# Patient Record
Sex: FEMALE | Race: WHITE | HISPANIC OR LATINO | Employment: PART TIME | URBAN - METROPOLITAN AREA
[De-identification: names, ages, dates, MRNs, and addresses within clinical notes are randomized per-mention and may not be internally consistent; named-entity substitution may affect disease eponyms.]

---

## 2021-09-05 ENCOUNTER — LAB (OUTPATIENT)
Dept: FAMILY MEDICINE | Facility: CLINIC | Age: 31
End: 2021-09-05
Attending: PHYSICIAN ASSISTANT
Payer: COMMERCIAL

## 2021-09-05 DIAGNOSIS — R07.0 THROAT PAIN: ICD-10-CM

## 2021-09-05 DIAGNOSIS — G44.209 TENSION HEADACHE: ICD-10-CM

## 2021-09-05 LAB
DEPRECATED S PYO AG THROAT QL EIA: NEGATIVE
GROUP A STREP BY PCR: NOT DETECTED

## 2021-09-05 PROCEDURE — U0003 INFECTIOUS AGENT DETECTION BY NUCLEIC ACID (DNA OR RNA); SEVERE ACUTE RESPIRATORY SYNDROME CORONAVIRUS 2 (SARS-COV-2) (CORONAVIRUS DISEASE [COVID-19]), AMPLIFIED PROBE TECHNIQUE, MAKING USE OF HIGH THROUGHPUT TECHNOLOGIES AS DESCRIBED BY CMS-2020-01-R: HCPCS

## 2021-09-05 PROCEDURE — U0005 INFEC AGEN DETEC AMPLI PROBE: HCPCS

## 2021-09-05 PROCEDURE — 87651 STREP A DNA AMP PROBE: CPT

## 2021-09-06 LAB — SARS-COV-2 RNA RESP QL NAA+PROBE: NEGATIVE

## 2021-09-25 ENCOUNTER — HEALTH MAINTENANCE LETTER (OUTPATIENT)
Age: 31
End: 2021-09-25

## 2021-12-20 ENCOUNTER — LAB REQUISITION (OUTPATIENT)
Dept: LAB | Facility: CLINIC | Age: 31
End: 2021-12-20

## 2021-12-20 LAB — HBV SURFACE AB SERPL IA-ACNC: 0.55 M[IU]/ML

## 2021-12-20 PROCEDURE — 86481 TB AG RESPONSE T-CELL SUSP: CPT | Performed by: INTERNAL MEDICINE

## 2021-12-20 PROCEDURE — 86706 HEP B SURFACE ANTIBODY: CPT | Performed by: INTERNAL MEDICINE

## 2021-12-20 PROCEDURE — 86787 VARICELLA-ZOSTER ANTIBODY: CPT | Performed by: INTERNAL MEDICINE

## 2021-12-21 LAB
GAMMA INTERFERON BACKGROUND BLD IA-ACNC: 0.06 IU/ML
M TB IFN-G BLD-IMP: NEGATIVE
M TB IFN-G CD4+ BCKGRND COR BLD-ACNC: 9.94 IU/ML
MITOGEN IGNF BCKGRD COR BLD-ACNC: 0.05 IU/ML
MITOGEN IGNF BCKGRD COR BLD-ACNC: 0.07 IU/ML
QUANTIFERON MITOGEN: 10 IU/ML
QUANTIFERON NIL TUBE: 0.06 IU/ML
QUANTIFERON TB1 TUBE: 0.13 IU/ML
QUANTIFERON TB2 TUBE: 0.11
VZV IGG SER QL IA: >4000 INDEX
VZV IGG SER QL IA: POSITIVE

## 2022-01-11 ENCOUNTER — VIRTUAL VISIT (OUTPATIENT)
Dept: FAMILY MEDICINE | Facility: CLINIC | Age: 32
End: 2022-01-11
Payer: COMMERCIAL

## 2022-01-11 DIAGNOSIS — Z31.41 FERTILITY TESTING: Primary | ICD-10-CM

## 2022-01-11 PROCEDURE — 99213 OFFICE O/P EST LOW 20 MIN: CPT | Mod: 95 | Performed by: NURSE PRACTITIONER

## 2022-01-11 NOTE — PROGRESS NOTES
Franchesca is a 31 year old who is being evaluated via a billable video visit.      How would you like to obtain your AVS? MyChart  If the video visit is dropped, the invitation should be resent by: Text to cell phone: [phone]  Will anyone else be joining your video visit? No    Video Start Time: 9:21 AM    Assessment & Plan     (Z31.41) Fertility testing  (primary encounter diagnosis)  Comment: Patient is attempted to try to get pregnant over the last year does use an ovulation calculator has not been successful concerned would like to have follow-up with OB/GYN for fertility work-up and testing  Plan: Ob/Gyn Referral       No follow-ups on file.    NEFTALI Suarez CNP  [facility]    Mario Sorensen is a 31 year old who presents for the following health issues     HPI     Patient and her  have been trying to get pregnant for a year. She would like to discuss fertility testing and treatments.        Review of Systems   Constitutional, HEENT, cardiovascular, pulmonary, gi and gu systems are negative, except as otherwise noted.      Objective           Vitals:  No vitals were obtained today due to virtual visit.    Physical Exam   GENERAL: Healthy, alert and no distress  EYES: Eyes grossly normal to inspection.  No discharge or erythema, or obvious scleral/conjunctival abnormalities.  RESP: No audible wheeze, cough, or visible cyanosis.  No visible retractions or increased work of breathing.    SKIN: Visible skin clear. No significant rash, abnormal pigmentation or lesions.  NEURO: Cranial nerves grossly intact.  Mentation and speech appropriate for age.  PSYCH: Mentation appears normal, affect normal/bright, judgement and insight intact, normal speech and appearance well-groomed.    No results found for any visits on 01/11/22.            Video-Visit Details    Type of service:  Video Visit    Video End Time:9:31     Originating Location (pt. Location): Home    Distant  Location (provider location):  [facility]     Platform used for Video Visit: Brien

## 2022-01-20 ENCOUNTER — OFFICE VISIT (OUTPATIENT)
Dept: OBGYN | Facility: CLINIC | Age: 32
End: 2022-01-20
Payer: COMMERCIAL

## 2022-01-20 VITALS
DIASTOLIC BLOOD PRESSURE: 79 MMHG | SYSTOLIC BLOOD PRESSURE: 121 MMHG | RESPIRATION RATE: 12 BRPM | HEART RATE: 84 BPM | BODY MASS INDEX: 35.95 KG/M2 | HEIGHT: 59 IN | TEMPERATURE: 98.6 F | WEIGHT: 178.3 LBS

## 2022-01-20 DIAGNOSIS — N97.9 FEMALE INFERTILITY, PRIMARY: Primary | ICD-10-CM

## 2022-01-20 DIAGNOSIS — Z12.4 CERVICAL CANCER SCREENING: ICD-10-CM

## 2022-01-20 PROCEDURE — G0145 SCR C/V CYTO,THINLAYER,RESCR: HCPCS | Performed by: OBSTETRICS & GYNECOLOGY

## 2022-01-20 PROCEDURE — 87624 HPV HI-RISK TYP POOLED RSLT: CPT | Performed by: OBSTETRICS & GYNECOLOGY

## 2022-01-20 PROCEDURE — 99243 OFF/OP CNSLTJ NEW/EST LOW 30: CPT | Performed by: OBSTETRICS & GYNECOLOGY

## 2022-01-20 PROCEDURE — 87591 N.GONORRHOEAE DNA AMP PROB: CPT | Performed by: OBSTETRICS & GYNECOLOGY

## 2022-01-20 PROCEDURE — 87491 CHLMYD TRACH DNA AMP PROBE: CPT | Performed by: OBSTETRICS & GYNECOLOGY

## 2022-01-20 ASSESSMENT — MIFFLIN-ST. JEOR: SCORE: 1434.39

## 2022-01-20 NOTE — PROGRESS NOTES
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 Patient and  have been trying to conceive since 2020. Patient reports regular, monthly cycles every 28 days. She has been taking home ovulation predictor tests which have shown positive results. Has been timing intercourse for every other day for 4 occurences around the time of ovulation. Patient has never been pregnant before and has never used any form of hormonal contraception.     OB Hx:  G0      Female infertility factors:  Ovulation   Menses: Patient's last menstrual period was 2021 (exact date)., every 28 days, lasting 5-7 days, menarche age 12-13, vaginal bleeding reported as normal, increasing dysmenorrhea over the past few years.   Ovulation predictor kits: yes, getting positive results   Denies acne, +facial hair growth, no visual field defects    Has never used any contraception    Pt denies excessive exercise, restrictive eating or a history of disordered eating.     Tubal, uterine, cervical factors   H/o STI: denies   H/o PID or ruptured appendix: denies   H/o polyp, fibroid: denies    H/o ectopic pregnancy: no   H/o abnormal pap smear: no, never been screened before   H/o endometriosis: no    General health   Pertinent PMH: Body mass index is 36.01 kg/m .   Pertinent medications: none   Pertinent surgeries: T&A, ear tubes   Smoking, history, quit 5 years ago, 3.5 pack year history   +caffeine, soda 2-3 cans/day   No alcohol   No drug use   Occupation:  at [employer]    Male infertility factors:   Name, age, birthday: Francesco Bowen LA[birth year]   PMH: none, denies history of cancer or mumps    Smoking, alcohol, drug use: none   Prior children: no   Semen analysis: never    Couple factors:   Coital activity:  "reports intercourse every other day around the time of ovulation, denies any concerns for male ejaculation     Infertility treatment to date: none    History reviewed. No pertinent past medical history.   Past Surgical History:   Procedure Laterality Date     MYRINGOTOMY, INSERT TUBE BILATERAL, COMBINED       WI REMOVE TONSILS/ADENOIDS,<13 Y/O      age 9      No current outpatient medications on file.     No current facility-administered medications for this visit.     No Known Allergies  History reviewed. No pertinent family history.  She denies any family history of birth defects, mental retardation, early menopause or infertility.  Social History     Tobacco Use     Smoking status: Former Smoker     Packs/day: 0.50     Years: 7.00     Pack years: 3.50     Smokeless tobacco: Never Used   Vaping Use     Vaping Use: Never used   Substance Use Topics     Alcohol use: Not Currently     Drug use: Never       ROS: A 10 pt ROS was completed and found to be negative unless mentioned in the HPI.     Objective:   VS: /79 (BP Location: Left arm, Patient Position: Sitting, Cuff Size: Adult Large)   Pulse 84   Temp 98.6  F (37  C) (Tympanic)   Resp 12   Ht 1.499 m (4' 11\")   Wt 80.9 kg (178 lb 4.8 oz)   LMP 12/26/2021 (Exact Date)   BMI 36.01 kg/m    General appearance: well-hydrated, A&O x 3, no apparent distress  Lungs: Equal expansion bilaterally, no accessory muscle use  Heart: No heaves or thrills. No peripheral varicosities  Constitutional: See vitals  Abdomen: Soft, non-tender, non-distended. No rebound, rigidity, or guarding.  Extremities: no edema  Neuro: CN II-XII grossly intact  Genitourinary:  External genitalia: no erythema, no lesions.   Urethral meatus appropriate location without lesions or prolapse  Urethra: No masses, tenderness, or scarring  Bladder no fullness, masses, or tenderness.  Anus and Perineum: Unremarkable, no visible lesions  Vagina: Normal, healthy pink mucosa without any lesions. " Physiologic vaginal discharge.   Cervix: normal appearance, no cervical motion tenderness.   Uterus: normal size, shape and consistency.   Adnexa: no masses or tenderness bilaterally.      Labs and Imaging: None    Assessment/Plan:   My impression is that this is a 31 year old G0 with primary infertility.   Counseled the patient on the basic steps to conception and reproductive anatomy, normal rates of conception during one year (~85%) and suspected pathology for her infertility, unknown at this time. Discussed menstrual tracking, use of ovulation predictor kits and timed intercourse (every other day starting the day of +OPK). Recommended a menstrual calendar or phone hollie.     Plan for work-up with day #3 labs including estradiol, LH, FSH, TSH, prolactin, and AMH. Will evaluate for PCOS, plan to obtain free and total testosterone, DHEAS, 17-OHP, HgbA1. Plan to obtain pelvic US today to assess for uterine/ovarian/tubal pathology, then HSG (pt to call clinic on Day#1 of next menses) to assess for tubal patency. Pt is not at risk for PID with HSG, so will not pre-treat with doxycycline.     Will evaluate for possible male factor infertility with semen analysis, ordered today. Desired semen anaylsis parameters include (volume >1.5mL, concentration >15 million/mL, motility >40%, morphology >4% normal forms).     Preconception counseling: counseled patient to continue daily PNV, limit caffeine intake (<250mg), no EtOH or drug use during pregnancy. I reviewed her medications and found no potential teratogens. Discussed patients BMI, Body mass index is 36.01 kg/m ., and recommended dietary and activity modifications. Discussed how obesity can negatively impact ovulation and fertility. Pt is a non-smoker.       Plan to return to clinic in 4- weeks after completion with infertility workup.       Avani Beatty DO

## 2022-01-20 NOTE — NURSING NOTE
"Initial /79 (BP Location: Left arm, Patient Position: Sitting, Cuff Size: Adult Large)   Pulse 84   Temp 98.6  F (37  C) (Tympanic)   Resp 12   Ht 1.499 m (4' 11\")   Wt 80.9 kg (178 lb 4.8 oz)   LMP 12/26/2021 (Exact Date)   BMI 36.01 kg/m   Estimated body mass index is 36.01 kg/m  as calculated from the following:    Height as of this encounter: 1.499 m (4' 11\").    Weight as of this encounter: 80.9 kg (178 lb 4.8 oz). .      "

## 2022-01-21 LAB
C TRACH DNA SPEC QL PROBE+SIG AMP: NEGATIVE
N GONORRHOEA DNA SPEC QL NAA+PROBE: NEGATIVE

## 2022-01-24 LAB
BKR LAB AP GYN ADEQUACY: NORMAL
BKR LAB AP GYN INTERPRETATION: NORMAL
BKR LAB AP HPV REFLEX: NORMAL
BKR LAB AP PREVIOUS ABNORMAL: NORMAL
PATH REPORT.COMMENTS IMP SPEC: NORMAL
PATH REPORT.COMMENTS IMP SPEC: NORMAL
PATH REPORT.RELEVANT HX SPEC: NORMAL

## 2022-01-26 LAB
HUMAN PAPILLOMA VIRUS 16 DNA: NEGATIVE
HUMAN PAPILLOMA VIRUS 18 DNA: NEGATIVE
HUMAN PAPILLOMA VIRUS FINAL DIAGNOSIS: NORMAL
HUMAN PAPILLOMA VIRUS OTHER HR: NEGATIVE

## 2022-01-27 ENCOUNTER — LAB (OUTPATIENT)
Dept: LAB | Facility: CLINIC | Age: 32
End: 2022-01-27
Payer: COMMERCIAL

## 2022-01-27 DIAGNOSIS — N97.9 FEMALE INFERTILITY, PRIMARY: ICD-10-CM

## 2022-01-27 LAB
ESTRADIOL SERPL-MCNC: 56 PG/ML
FSH SERPL-ACNC: 5.6 IU/L
HBA1C MFR BLD: 5.5 % (ref 0–5.6)
LH SERPL-ACNC: 2.3 IU/L
PROLACTIN SERPL-MCNC: 18 UG/L (ref 3–27)
TSH SERPL DL<=0.005 MIU/L-ACNC: 1.66 MU/L (ref 0.4–4)

## 2022-01-27 PROCEDURE — 83001 ASSAY OF GONADOTROPIN (FSH): CPT

## 2022-01-27 PROCEDURE — 83036 HEMOGLOBIN GLYCOSYLATED A1C: CPT

## 2022-01-27 PROCEDURE — 82670 ASSAY OF TOTAL ESTRADIOL: CPT

## 2022-01-27 PROCEDURE — 83002 ASSAY OF GONADOTROPIN (LH): CPT

## 2022-01-27 PROCEDURE — 83520 IMMUNOASSAY QUANT NOS NONAB: CPT | Mod: 90

## 2022-01-27 PROCEDURE — 36415 COLL VENOUS BLD VENIPUNCTURE: CPT

## 2022-01-27 PROCEDURE — 84443 ASSAY THYROID STIM HORMONE: CPT

## 2022-01-27 PROCEDURE — 82627 DEHYDROEPIANDROSTERONE: CPT

## 2022-01-27 PROCEDURE — 84403 ASSAY OF TOTAL TESTOSTERONE: CPT

## 2022-01-27 PROCEDURE — 84146 ASSAY OF PROLACTIN: CPT

## 2022-01-27 PROCEDURE — 84270 ASSAY OF SEX HORMONE GLOBUL: CPT

## 2022-01-27 PROCEDURE — 87389 HIV-1 AG W/HIV-1&-2 AB AG IA: CPT

## 2022-01-28 LAB
DHEA-S SERPL-MCNC: 431 UG/DL (ref 35–430)
HIV 1+2 AB+HIV1 P24 AG SERPL QL IA: NONREACTIVE
SHBG SERPL-SCNC: 25 NMOL/L (ref 30–135)

## 2022-01-29 LAB — MIS SERPL-MCNC: 5.76 NG/ML

## 2022-02-01 LAB
TESTOST FREE SERPL-MCNC: 0.67 NG/DL
TESTOST SERPL-MCNC: 32 NG/DL (ref 8–60)

## 2022-02-02 ENCOUNTER — HOSPITAL ENCOUNTER (OUTPATIENT)
Dept: GENERAL RADIOLOGY | Facility: CLINIC | Age: 32
End: 2022-02-02
Attending: OBSTETRICS & GYNECOLOGY
Payer: COMMERCIAL

## 2022-02-02 ENCOUNTER — OFFICE VISIT (OUTPATIENT)
Dept: OBGYN | Facility: CLINIC | Age: 32
End: 2022-02-02
Payer: COMMERCIAL

## 2022-02-02 ENCOUNTER — HOSPITAL ENCOUNTER (OUTPATIENT)
Dept: ULTRASOUND IMAGING | Facility: CLINIC | Age: 32
End: 2022-02-02
Attending: OBSTETRICS & GYNECOLOGY
Payer: COMMERCIAL

## 2022-02-02 DIAGNOSIS — N97.9 PRIMARY FEMALE INFERTILITY: Primary | ICD-10-CM

## 2022-02-02 DIAGNOSIS — N97.9 FEMALE INFERTILITY, PRIMARY: ICD-10-CM

## 2022-02-02 PROCEDURE — 58340 CATHETER FOR HYSTEROGRAPHY: CPT | Performed by: OBSTETRICS & GYNECOLOGY

## 2022-02-02 PROCEDURE — 74740 X-RAY FEMALE GENITAL TRACT: CPT

## 2022-02-02 PROCEDURE — 272N000058 XR HYSTEROSALPINGOGRAM

## 2022-02-02 PROCEDURE — 250N000011 HC RX IP 250 OP 636: Performed by: OBSTETRICS & GYNECOLOGY

## 2022-02-02 PROCEDURE — 76830 TRANSVAGINAL US NON-OB: CPT

## 2022-02-02 RX ORDER — IOPAMIDOL 612 MG/ML
15 INJECTION, SOLUTION INTRATHECAL ONCE
Status: COMPLETED | OUTPATIENT
Start: 2022-02-02 | End: 2022-02-02

## 2022-02-02 RX ADMIN — IOPAMIDOL 10 ML: 612 INJECTION, SOLUTION INTRATHECAL at 11:19

## 2022-02-02 NOTE — PROGRESS NOTES
[facility]     HSG Procedure Note     Franchesca Brewer  1990  [id number]     Procedure: Hysterosalpingogram     Indication: Infertility evaluation      Procedure: I reviewed the risks of the procedure including bleeding, cramping/pain and infection. Written informed consent was signed. The patient was placed in dorsal lithotomy and a speculum was used to visualize the cervix. The cervix was cleaned with betadine swabs x3. Tenaculum was placed on the cervix. The HSG catheter was passed through the cervix and the catheter balloon was inflated within the uterus. Dye was injected through the catheter while real-time x-rays were obtained by the Radiologist. Please see their documentation for interpretation of these images. Once adequate images were obtained, all instruments and catheter were removed. The patient tolerated the procedure well and EBL 0cc.    Avani Beatty DO       ear L/ear R detailed exam details…

## 2022-02-04 DIAGNOSIS — N97.9 FEMALE INFERTILITY, PRIMARY: Primary | ICD-10-CM

## 2022-02-07 ENCOUNTER — LAB (OUTPATIENT)
Dept: LAB | Facility: CLINIC | Age: 32
End: 2022-02-07
Payer: COMMERCIAL

## 2022-02-07 DIAGNOSIS — N97.9 FEMALE INFERTILITY, PRIMARY: ICD-10-CM

## 2022-02-07 PROCEDURE — 36415 COLL VENOUS BLD VENIPUNCTURE: CPT

## 2022-02-10 LAB — 17OH-PREG SERPL-MCNC: 811 NG/DL

## 2022-02-11 ENCOUNTER — TELEPHONE (OUTPATIENT)
Dept: OBGYN | Facility: CLINIC | Age: 32
End: 2022-02-11
Payer: COMMERCIAL

## 2022-02-11 DIAGNOSIS — N97.9 FEMALE INFERTILITY, PRIMARY: Primary | ICD-10-CM

## 2022-02-11 NOTE — TELEPHONE ENCOUNTER
Telephone call to patient re: endocrinology referral. Order placed.    Josephine Arango RN on 2/11/2022 at 4:30 PM

## 2022-02-11 NOTE — PROGRESS NOTES
Patient set up with Endocrine referral and Endo will contact her to schedule appointment.    Discussed with patient.    Josephine Arango RN on 2/11/2022 at 4:31 PM

## 2022-02-15 ENCOUNTER — HOSPITAL ENCOUNTER (EMERGENCY)
Facility: CLINIC | Age: 32
Discharge: HOME | End: 2022-02-15
Attending: FAMILY MEDICINE | Admitting: FAMILY MEDICINE
Payer: COMMERCIAL

## 2022-02-15 ENCOUNTER — APPOINTMENT (OUTPATIENT)
Dept: CT IMAGING | Facility: CLINIC | Age: 32
End: 2022-02-15
Attending: FAMILY MEDICINE
Payer: COMMERCIAL

## 2022-02-15 VITALS
SYSTOLIC BLOOD PRESSURE: 114 MMHG | RESPIRATION RATE: 18 BRPM | OXYGEN SATURATION: 98 % | HEART RATE: 62 BPM | TEMPERATURE: 97.2 F | BODY MASS INDEX: 35.95 KG/M2 | DIASTOLIC BLOOD PRESSURE: 76 MMHG | WEIGHT: 178 LBS

## 2022-02-15 DIAGNOSIS — N20.1 URETEROLITHIASIS: ICD-10-CM

## 2022-02-15 LAB
ALBUMIN SERPL-MCNC: 3.8 G/DL (ref 3.4–5)
ALP SERPL-CCNC: 51 U/L (ref 40–150)
ALT SERPL W P-5'-P-CCNC: 36 U/L (ref 0–50)
ANION GAP SERPL CALCULATED.3IONS-SCNC: 8 MMOL/L (ref 3–14)
AST SERPL W P-5'-P-CCNC: 24 U/L (ref 0–45)
BASOPHILS # BLD AUTO: 0.1 10E3/UL (ref 0–0.2)
BASOPHILS NFR BLD AUTO: 1 %
BILIRUB SERPL-MCNC: 0.2 MG/DL (ref 0.2–1.3)
BUN SERPL-MCNC: 14 MG/DL (ref 7–30)
CALCIUM SERPL-MCNC: 8.7 MG/DL (ref 8.5–10.1)
CHLORIDE BLD-SCNC: 108 MMOL/L (ref 94–109)
CO2 SERPL-SCNC: 23 MMOL/L (ref 20–32)
CREAT SERPL-MCNC: 0.8 MG/DL (ref 0.52–1.04)
CRP SERPL-MCNC: 5.3 MG/L (ref 0–8)
EOSINOPHIL # BLD AUTO: 0.2 10E3/UL (ref 0–0.7)
EOSINOPHIL NFR BLD AUTO: 2 %
ERYTHROCYTE [DISTWIDTH] IN BLOOD BY AUTOMATED COUNT: 12.4 % (ref 10–15)
GFR SERPL CREATININE-BSD FRML MDRD: >90 ML/MIN/1.73M2
GLUCOSE BLD-MCNC: 115 MG/DL (ref 70–99)
HCG SERPL QL: NEGATIVE
HCT VFR BLD AUTO: 38.5 % (ref 35–47)
HGB BLD-MCNC: 13.5 G/DL (ref 11.7–15.7)
IMM GRANULOCYTES # BLD: 0.1 10E3/UL
IMM GRANULOCYTES NFR BLD: 1 %
LACTATE SERPL-SCNC: 2.9 MMOL/L (ref 0.7–2)
LIPASE SERPL-CCNC: 227 U/L (ref 73–393)
LYMPHOCYTES # BLD AUTO: 3.3 10E3/UL (ref 0.8–5.3)
LYMPHOCYTES NFR BLD AUTO: 25 %
MCH RBC QN AUTO: 32.1 PG (ref 26.5–33)
MCHC RBC AUTO-ENTMCNC: 35.1 G/DL (ref 31.5–36.5)
MCV RBC AUTO: 91 FL (ref 78–100)
MONOCYTES # BLD AUTO: 0.9 10E3/UL (ref 0–1.3)
MONOCYTES NFR BLD AUTO: 7 %
NEUTROPHILS # BLD AUTO: 8.5 10E3/UL (ref 1.6–8.3)
NEUTROPHILS NFR BLD AUTO: 64 %
NRBC # BLD AUTO: 0 10E3/UL
NRBC BLD AUTO-RTO: 0 /100
PLATELET # BLD AUTO: 271 10E3/UL (ref 150–450)
POTASSIUM BLD-SCNC: 3 MMOL/L (ref 3.4–5.3)
PROT SERPL-MCNC: 7.2 G/DL (ref 6.8–8.8)
RBC # BLD AUTO: 4.21 10E6/UL (ref 3.8–5.2)
SODIUM SERPL-SCNC: 139 MMOL/L (ref 133–144)
WBC # BLD AUTO: 13 10E3/UL (ref 4–11)

## 2022-02-15 PROCEDURE — 99284 EMERGENCY DEPT VISIT MOD MDM: CPT | Performed by: FAMILY MEDICINE

## 2022-02-15 PROCEDURE — 96361 HYDRATE IV INFUSION ADD-ON: CPT | Performed by: FAMILY MEDICINE

## 2022-02-15 PROCEDURE — 84703 CHORIONIC GONADOTROPIN ASSAY: CPT | Performed by: FAMILY MEDICINE

## 2022-02-15 PROCEDURE — 86140 C-REACTIVE PROTEIN: CPT | Performed by: FAMILY MEDICINE

## 2022-02-15 PROCEDURE — 96374 THER/PROPH/DIAG INJ IV PUSH: CPT | Mod: 59 | Performed by: FAMILY MEDICINE

## 2022-02-15 PROCEDURE — 36415 COLL VENOUS BLD VENIPUNCTURE: CPT | Performed by: FAMILY MEDICINE

## 2022-02-15 PROCEDURE — 258N000003 HC RX IP 258 OP 636: Performed by: FAMILY MEDICINE

## 2022-02-15 PROCEDURE — 80053 COMPREHEN METABOLIC PANEL: CPT | Performed by: FAMILY MEDICINE

## 2022-02-15 PROCEDURE — 250N000011 HC RX IP 250 OP 636: Performed by: FAMILY MEDICINE

## 2022-02-15 PROCEDURE — 99284 EMERGENCY DEPT VISIT MOD MDM: CPT | Mod: 25 | Performed by: FAMILY MEDICINE

## 2022-02-15 PROCEDURE — 74176 CT ABD & PELVIS W/O CONTRAST: CPT

## 2022-02-15 PROCEDURE — 85025 COMPLETE CBC W/AUTO DIFF WBC: CPT | Performed by: FAMILY MEDICINE

## 2022-02-15 PROCEDURE — 96375 TX/PRO/DX INJ NEW DRUG ADDON: CPT | Performed by: FAMILY MEDICINE

## 2022-02-15 PROCEDURE — 83690 ASSAY OF LIPASE: CPT | Performed by: FAMILY MEDICINE

## 2022-02-15 PROCEDURE — 83605 ASSAY OF LACTIC ACID: CPT | Performed by: FAMILY MEDICINE

## 2022-02-15 RX ORDER — ONDANSETRON 2 MG/ML
4 INJECTION INTRAMUSCULAR; INTRAVENOUS ONCE
Status: COMPLETED | OUTPATIENT
Start: 2022-02-15 | End: 2022-02-15

## 2022-02-15 RX ORDER — KETOROLAC TROMETHAMINE 15 MG/ML
10 INJECTION, SOLUTION INTRAMUSCULAR; INTRAVENOUS ONCE
Status: COMPLETED | OUTPATIENT
Start: 2022-02-15 | End: 2022-02-15

## 2022-02-15 RX ORDER — ONDANSETRON 4 MG/1
4 TABLET, ORALLY DISINTEGRATING ORAL EVERY 8 HOURS PRN
Qty: 20 TABLET | Refills: 0 | Status: SHIPPED | OUTPATIENT
Start: 2022-02-15 | End: 2022-02-18

## 2022-02-15 RX ORDER — SODIUM CHLORIDE 9 MG/ML
INJECTION, SOLUTION INTRAVENOUS CONTINUOUS
Status: DISCONTINUED | OUTPATIENT
Start: 2022-02-15 | End: 2022-02-15 | Stop reason: HOSPADM

## 2022-02-15 RX ORDER — OXYCODONE HYDROCHLORIDE 5 MG/1
5 TABLET ORAL EVERY 6 HOURS PRN
Qty: 12 TABLET | Refills: 0 | Status: SHIPPED | OUTPATIENT
Start: 2022-02-15 | End: 2022-02-18

## 2022-02-15 RX ORDER — HYDROMORPHONE HYDROCHLORIDE 1 MG/ML
0.5 INJECTION, SOLUTION INTRAMUSCULAR; INTRAVENOUS; SUBCUTANEOUS
Status: DISCONTINUED | OUTPATIENT
Start: 2022-02-15 | End: 2022-02-15 | Stop reason: HOSPADM

## 2022-02-15 RX ORDER — PRENATAL VIT/IRON FUM/FOLIC AC 27MG-0.8MG
1 TABLET ORAL DAILY
COMMUNITY
End: 2024-06-11

## 2022-02-15 RX ADMIN — KETOROLAC TROMETHAMINE 10 MG: 15 INJECTION, SOLUTION INTRAMUSCULAR; INTRAVENOUS at 12:26

## 2022-02-15 RX ADMIN — SODIUM CHLORIDE 1000 ML: 9 INJECTION, SOLUTION INTRAVENOUS at 12:25

## 2022-02-15 RX ADMIN — ONDANSETRON 4 MG: 2 INJECTION INTRAMUSCULAR; INTRAVENOUS at 12:25

## 2022-02-15 NOTE — ED PROVIDER NOTES
History     Chief Complaint   Patient presents with     Flank Pain     HPI  Franchesca Brewer is a 31 year old female, past medical history is significant for urolithiasis, presents to the emergency department with concerns of sudden onset right flank pain awakening her from sleep early this morning, history of nausea but no vomiting.  Worse with movement, no urinary frequency, urgency or dysuria.  No medications tried at home.  Denies fever chills or sweats.  No recent history for trauma or injury.  The patient states that she has had kidney stones in the past and this feels very consistent with that experience.  No prior abdominal surgeries.      Allergies:  No Known Allergies    Problem List:    There are no problems to display for this patient.       Past Medical History:    No past medical history on file.    Past Surgical History:    Past Surgical History:   Procedure Laterality Date     MYRINGOTOMY, INSERT TUBE BILATERAL, COMBINED       ND REMOVE TONSILS/ADENOIDS,<13 Y/O      age 9       Family History:    No family history on file.    Social History:  Marital Status:   [2]  Social History     Tobacco Use     Smoking status: Former Smoker     Packs/day: 0.50     Years: 7.00     Pack years: 3.50     Smokeless tobacco: Never Used   Vaping Use     Vaping Use: Never used   Substance Use Topics     Alcohol use: Not Currently     Drug use: Never        Medications:    No current outpatient medications on file.        Review of Systems   All other systems reviewed and are negative.      Physical Exam   BP: (!) 125/90  Pulse: 72  Temp: 97.2  F (36.2  C)  Resp: 18  Weight: 80.7 kg (178 lb)  SpO2: 100 %      Physical Exam  Vitals and nursing note reviewed.   Constitutional:       General: She is in acute distress.      Appearance: Normal appearance. She is obese. She is not ill-appearing.      Comments: Alert, anxious, clearly uncomfortable due to pain and nausea   HENT:      Head: Normocephalic and  atraumatic.      Right Ear: Tympanic membrane normal.      Left Ear: Tympanic membrane normal.      Mouth/Throat:      Mouth: Mucous membranes are dry.      Pharynx: Oropharynx is clear.   Eyes:      Extraocular Movements: Extraocular movements intact.      Conjunctiva/sclera: Conjunctivae normal.      Pupils: Pupils are equal, round, and reactive to light.   Cardiovascular:      Rate and Rhythm: Normal rate and regular rhythm.      Pulses: Normal pulses.      Heart sounds: Normal heart sounds.   Pulmonary:      Effort: Pulmonary effort is normal.      Breath sounds: Normal breath sounds.   Abdominal:      General: Bowel sounds are normal.      Palpations: Abdomen is soft.   Musculoskeletal:         General: Normal range of motion.      Cervical back: Normal range of motion and neck supple.   Skin:     General: Skin is warm and dry.      Capillary Refill: Capillary refill takes less than 2 seconds.   Neurological:      General: No focal deficit present.      Mental Status: She is alert and oriented to person, place, and time.   Psychiatric:         Mood and Affect: Mood normal.         Behavior: Behavior normal.         ED Course                 Procedures              Critical Care time:  none               Results for orders placed or performed during the hospital encounter of 02/15/22 (from the past 24 hour(s))   CBC with platelets differential    Narrative    The following orders were created for panel order CBC with platelets differential.  Procedure                               Abnormality         Status                     ---------                               -----------         ------                     CBC with platelets and d...[[id number]]  Abnormal            Final result                 Please view results for these tests on the individual orders.   CRP inflammation   Result Value Ref Range    CRP Inflammation 5.3 0.0 - 8.0 mg/L   Comprehensive metabolic panel   Result Value Ref Range    Sodium 139  133 - 144 mmol/L    Potassium 3.0 (L) 3.4 - 5.3 mmol/L    Chloride 108 94 - 109 mmol/L    Carbon Dioxide (CO2) 23 20 - 32 mmol/L    Anion Gap 8 3 - 14 mmol/L    Urea Nitrogen 14 7 - 30 mg/dL    Creatinine 0.80 0.52 - 1.04 mg/dL    Calcium 8.7 8.5 - 10.1 mg/dL    Glucose 115 (H) 70 - 99 mg/dL    Alkaline Phosphatase 51 40 - 150 U/L    AST 24 0 - 45 U/L    ALT 36 0 - 50 U/L    Protein Total 7.2 6.8 - 8.8 g/dL    Albumin 3.8 3.4 - 5.0 g/dL    Bilirubin Total 0.2 0.2 - 1.3 mg/dL    GFR Estimate >90 >60 mL/min/1.73m2   Lipase   Result Value Ref Range    Lipase 227 73 - 393 U/L   Lactic acid whole blood   Result Value Ref Range    Lactic Acid 2.9 (H) 0.7 - 2.0 mmol/L   HCG qualitative pregnancy (blood)   Result Value Ref Range    hCG Serum Qualitative Negative Negative   CBC with platelets and differential   Result Value Ref Range    WBC Count 13.0 (H) 4.0 - 11.0 10e3/uL    RBC Count 4.21 3.80 - 5.20 10e6/uL    Hemoglobin 13.5 11.7 - 15.7 g/dL    Hematocrit 38.5 35.0 - 47.0 %    MCV 91 78 - 100 fL    MCH 32.1 26.5 - 33.0 pg    MCHC 35.1 31.5 - 36.5 g/dL    RDW 12.4 10.0 - 15.0 %    Platelet Count 271 150 - 450 10e3/uL    % Neutrophils 64 %    % Lymphocytes 25 %    % Monocytes 7 %    % Eosinophils 2 %    % Basophils 1 %    % Immature Granulocytes 1 %    NRBCs per 100 WBC 0 <1 /100    Absolute Neutrophils 8.5 (H) 1.6 - 8.3 10e3/uL    Absolute Lymphocytes 3.3 0.8 - 5.3 10e3/uL    Absolute Monocytes 0.9 0.0 - 1.3 10e3/uL    Absolute Eosinophils 0.2 0.0 - 0.7 10e3/uL    Absolute Basophils 0.1 0.0 - 0.2 10e3/uL    Absolute Immature Granulocytes 0.1 <=0.4 10e3/uL    Absolute NRBCs 0.0 10e3/uL   CT Abdomen Pelvis without Contrast (stone protocol)    Narrative    CT ABDOMEN/PELVIS WITHOUT CONTRAST February 15, 2022 1:16 PM    CLINICAL HISTORY: Flank pain, kidney stone suspected.    TECHNIQUE: CT scan of the abdomen and pelvis was performed without IV  contrast. Multiplanar reformats were obtained. Dose reduction  techniques were  used.  CONTRAST: None.    COMPARISON: 1/23/2012.    FINDINGS:   LOWER CHEST: Normal.    HEPATOBILIARY: Normal.    PANCREAS: Normal.    SPLEEN: Normal.    ADRENAL GLANDS: Normal.    KIDNEYS/BLADDER: There are four calcifications in the right renal  collecting system measuring up to 4 mm. There is mild right  hydronephrosis and hydroureter. There is a 3 mm calculus at the right  ureterovesical junction (series 5, image 188). No urinary tract  calculi or hydronephrosis on the left.    BOWEL: Normal appendix. No bowel obstruction or inflammatory change.    LYMPH NODES: Normal.    VASCULATURE: Unremarkable.    PELVIC ORGANS: Normal.    OTHER: No ascites.    MUSCULOSKELETAL: Unremarkable.      Impression    IMPRESSION: Mild right hydronephrosis and hydroureter above a 3 mm  calculus at the right ureterovesical junction. There are four  additional calcifications in the right renal collecting system.    MONALISA CUTLER MD         SYSTEM ID:  [id number]       Medications   sodium chloride 0.9% infusion (has no administration in time range)   HYDROmorphone (PF) (DILAUDID) injection 0.5 mg (has no administration in time range)   0.9% sodium chloride BOLUS (0 mLs Intravenous Stopped 2/15/22 1323)   ketorolac (TORADOL) injection 10 mg (10 mg Intravenous Given 2/15/22 1226)   ondansetron (ZOFRAN) injection 4 mg (4 mg Intravenous Given 2/15/22 1225)     1:57 PM  Patient comfortable.  Reviewed lab diagnostics and imaging.  We will plan for expectant stone management and reviewed treatment plan.  Contact information for urology given should the patient require further follow-up.  She may return to the emergency department also if unsuccessful with expectant management.  Prescriptions for oxycodone for breakthrough pain that does not respond to ibuprofen 400 mg p.o. 3 times daily with food.  Zofran as needed for nausea/vomiting.    Assessments & Plan (with Medical Decision Making)   31-year-old female past medical history reviewed as  above who presents for evaluation of abdominal and flank pain as discussed in the HPI.  Consistent with previous experience per the patient of ureterolithiasis.  Alert and clearly uncomfortable on exam but with stable adult range normal vital signs.  Diagnostics and imaging reviewed above consistent with a 3 mm distal right ureteral stone near the UVJ.  Expectant management discussed with the patient return criteria reviewed.  All questions were answered disposition is to home.    Disclaimer: This note consists of symbols derived from keyboarding, dictation and/or voice recognition software. As a result, there may be errors in the script that have gone undetected. Please consider this when interpreting information found in this chart.      I have reviewed the nursing notes.    I have reviewed the findings, diagnosis, plan and need for follow up with the patient.       New Prescriptions    No medications on file       Final diagnoses:   Ureterolithiasis       2/15/2022   [facility] EMERGENCY DEPT     Dave Fischer MD  02/15/22 [id number]

## 2022-02-15 NOTE — ED NOTES
Onset of back pain this morning, more in right flank, history of small kidney stones but nothing like this, worse with movement, no urinary sx, nothing tried for pain at home

## 2022-02-15 NOTE — DISCHARGE INSTRUCTIONS
Push fluids, rest.  Ibuprofen 400 mg p.o. 3 times daily with food.  Oxycodone for breakthrough pain.  Zofran as needed for nausea/vomiting.  As long as you are comfortable and can tolerate this at home you can wait up to 2 weeks for stone passage.  If things worsen or you not able to tolerate expectant management because of pain nausea or any other reason please return to the emergency department or contact urology at the contact information given.

## 2022-03-04 NOTE — TELEPHONE ENCOUNTER
RECORDS RECEIVED FROM: internal    DATE RECEIVED: 5.11.22    NOTES (FOR ALL VISITS) STATUS DETAILS   OFFICE NOTES from referring provider internal  Stefany Rivers MD   OFFICE NOTES from other specialist     ED NOTES     OPERATIVE REPORT  (thyroid, pituitary, adrenal, parathyroid)     MEDICATION LIST internal     IMAGING      DEXASCAN     MRI (BRAIN)     XR (Chest)     CT (HEAD/NECK/CHEST/ABDOMEN) internal  2.15.22    NUCLEAR      ULTRASOUND  internal  2.2.22    LABS     DIABETES: HBGA1C, CREATININE, FASTING LIPIDS, MICROALBUMIN URINE, POTASSIUM, TSH, T4    THYROID: TSH, T4, CBC, THYRODLONULIN, TOTAL T3, FREE T4, CALCITONIN, CEA internal  internal

## 2022-05-11 ENCOUNTER — PRE VISIT (OUTPATIENT)
Dept: ENDOCRINOLOGY | Facility: CLINIC | Age: 32
End: 2022-05-11

## 2022-05-11 ENCOUNTER — OFFICE VISIT (OUTPATIENT)
Dept: ENDOCRINOLOGY | Facility: CLINIC | Age: 32
End: 2022-05-11
Payer: COMMERCIAL

## 2022-05-11 VITALS
HEART RATE: 86 BPM | BODY MASS INDEX: 36.12 KG/M2 | WEIGHT: 179.2 LBS | SYSTOLIC BLOOD PRESSURE: 150 MMHG | DIASTOLIC BLOOD PRESSURE: 95 MMHG | HEIGHT: 59 IN

## 2022-05-11 DIAGNOSIS — R79.89 HIGH SERUM 17-HYDROXYPROGESTERONE: ICD-10-CM

## 2022-05-11 DIAGNOSIS — N97.9 FEMALE INFERTILITY: Primary | ICD-10-CM

## 2022-05-11 PROCEDURE — 99205 OFFICE O/P NEW HI 60 MIN: CPT | Performed by: STUDENT IN AN ORGANIZED HEALTH CARE EDUCATION/TRAINING PROGRAM

## 2022-05-11 RX ORDER — METHYLPREDNISOLONE SODIUM SUCCINATE 125 MG/2ML
125 INJECTION, POWDER, LYOPHILIZED, FOR SOLUTION INTRAMUSCULAR; INTRAVENOUS
Status: CANCELLED
Start: 2022-05-12

## 2022-05-11 RX ORDER — MEPERIDINE HYDROCHLORIDE 25 MG/ML
25 INJECTION INTRAMUSCULAR; INTRAVENOUS; SUBCUTANEOUS EVERY 30 MIN PRN
Status: CANCELLED | OUTPATIENT
Start: 2022-05-12

## 2022-05-11 RX ORDER — EPINEPHRINE 1 MG/ML
0.3 INJECTION, SOLUTION, CONCENTRATE INTRAVENOUS EVERY 5 MIN PRN
Status: CANCELLED | OUTPATIENT
Start: 2022-05-12

## 2022-05-11 RX ORDER — COSYNTROPIN 0.25 MG/ML
250 INJECTION, POWDER, FOR SOLUTION INTRAMUSCULAR; INTRAVENOUS ONCE
Status: CANCELLED
Start: 2022-05-12 | End: 2022-05-12

## 2022-05-11 RX ORDER — DIPHENHYDRAMINE HYDROCHLORIDE 50 MG/ML
50 INJECTION INTRAMUSCULAR; INTRAVENOUS
Status: CANCELLED
Start: 2022-05-12

## 2022-05-11 RX ORDER — NALOXONE HYDROCHLORIDE 0.4 MG/ML
0.2 INJECTION, SOLUTION INTRAMUSCULAR; INTRAVENOUS; SUBCUTANEOUS
Status: CANCELLED | OUTPATIENT
Start: 2022-05-12

## 2022-05-11 RX ORDER — ALBUTEROL SULFATE 0.83 MG/ML
2.5 SOLUTION RESPIRATORY (INHALATION)
Status: CANCELLED | OUTPATIENT
Start: 2022-05-12

## 2022-05-11 RX ORDER — ALBUTEROL SULFATE 90 UG/1
1-2 AEROSOL, METERED RESPIRATORY (INHALATION)
Status: CANCELLED
Start: 2022-05-12

## 2022-05-11 ASSESSMENT — PAIN SCALES - GENERAL: PAINLEVEL: NO PAIN (0)

## 2022-05-11 NOTE — LETTER
5/11/2022       RE: Franchesca Brewer  [address] [facility] [address]     Dear Colleague,    Thank you for referring your patient, Franchesca Brewer, to the [facility] ENDOCRINOLOG[facility] [place] [facility]. Please see a copy of my visit note below.    Endocrinology Clinic Visit 5/11/2022    NAME:  Franchesca Brewer  PCP:  [place], Barrie White Post  MRN:  [id number]  Reason for Consult:  elevated 17 hydroxyprogesterone and infertility  Requesting Provider:  Stefany Rivers    Chief Complaint     Chief Complaint   Patient presents with     Consult     Female infertility       History of Present Illness     Franchesca Brewer is a 31 year old female who is seen in clinic for elevated 17 hydroxyprogesterone and infertility    Patient and  have been trying to conceive since November 2020. She has no previous pregnancies,  has no previous biologic kids. She established with Dr. Beatty OB/GYN for fertility work up.  She reported regular periods since puberty every 28 days , last 5-7 days. She has been taking home ovulation tests which have shown positive results. Never been on OCP. She denied any acne. She did report mild hirsutism ( upper lip, under chin , and belly bottom), she thought it is genetic because her mother is  in origin. Since marriage 11/2020 she gained 20 lbs which she related to mood changes due to stress and infertility. She did gain 10 lbs in the past few month despite activity and diet changes and noted one small purple skin stretch janeth on her lower belly. She said she fairly bruises easily in the past couple years. No big changes in energy level , no weakness. BP today was up to 150/95 but usually 110-120 systolic. ROS otherwise negative.    She is on prenatal vitamins, no other meds, no other OTC, never been on steroids before.    She had hormonal work up for fertility which was significant  for elevated 17 hydroxyprogesterone at 811 done on 2/17/2020 2 in the afternoon at 1406.  Patient reported test was done 8 days after her menstrual period, it was done right after she w woke up. she usually sleeps all morning and wakes up in the afternoon (work hours are 3 PM till midnight).  Other significant labs are normal TSH, prolactin, testosterone, FSH, LH, estradiol.  DHEA S in the upper limit of normal at 431.    Imaging: Normal pelvic ultrasound, unremarkable hysterosalpingogram.    Of note,  did not do an analysis yet, appointment scheduled in June.    Social: she works as  at [employer], she does not smoke ( she stopped 5 years ago), no alcohol, no illicit drugs ( occasional weed).    Family hx: father with crohns, aunts with breast cancer, aunt with dementia      Problem List     Patient Active Problem List   Diagnosis     Female infertility        Medications     Current Outpatient Medications   Medication     Prenatal Vit-Fe Fumarate-FA (PRENATAL MULTIVITAMIN W/IRON) 27-0.8 MG tablet     No current facility-administered medications for this visit.        Allergies     No Known Allergies    Medical / Surgical History     No past medical history on file.  Past Surgical History:   Procedure Laterality Date     MYRINGOTOMY, INSERT TUBE BILATERAL, COMBINED       [place] REMOVE TONSILS/ADENOIDS,<13 Y/O      age 9       Social History     Social History     Socioeconomic History     Marital status:      Spouse name: Not on file     Number of children: Not on file     Years of education: Not on file     Highest education level: Not on file   Occupational History     Not on file   Tobacco Use     Smoking status: Former Smoker     Packs/day: 0.50     Years: 7.00     Pack years: 3.50     Smokeless tobacco: Never Used   Vaping Use     Vaping Use: Never used   Substance and Sexual Activity     Alcohol use: Not Currently     Drug use: Never     Sexual activity: Yes     Partners: Male  "  Other Topics Concern     Not on file   Social History Narrative     Not on file     Social Determinants of Health     Financial Resource Strain: Not on file   Food Insecurity: Not on file   Transportation Needs: Not on file   Physical Activity: Not on file   Stress: Not on file   Social Connections: Not on file   Intimate Partner Violence: Not on file   Housing Stability: Not on file       Family History     No family history on file.    ROS     12 ROS completed, pertinent positive and negative in HPI    Physical Exam   BP (!) 150/95 (BP Location: Left arm, Patient Position: Sitting, Cuff Size: Adult Regular)   Pulse 86   Ht 1.499 m (4' 11\")   Wt 81.3 kg (179 lb 3.2 oz)   BMI 36.19 kg/m       General: Comfortable, no obvious distress, normal body habitus  Eyes: Sclera anicteric, moist conjunctiva  HENT: Atraumatic, oropharynx clear, moist mucous membranes with no mucosal ulcerations  Neck: Trachea midline, supple.    CV: normal rate.   Resp:  good effort, no evidence of loud wheezing  Abdomen:  obese, central obesity, non distended.   Skin: No rashes, lesions, or subcutaneous nodules on exposed skin. Very small purple stretch janeth on her lower abdomen.  Psych: Alert and oriented x 3. Appropriate affect, good insight  Extremities: No peripheral edema  Musculoskeletal: Appropriate muscle bulk and strength  Neuro: Moves all four extremities. No focal deficits on limited exam. Gait normal.     Labs/Imaging     Pertinent Labs were reviewed and updated in EPIC and discussed briefly.  Radiology Results were  reviewed and updated in EPIC.    Summary of recent findings:   Lab Results   Component Value Date    A1C 5.5 01/27/2022       TSH   Date Value Ref Range Status   01/27/2022 1.66 0.40 - 4.00 mU/L Final       Creatinine   Date Value Ref Range Status   02/15/2022 0.80 0.52 - 1.04 mg/dL Final       No results for input(s): CHOL, HDL, LDL, TRIG, CHOLHDLRATIO in the last 69540 hours.    No results found for: " [id number], [id number], [id number]    I personally reviewed the patient's outside records from [facility] EMR and Care Everywhere. Summary of pertinent findings in HPI.    Impression / Plan     1.  Infertility  2.  Elevated 17 hydroxyprogesterone    She had elevated 17 hydroxyprogesterone of 811 during follicular phase.  She does not have clinical manifestation of CAH (regular periods, no acne, mild hirsutism).  Fertility work-up is otherwise unremarkable.  We discussed today the possible diagnosis of nonclassical congenital adrenal hyperplasia given her elevated 17 hydroxyprogesterone despite the absence of symptoms.  It would be hard to tell if that is contributing to her infertility given regular periods and evidence of ovulation on ovulatory kits.  We  discussed confirming the diagnosis with high-dose ACTH stim test and option for treatment with steroids.  Glucocorticoid therapy is usually used to restore ovulation, regular and thus increase the chance of conception.    Plan:  -ACTH stim test checking for 17 hydroxy progesterone    3.  Low potassium  Potassium was 3 on labs from February 2022 when she was in the emergency department for kidney stone.  Likely due to low oral intake.  Previous potassium checked in 2011 was 4 .plan to recheck at    4.  Elevated blood pressure today  One-time elevation of blood pressure which she relates to running to clinic and anxiety because of driving in the city.  Previous blood pressure in her records within normal limits.  Discussed with patient to keep an eye on it , if it remains elevated work-up and treatment will be needed . no need for intervention at this point.    5.  Obesity class II  No evidence of underlying endocrinopathy. She has central obesity and new onset 10 lbs wt gain with small new purplish stretch marks other wise no symptoms for Cushing. She needs lifestyle intervention, would offer nutrition referral next visit. She is not a candidate for wt loss medication  now that she is actevily trying to conceive.      Test and/or medications prescribed today:  Orders Placed This Encounter   Procedures     Potassium         Follow up: 3 month       Dena Molina MD  Endocrinology, Diabetes and Metabolism  [facility]    Review of the result(s) of each unique test -   65 minutes spent on the date of the encounter doing chart review, history and exam, documentation and further activities per the note    Answers for HPI/ROS submitted by the patient on 5/8/2022  General Symptoms: No  Skin Symptoms: No  HENT Symptoms: No  EYE SYMPTOMS: No  HEART SYMPTOMS: No  LUNG SYMPTOMS: No  INTESTINAL SYMPTOMS: No  URINARY SYMPTOMS: No  GYNECOLOGIC SYMPTOMS: No  BREAST SYMPTOMS: No  SKELETAL SYMPTOMS: No  BLOOD SYMPTOMS: No  NERVOUS SYSTEM SYMPTOMS: No  MENTAL HEALTH SYMPTOMS: No

## 2022-05-11 NOTE — PROGRESS NOTES
Endocrinology Clinic Visit 5/11/2022    NAME:  Franchesca Brewer  PCP:  Clinic, [facility]  MRN:  [id number]  Reason for Consult:  elevated 17 hydroxyprogesterone and infertility  Requesting Provider:  Stefany Rivers    Chief Complaint     Chief Complaint   Patient presents with     Consult     Female infertility       History of Present Illness     Franchesca Brewer is a 31 year old female who is seen in clinic for elevated 17 hydroxyprogesterone and infertility    Patient and  have been trying to conceive since November 2020. She has no previous pregnancies,  has no previous biologic kids. She established with Dr. Beatty OB/GYN for fertility work up.  She reported regular periods since puberty every 28 days , last 5-7 days. She has been taking home ovulation tests which have shown positive results. Never been on OCP. She denied any acne. She did report mild hirsutism ( upper lip, under chin , and belly bottom), she thought it is genetic because her mother is  in origin. Since marriage 11/2020 she gained 20 lbs which she related to mood changes due to stress and infertility. She did gain 10 lbs in the past few month despite activity and diet changes and noted one small purple skin stretch janeth on her lower belly. She said she fairly bruises easily in the past couple years. No big changes in energy level , no weakness. BP today was up to 150/95 but usually 110-120 systolic. ROS otherwise negative.    She is on prenatal vitamins, no other meds, no other OTC, never been on steroids before.    She had hormonal work up for fertility which was significant for elevated 17 hydroxyprogesterone at 811 done on 2/17/2020 2 in the afternoon at 1406.  Patient reported test was done 8 days after her menstrual period, it was done right after she w woke up. she usually sleeps all morning and wakes up in the afternoon (work hours are 3 PM till midnight).  Other significant labs are  normal TSH, prolactin, testosterone, FSH, LH, estradiol.  DHEA S in the upper limit of normal at 431.    Imaging: Normal pelvic ultrasound, unremarkable hysterosalpingogram.    Of note,  did not do an analysis yet, appointment scheduled in June.    Social: she works as  at [employer], she does not smoke ( she stopped 5 years ago), no alcohol, no illicit drugs ( occasional weed).    Family hx: father with crohns, aunts with breast cancer, aunt with dementia      Problem List     Patient Active Problem List   Diagnosis     Female infertility        Medications     Current Outpatient Medications   Medication     Prenatal Vit-Fe Fumarate-FA (PRENATAL MULTIVITAMIN W/IRON) 27-0.8 MG tablet     No current facility-administered medications for this visit.        Allergies     No Known Allergies    Medical / Surgical History     No past medical history on file.  Past Surgical History:   Procedure Laterality Date     MYRINGOTOMY, INSERT TUBE BILATERAL, COMBINED       [place] REMOVE TONSILS/ADENOIDS,<13 Y/O      age 9       Social History     Social History     Socioeconomic History     Marital status:      Spouse name: Not on file     Number of children: Not on file     Years of education: Not on file     Highest education level: Not on file   Occupational History     Not on file   Tobacco Use     Smoking status: Former Smoker     Packs/day: 0.50     Years: 7.00     Pack years: 3.50     Smokeless tobacco: Never Used   Vaping Use     Vaping Use: Never used   Substance and Sexual Activity     Alcohol use: Not Currently     Drug use: Never     Sexual activity: Yes     Partners: Male   Other Topics Concern     Not on file   Social History Narrative     Not on file     Social Determinants of Health     Financial Resource Strain: Not on file   Food Insecurity: Not on file   Transportation Needs: Not on file   Physical Activity: Not on file   Stress: Not on file   Social Connections: Not on file   Intimate  "Partner Violence: Not on file   Housing Stability: Not on file       Family History     No family history on file.    ROS     12 ROS completed, pertinent positive and negative in HPI    Physical Exam   BP (!) 150/95 (BP Location: Left arm, Patient Position: Sitting, Cuff Size: Adult Regular)   Pulse 86   Ht 1.499 m (4' 11\")   Wt 81.3 kg (179 lb 3.2 oz)   BMI 36.19 kg/m       General: Comfortable, no obvious distress, normal body habitus  Eyes: Sclera anicteric, moist conjunctiva  HENT: Atraumatic, oropharynx clear, moist mucous membranes with no mucosal ulcerations  Neck: Trachea midline, supple.    CV: normal rate.   Resp:  good effort, no evidence of loud wheezing  Abdomen:  obese, central obesity, non distended.   Skin: No rashes, lesions, or subcutaneous nodules on exposed skin. Very small purple stretch janeth on her lower abdomen.  Psych: Alert and oriented x 3. Appropriate affect, good insight  Extremities: No peripheral edema  Musculoskeletal: Appropriate muscle bulk and strength  Neuro: Moves all four extremities. No focal deficits on limited exam. Gait normal.     Labs/Imaging     Pertinent Labs were reviewed and updated in EPIC and discussed briefly.  Radiology Results were  reviewed and updated in EPIC.    Summary of recent findings:   Lab Results   Component Value Date    A1C 5.5 01/27/2022       TSH   Date Value Ref Range Status   01/27/2022 1.66 0.40 - 4.00 mU/L Final       Creatinine   Date Value Ref Range Status   02/15/2022 0.80 0.52 - 1.04 mg/dL Final       No results for input(s): CHOL, HDL, LDL, TRIG, CHOLHDLRATIO in the last 10460 hours.    No results found for: [id number], [id number], [id number]    I personally reviewed the patient's outside records from [facility] EMR and Care Everywhere. Summary of pertinent findings in HPI.    Impression / Plan     1.  Infertility  2.  Elevated 17 hydroxyprogesterone    She had elevated 17 hydroxyprogesterone of 811 during follicular phase.  She does not have " clinical manifestation of CAH (regular periods, no acne, mild hirsutism).  Fertility work-up is otherwise unremarkable.  We discussed today the possible diagnosis of nonclassical congenital adrenal hyperplasia given her elevated 17 hydroxyprogesterone despite the absence of symptoms.  It would be hard to tell if that is contributing to her infertility given regular periods and evidence of ovulation on ovulatory kits.  We  discussed confirming the diagnosis with high-dose ACTH stim test and option for treatment with steroids.  Glucocorticoid therapy is usually used to restore ovulation, regular and thus increase the chance of conception.    Plan:  -ACTH stim test checking for 17 hydroxy progesterone    3.  Low potassium  Potassium was 3 on labs from February 2022 when she was in the emergency department for kidney stone.  Likely due to low oral intake.  Previous potassium checked in 2011 was 4 .plan to recheck at    4.  Elevated blood pressure today  One-time elevation of blood pressure which she relates to running to clinic and anxiety because of driving in the city.  Previous blood pressure in her records within normal limits.  Discussed with patient to keep an eye on it , if it remains elevated work-up and treatment will be needed . no need for intervention at this point.    5.  Obesity class II  No evidence of underlying endocrinopathy. She has central obesity and new onset 10 lbs wt gain with small new purplish stretch marks other wise no symptoms for Cushing. She needs lifestyle intervention, would offer nutrition referral next visit. She is not a candidate for wt loss medication now that she is actevily trying to conceive.      Test and/or medications prescribed today:  Orders Placed This Encounter   Procedures     Potassium         Follow up: 3 month       Dena Molina MD  Endocrinology, Diabetes and Metabolism  [facility]    Review of the result(s) of each unique test -   65 minutes spent on  the date of the encounter doing chart review, history and exam, documentation and further activities per the note    Answers for HPI/ROS submitted by the patient on 5/8/2022  General Symptoms: No  Skin Symptoms: No  HENT Symptoms: No  EYE SYMPTOMS: No  HEART SYMPTOMS: No  LUNG SYMPTOMS: No  INTESTINAL SYMPTOMS: No  URINARY SYMPTOMS: No  GYNECOLOGIC SYMPTOMS: No  BREAST SYMPTOMS: No  SKELETAL SYMPTOMS: No  BLOOD SYMPTOMS: No  NERVOUS SYSTEM SYMPTOMS: No  MENTAL HEALTH SYMPTOMS: No

## 2022-05-11 NOTE — NURSING NOTE
"Chief Complaint   Patient presents with     Consult     Female infertility     Vital signs:      BP: (!) 150/95 Pulse: 86           Height: 149.9 cm (4' 11\") Weight: 81.3 kg (179 lb 3.2 oz)  Estimated body mass index is 36.19 kg/m  as calculated from the following:    Height as of this encounter: 1.499 m (4' 11\").    Weight as of this encounter: 81.3 kg (179 lb 3.2 oz).        "

## 2022-05-11 NOTE — PATIENT INSTRUCTIONS
It was nice meeting you    Lets schedule a test to confirm the elevation of 17-hydroxyprogesterone, test is called ACTH stimulation test.     You had low potassium on old lab, ask them to draw a potassium level

## 2022-07-22 DIAGNOSIS — N97.9 FEMALE INFERTILITY: Primary | ICD-10-CM

## 2022-07-22 DIAGNOSIS — Z32.01 PREGNANCY TEST POSITIVE: ICD-10-CM

## 2022-07-26 ENCOUNTER — LAB (OUTPATIENT)
Dept: LAB | Facility: CLINIC | Age: 32
End: 2022-07-26
Payer: COMMERCIAL

## 2022-07-26 DIAGNOSIS — Z32.01 PREGNANCY TEST POSITIVE: ICD-10-CM

## 2022-07-26 LAB — B-HCG SERPL-ACNC: 941 IU/L (ref 0–5)

## 2022-07-26 PROCEDURE — 84702 CHORIONIC GONADOTROPIN TEST: CPT

## 2022-07-26 PROCEDURE — 36415 COLL VENOUS BLD VENIPUNCTURE: CPT

## 2022-07-29 ENCOUNTER — LAB (OUTPATIENT)
Dept: LAB | Facility: CLINIC | Age: 32
End: 2022-07-29
Payer: COMMERCIAL

## 2022-07-29 DIAGNOSIS — Z32.01 PREGNANCY TEST POSITIVE: ICD-10-CM

## 2022-07-29 LAB — B-HCG SERPL-ACNC: 3693 IU/L (ref 0–5)

## 2022-07-29 PROCEDURE — 84702 CHORIONIC GONADOTROPIN TEST: CPT

## 2022-07-29 PROCEDURE — 36415 COLL VENOUS BLD VENIPUNCTURE: CPT

## 2022-08-11 ENCOUNTER — PRENATAL OFFICE VISIT (OUTPATIENT)
Dept: OBGYN | Facility: CLINIC | Age: 32
End: 2022-08-11
Payer: COMMERCIAL

## 2022-08-11 DIAGNOSIS — Z34.00 PRENATAL CARE, FIRST PREGNANCY: ICD-10-CM

## 2022-08-11 PROCEDURE — 99207 PR NO CHARGE NURSE ONLY: CPT | Performed by: OBSTETRICS & GYNECOLOGY

## 2022-08-11 NOTE — PROGRESS NOTES
Lifestyle and nutrition teaching completed   Quyen Piedra OB Intake Nurse    Patient supplied answers from flow sheet for:  Prenatal OB Questionnaire.

## 2022-08-17 ENCOUNTER — PRENATAL OFFICE VISIT (OUTPATIENT)
Dept: OBGYN | Facility: CLINIC | Age: 32
End: 2022-08-17
Payer: COMMERCIAL

## 2022-08-17 VITALS
HEIGHT: 59 IN | WEIGHT: 177.9 LBS | SYSTOLIC BLOOD PRESSURE: 124 MMHG | TEMPERATURE: 98.4 F | DIASTOLIC BLOOD PRESSURE: 86 MMHG | HEART RATE: 73 BPM | RESPIRATION RATE: 16 BRPM | BODY MASS INDEX: 35.86 KG/M2

## 2022-08-17 DIAGNOSIS — Z34.01 ENCOUNTER FOR PRENATAL CARE IN FIRST TRIMESTER OF FIRST PREGNANCY: Primary | ICD-10-CM

## 2022-08-17 LAB
ABO/RH(D): NORMAL
ALBUMIN UR-MCNC: NEGATIVE MG/DL
ANTIBODY SCREEN: NEGATIVE
APPEARANCE UR: ABNORMAL
BACTERIA #/AREA URNS HPF: ABNORMAL /HPF
BILIRUB UR QL STRIP: NEGATIVE
COLOR UR AUTO: YELLOW
ERYTHROCYTE [DISTWIDTH] IN BLOOD BY AUTOMATED COUNT: 11.9 % (ref 10–15)
GLUCOSE UR STRIP-MCNC: NEGATIVE MG/DL
HCT VFR BLD AUTO: 37.2 % (ref 35–47)
HGB BLD-MCNC: 12.9 G/DL (ref 11.7–15.7)
HGB UR QL STRIP: NEGATIVE
KETONES UR STRIP-MCNC: NEGATIVE MG/DL
LEUKOCYTE ESTERASE UR QL STRIP: NEGATIVE
MCH RBC QN AUTO: 32 PG (ref 26.5–33)
MCHC RBC AUTO-ENTMCNC: 34.7 G/DL (ref 31.5–36.5)
MCV RBC AUTO: 92 FL (ref 78–100)
MUCOUS THREADS #/AREA URNS LPF: PRESENT /LPF
NITRATE UR QL: NEGATIVE
PH UR STRIP: 5.5 [PH] (ref 5–7)
PLATELET # BLD AUTO: 264 10E3/UL (ref 150–450)
RBC # BLD AUTO: 4.03 10E6/UL (ref 3.8–5.2)
RBC #/AREA URNS AUTO: ABNORMAL /HPF
SP GR UR STRIP: 1.01 (ref 1–1.03)
SPECIMEN EXPIRATION DATE: NORMAL
SQUAMOUS #/AREA URNS AUTO: ABNORMAL /LPF
T PALLIDUM AB SER QL: NONREACTIVE
UROBILINOGEN UR STRIP-ACNC: 0.2 E.U./DL
WBC # BLD AUTO: 11.4 10E3/UL (ref 4–11)
WBC #/AREA URNS AUTO: ABNORMAL /HPF
WBC CLUMPS #/AREA URNS HPF: PRESENT /HPF

## 2022-08-17 PROCEDURE — 87591 N.GONORRHOEAE DNA AMP PROB: CPT | Performed by: OBSTETRICS & GYNECOLOGY

## 2022-08-17 PROCEDURE — 36415 COLL VENOUS BLD VENIPUNCTURE: CPT | Performed by: OBSTETRICS & GYNECOLOGY

## 2022-08-17 PROCEDURE — 86803 HEPATITIS C AB TEST: CPT | Performed by: OBSTETRICS & GYNECOLOGY

## 2022-08-17 PROCEDURE — 87491 CHLMYD TRACH DNA AMP PROBE: CPT | Performed by: OBSTETRICS & GYNECOLOGY

## 2022-08-17 PROCEDURE — 76817 TRANSVAGINAL US OBSTETRIC: CPT | Performed by: OBSTETRICS & GYNECOLOGY

## 2022-08-17 PROCEDURE — 87086 URINE CULTURE/COLONY COUNT: CPT | Performed by: OBSTETRICS & GYNECOLOGY

## 2022-08-17 PROCEDURE — 86901 BLOOD TYPING SEROLOGIC RH(D): CPT | Performed by: OBSTETRICS & GYNECOLOGY

## 2022-08-17 PROCEDURE — 86900 BLOOD TYPING SEROLOGIC ABO: CPT | Performed by: OBSTETRICS & GYNECOLOGY

## 2022-08-17 PROCEDURE — 86780 TREPONEMA PALLIDUM: CPT | Performed by: OBSTETRICS & GYNECOLOGY

## 2022-08-17 PROCEDURE — 87340 HEPATITIS B SURFACE AG IA: CPT | Performed by: OBSTETRICS & GYNECOLOGY

## 2022-08-17 PROCEDURE — 87389 HIV-1 AG W/HIV-1&-2 AB AG IA: CPT | Performed by: OBSTETRICS & GYNECOLOGY

## 2022-08-17 PROCEDURE — 86762 RUBELLA ANTIBODY: CPT | Performed by: OBSTETRICS & GYNECOLOGY

## 2022-08-17 PROCEDURE — 99207 PR FIRST OB VISIT: CPT | Performed by: OBSTETRICS & GYNECOLOGY

## 2022-08-17 PROCEDURE — 86850 RBC ANTIBODY SCREEN: CPT | Performed by: OBSTETRICS & GYNECOLOGY

## 2022-08-17 PROCEDURE — 87186 SC STD MICRODIL/AGAR DIL: CPT | Performed by: OBSTETRICS & GYNECOLOGY

## 2022-08-17 PROCEDURE — 85027 COMPLETE CBC AUTOMATED: CPT | Performed by: OBSTETRICS & GYNECOLOGY

## 2022-08-17 PROCEDURE — 81001 URINALYSIS AUTO W/SCOPE: CPT | Performed by: OBSTETRICS & GYNECOLOGY

## 2022-08-17 NOTE — PROGRESS NOTES
"[facility]    New OB Visit Note    CC: New OB     Subjective:    Franchesca is a 31 year old  at 8w0d by LMP 22 who presents for her initial OB visit. This is a planned pregnancy and her and her partner very are excited. She reports feeling well. Denies any uterine cramping, abdominal pain or vaginal bleeding. Has mild nausea and no vomiting.       Pt reports that she feels safe at home and her mood is good.   Concerns: none    Past OB History: none       OB History    Para Term  AB Living   1 0 0 0 0 0   SAB IAB Ectopic Multiple Live Births   0 0 0 0 0      # Outcome Date GA Lbr Bharat/2nd Weight Sex Delivery Anes PTL Lv   1 Current                History reviewed. No pertinent past medical history.    Past Surgical History:   Procedure Laterality Date     MYRINGOTOMY, INSERT TUBE BILATERAL, COMBINED       IN REMOVE TONSILS/ADENOIDS,<11 Y/O      age 9       Current Outpatient Medications   Medication Sig Dispense Refill     Prenatal Vit-Fe Fumarate-FA (PRENATAL MULTIVITAMIN W/IRON) 27-0.8 MG tablet Take 1 tablet by mouth daily         Family History   Problem Relation Age of Onset     Crohn's Disease Father        Social History     Tobacco Use     Smoking status: Former Smoker     Packs/day: 0.50     Years: 7.00     Pack years: 3.50     Quit date: 2017     Years since quittin.2     Smokeless tobacco: Never Used   Substance Use Topics     Alcohol use: Not Currently       ROS: A 10 pt ROS was completed and found to be negative unless mentioned in the HPI.     Objective:    /86 (BP Location: Right arm, Patient Position: Chair, Cuff Size: Adult Regular)   Pulse 73   Temp 98.4  F (36.9  C) (Tympanic)   Resp 16   Ht 1.492 m (4' 10.\")   Wt 80.7 kg (177 lb 14.4 oz)   LMP 2022   Breastfeeding No   BMI 36.24 kg/m      Estimated body mass index is 36.24 kg/m  as calculated from the following:    Height as of this encounter: 1.492 m (4' 10.75\").    " Weight as of this encounter: 80.7 kg (177 lb 14.4 oz).    General appearance: well-hydrated, A&O x 3, no apparent distress  Lungs: Equal expansion bilaterally, no accessory muscle use  Heart: No heaves or thrills. No peripheral varicosities  Constitutional: See vitals  Abdomen: Soft, non-tender, non-distended. No rebound, rigidity, or guarding.  Extremities: no edema  Genitourinary:  External genitalia: no erythema, no lesions.   Urethral meatus appropriate location without lesions or prolapse  Urethra: No masses, tenderness, or scarring  Bladder no fullness, masses, or tenderness.  Anus and Perineum: Unremarkable, no visible lesions  Vagina: Normal, healthy pink mucosa without any lesions. Physiologic vaginal discharge.   Cervix: normal appearance, no cervical motion tenderness.   Uterus: gravid  Adnexa: no masses or tenderness bilaterally.    Bedside Ultrasound    Transvaginal ultrasound was performed. A viable intrauterine pregnancy was seen.      CRL= 1.55 cm   FHT= fetal cardiac activity visible, but not measured  EGA= 8 weeks 0 days  EDC based on US = 3/29/23  EDC by LMP= 3/29/23  FINAL EDC= 3/29/23            Assessment and Plan:     Encounter Diagnosis   Name Primary?     Encounter for prenatal care in first trimester of first pregnancy Yes       31 year old  at 8w0d  by LMP confirmed by ultrasound today who presents for her initial OB visit.     1) Plan to draw new OB lab today   2) Discussed routine prenatal care, group practice model at [facility], tertiary support and frequency of visits. Also discussed options for genetic screening tests. Patient desires cell free DNA for genetic testing. She will do this next visit.  3) Dating/viability US performed today.  Consistent with LMP  4) Concerns: none  5) PMH/OBHx problems: none  6) Medication review: no changes, continue prenatal vitamin   7) Reviewed miscarriage precautions (present to ED or call clinic with abdominal pain, vaginal bleeding or  fever)   8) Weight gain: discussed weight gain expectations (BMI <18.5: 28-40lbs, BMI 18.5-25: 25-35lbs, BMI 25-30: 15-25lbs, BMI >30: 11-20lbs)   9) PAP smear: not due  10) Delivery plan: continue to discuss route of delivery, breastfeeding, pp contraception, pain management in labor  11) Immunizations: Tdap at 28wks   12) No increased risk for gestational diabetes, plan for screen at 28wks    Return to clinic in 4 weeks or prn.

## 2022-08-17 NOTE — PROGRESS NOTES
"Franchesca Brewer is a 31 year old {MARITAL STATUS (OB):269554} who presents to the clinic for an new ob visit.    Estimated Date of Delivery: Mar 29, 2023 is calculated from Patient's last menstrual period was 2022.   She {HAS BLEEDIN::\"has not had bleeding since her LMP\"}.   She {NAUSEA:761860::\"has had mild nausea.\"} Weigh loss {WEIGHT LOSS:143687::\"has not occurred.\"}   This {WAS/WAS NOT:9033::\"was\"} a planned pregnancy.   FOB {isnot:609676::\"is\"} involved,  ***   OTHER CONCERNS: ***  INFECTION HISTORY  HIV: no  Hepatitis B: no  Hepatitis C: no  Syphilis:  no  Tuberculosis: no   PPD- {YES NO RISK:196954::\"no\"}  Herpes self: {YES NO RISK:326325::\"no\"}  Herpes partner:  {YES NO RISK:471396::\"no\"}  Chlamydia:  {YES NO RISK:087666::\"no\"}  Gonorrhea:  {YES NO RISK:406747::\"no\"}  HPV: {YES NO RISK:767575::\"no\"}  BV:  {YES NO RISK:837505::\"no\"}  Trichomonis:  {YES NO RISK:802345::\"no\"}  Chicken Pox:  {YES NO RISK:443599::\"YES\"}  ====================================================  PERSONAL/SOCIAL HISTORY  Lives {LIVES WITH OB:821901}.  Employment: {EMPLOYMENT STATUS:351399}.  Her job involves {WORK TYPES:200020} .  Additional items: {Social items:678916}  =====================================================   REVIEW OF SYSTEMS  {ROS NORMAL:630888::\"CONSTITUTIONAL: NEGATIVE for fever, chills\",\"EYES: NEGATIVE for vision changes \",\"RESP: NEGATIVE for significant cough or SOB\",\"CV: NEGATIVE for chest pain, palpitations \",\"GI: NEGATIVE for nausea, abdominal pain, heartburn, or change in bowel habits\",\": NEGATIVE for frequency, dysuria, or hematuria\",\"MUSCULOSKELETAL: NEGATIVE for significant arthralgias or myalgia\",\"NEURO: NEGATIVE for weakness, dizziness or paresthesias or headache\"}  ====================================================  PHYSICAL EXAM:  /86 (BP Location: Right arm, Patient Position: Chair, Cuff Size: Adult Regular)   Pulse 73   Temp 98.4  F (36.9  C) (Tympanic)   Resp 16   Ht " "1.492 m (4' 10.75\")   Wt 80.7 kg (177 lb 14.4 oz)   LMP 06/22/2022   Breastfeeding No   BMI 36.24 kg/m    BMI- Body mass index is 36.24 kg/m .,     RECOMMENDED WEIGHT GAIN: {WGT GAIN:592005}  GENERAL:  Pleasant pregnant female, alert, well groomed.  SKIN:  Warm and dry, without lesions or rashes  HEAD: Symmetrical features.  EYES:  PERRLA,   MOUTH:  Buccal mucosa pink, moist without lesions.    NECK:  Thyroid without enlargement and nodules.  Lymph nodes not palpable. ***  LUNGS:  Clear to auscultation.  BREAST:  Symmetrical.  No dominant, fixed or suspicious masses are noted.  No skin or nipple changes or axillary nodes.  Self exam is taught and encouraged.  Nipples {NIPPLES:897539::\"everted\"}.      HEART:  RRR without murmur.  ABDOMEN: Soft without masses , tenderness or organomegaly.  No CVA tenderness. {ABD OB:190371}. FHT ***  MUSCULOSKELETAL:  Full range of motion  EXTREMITIES:  No edema. No significant varicosities.   GENITALIA:  BUS WNL, no lesions noted ***  VAGINA:  Pink, normal rugae and discharge {GYN VAGINAL DISCHARGE:721}, ***  CERVIX:  smooth, without discharge or CMT and {CERVIC2:349745} os,   {CERVIX 3:587634::\"firm/ closed\"} {NUMBERS1-5:664420::\"2\"} cm long.  UTERUS: {UTERINE POSITION:487960::\"Anteverted\"}, nontender {4-11:151210} weeks in size.  ADNEXA: {ADNEXA OB:365431::\"Without masses or tenderness\"}  PELVIS:  Arch; {ARCH:507434}. Sacrum; {SACRUM:789251}. Spines;{SPINES:517188}.  Side walls; {SIDE WALLS:335895}. Type; {PELVIS TYPE:230439}  PELVIS:   {km bony pelvis:743388::\"Adequate\"}, Pelvis proven to {BIRTH WEIGHT:561731}.  RECTAL:  Normal appearance.  Digital exam deferred.  WET PREP:{VAGINAL WET PREP:139494::\"Not done\"}  gonorrhea  =========================================  ASSESSMENT:  (Z34.01) Encounter for prenatal care in first trimester of first pregnancy  (primary encounter diagnosis)  Comment: ***  Plan: Chlamydia trachomatis/Neisseria gonorrhoeae by         PCR        " "***    PREGNANCY AT RISK? {YES NO RISK:455011::\"no\"}  ==========================================  PLAN:  Instructed on use of triage nurse line and contacting the on call CNM after hours for an urgent need such as fever, vagina bleeding, bladder or vaginal infection, rupture of membranes,  or term labor.    Discussed the indications, uses for and false positives for quad screen, nuchal translucency and fetal survey ultrasound at 18-20 weeks gestation. Will inform us at the next visit if she wished to avail herself of these screens.  Instructed on best evidence for: weight gain for her BMI for pregnancy; healthy diet and foods to avoid; exercise and activity during pregnancy;avoiding exposure to toxoplasmosis; and maintenance of a generally healthy lifestyle.   Discussed the harms, benefits, side effects and alternative therapies for current prescribed and OTC medications.  ***    "

## 2022-08-18 ENCOUNTER — ALLIED HEALTH/NURSE VISIT (OUTPATIENT)
Dept: OBGYN | Facility: CLINIC | Age: 32
End: 2022-08-18
Payer: COMMERCIAL

## 2022-08-18 DIAGNOSIS — Z34.01 ENCOUNTER FOR PRENATAL CARE IN FIRST TRIMESTER OF FIRST PREGNANCY: Primary | ICD-10-CM

## 2022-08-18 LAB
ALBUMIN UR-MCNC: NEGATIVE MG/DL
APPEARANCE UR: ABNORMAL
BACTERIA #/AREA URNS HPF: ABNORMAL /HPF
BILIRUB UR QL STRIP: NEGATIVE
C TRACH DNA SPEC QL PROBE+SIG AMP: NEGATIVE
COLOR UR AUTO: YELLOW
GLUCOSE UR STRIP-MCNC: NEGATIVE MG/DL
HBV SURFACE AG SERPL QL IA: NONREACTIVE
HCV AB SERPL QL IA: NONREACTIVE
HGB UR QL STRIP: NEGATIVE
HIV 1+2 AB+HIV1 P24 AG SERPL QL IA: NONREACTIVE
KETONES UR STRIP-MCNC: NEGATIVE MG/DL
LEUKOCYTE ESTERASE UR QL STRIP: NEGATIVE
MUCOUS THREADS #/AREA URNS LPF: PRESENT /LPF
N GONORRHOEA DNA SPEC QL NAA+PROBE: NEGATIVE
NITRATE UR QL: NEGATIVE
PH UR STRIP: 5.5 [PH] (ref 5–7)
RBC #/AREA URNS AUTO: ABNORMAL /HPF
RUBV IGG SERPL QL IA: 2.61 INDEX
RUBV IGG SERPL QL IA: POSITIVE
SP GR UR STRIP: 1.02 (ref 1–1.03)
SQUAMOUS #/AREA URNS AUTO: ABNORMAL /LPF
UROBILINOGEN UR STRIP-ACNC: 0.2 E.U./DL
WBC #/AREA URNS AUTO: ABNORMAL /HPF
WBC CLUMPS #/AREA URNS HPF: PRESENT /HPF

## 2022-08-18 PROCEDURE — 87186 SC STD MICRODIL/AGAR DIL: CPT

## 2022-08-18 PROCEDURE — 87086 URINE CULTURE/COLONY COUNT: CPT

## 2022-08-18 PROCEDURE — 99207 PR NO CHARGE NURSE ONLY: CPT

## 2022-08-18 PROCEDURE — 81001 URINALYSIS AUTO W/SCOPE: CPT

## 2022-08-20 LAB — BACTERIA UR CULT: ABNORMAL

## 2022-08-23 DIAGNOSIS — O23.41 URINARY TRACT INFECTION IN MOTHER DURING FIRST TRIMESTER OF PREGNANCY: Primary | ICD-10-CM

## 2022-08-23 LAB
BACTERIA UR CULT: ABNORMAL
BACTERIA UR CULT: ABNORMAL

## 2022-08-23 RX ORDER — AMOXICILLIN 875 MG
875 TABLET ORAL 2 TIMES DAILY
Qty: 10 TABLET | Refills: 0 | Status: SHIPPED | OUTPATIENT
Start: 2022-08-23 | End: 2022-10-12

## 2022-08-23 NOTE — RESULT ENCOUNTER NOTE
Pt notified of below.  Pt reports understanding.  Pt does not have further questions or concerns.    Xiao Kendall   Ob/Gyn Clinic  RN

## 2022-08-23 NOTE — RESULT ENCOUNTER NOTE
Please call Franchesca Brewer and let her know that I sent amoxicillin to the pharmacy for a urinary tract infection.

## 2022-09-14 ENCOUNTER — PRENATAL OFFICE VISIT (OUTPATIENT)
Dept: OBGYN | Facility: CLINIC | Age: 32
End: 2022-09-14
Payer: COMMERCIAL

## 2022-09-14 VITALS
BODY MASS INDEX: 35.24 KG/M2 | HEART RATE: 71 BPM | HEIGHT: 59 IN | SYSTOLIC BLOOD PRESSURE: 121 MMHG | TEMPERATURE: 98.5 F | DIASTOLIC BLOOD PRESSURE: 82 MMHG | RESPIRATION RATE: 16 BRPM | WEIGHT: 174.8 LBS

## 2022-09-14 DIAGNOSIS — Z3A.12 12 WEEKS GESTATION OF PREGNANCY: Primary | ICD-10-CM

## 2022-09-14 DIAGNOSIS — Z87.440 PERSONAL HISTORY OF URINARY TRACT INFECTION: ICD-10-CM

## 2022-09-14 LAB
ALBUMIN UR-MCNC: NEGATIVE MG/DL
APPEARANCE UR: ABNORMAL
BACTERIA #/AREA URNS HPF: ABNORMAL /HPF
BILIRUB UR QL STRIP: NEGATIVE
COLOR UR AUTO: YELLOW
GLUCOSE UR STRIP-MCNC: NEGATIVE MG/DL
HGB UR QL STRIP: NEGATIVE
KETONES UR STRIP-MCNC: NEGATIVE MG/DL
LEUKOCYTE ESTERASE UR QL STRIP: NEGATIVE
MUCOUS THREADS #/AREA URNS LPF: PRESENT /LPF
NITRATE UR QL: NEGATIVE
PH UR STRIP: 5.5 [PH] (ref 5–7)
RBC #/AREA URNS AUTO: ABNORMAL /HPF
SP GR UR STRIP: 1.02 (ref 1–1.03)
SQUAMOUS #/AREA URNS AUTO: ABNORMAL /LPF
UROBILINOGEN UR STRIP-ACNC: 0.2 E.U./DL
WBC #/AREA URNS AUTO: ABNORMAL /HPF

## 2022-09-14 PROCEDURE — 99207 PR PRENATAL VISIT: CPT | Performed by: OBSTETRICS & GYNECOLOGY

## 2022-09-14 PROCEDURE — 81001 URINALYSIS AUTO W/SCOPE: CPT | Performed by: OBSTETRICS & GYNECOLOGY

## 2022-09-14 PROCEDURE — 36415 COLL VENOUS BLD VENIPUNCTURE: CPT | Performed by: OBSTETRICS & GYNECOLOGY

## 2022-09-14 NOTE — PROGRESS NOTES
"[facility]    Return OB Note    CC: Return OB     Subjective:  Franchesca is a 31 year old  at 12w0d   Denies vaginal bleeding, loss of fluid, or pelvic cramping. no fetal movement.  Complaints today: none    Objective:  /82 (BP Location: Left arm, Patient Position: Chair, Cuff Size: Adult Regular)   Pulse 71   Temp 98.5  F (36.9  C) (Tympanic)   Resp 16   Ht 1.486 m (4' 10.5\")   Wt 79.3 kg (174 lb 12.8 oz)   LMP 2022   Breastfeeding No   BMI 35.91 kg/m      See flowsheet    Assessment/Plan:     31 year old  at 12w0d   Encounter Diagnoses   Name Primary?     12 weeks gestation of pregnancy Yes     Personal history of urinary tract infection        First OB labs normal except positive urine culture.  Treated with antibiotics.  Repeat urine sent today.  Discussed genetic testing.  NIPT ordered today.      RTC 4 weeks or prn    Luzmaria Winston MD  "

## 2022-09-14 NOTE — NURSING NOTE
"Initial /82 (BP Location: Left arm, Patient Position: Chair, Cuff Size: Adult Regular)   Pulse 71   Temp 98.5  F (36.9  C) (Tympanic)   Resp 16   Ht 1.486 m (4' 10.5\")   Wt 79.3 kg (174 lb 12.8 oz)   LMP 06/22/2022   Breastfeeding No   BMI 35.91 kg/m   Estimated body mass index is 35.91 kg/m  as calculated from the following:    Height as of this encounter: 1.486 m (4' 10.5\").    Weight as of this encounter: 79.3 kg (174 lb 12.8 oz). .    Prerna Rogers CMA    "

## 2022-09-26 LAB — SCANNED LAB RESULT: NORMAL

## 2022-09-26 NOTE — RESULT ENCOUNTER NOTE
Pt notified of below.  Pt reports understanding.  Pt does not have further questions or concerns.  Patient desired to know gender by phone so was told it was a boy!    Xiao Kendall   Ob/Gyn Clinic  RN

## 2022-10-12 ENCOUNTER — PRENATAL OFFICE VISIT (OUTPATIENT)
Dept: OBGYN | Facility: CLINIC | Age: 32
End: 2022-10-12
Payer: COMMERCIAL

## 2022-10-12 VITALS
TEMPERATURE: 97.5 F | RESPIRATION RATE: 14 BRPM | SYSTOLIC BLOOD PRESSURE: 117 MMHG | WEIGHT: 177.6 LBS | HEART RATE: 68 BPM | HEIGHT: 59 IN | BODY MASS INDEX: 35.8 KG/M2 | DIASTOLIC BLOOD PRESSURE: 71 MMHG

## 2022-10-12 DIAGNOSIS — Z23 NEED FOR PROPHYLACTIC VACCINATION AND INOCULATION AGAINST INFLUENZA: ICD-10-CM

## 2022-10-12 DIAGNOSIS — Z34.02 ENCOUNTER FOR PRENATAL CARE IN SECOND TRIMESTER OF FIRST PREGNANCY: Primary | ICD-10-CM

## 2022-10-12 PROCEDURE — 36415 COLL VENOUS BLD VENIPUNCTURE: CPT | Performed by: OBSTETRICS & GYNECOLOGY

## 2022-10-12 PROCEDURE — 99000 SPECIMEN HANDLING OFFICE-LAB: CPT | Performed by: OBSTETRICS & GYNECOLOGY

## 2022-10-12 PROCEDURE — 90471 IMMUNIZATION ADMIN: CPT | Performed by: OBSTETRICS & GYNECOLOGY

## 2022-10-12 PROCEDURE — 90686 IIV4 VACC NO PRSV 0.5 ML IM: CPT | Performed by: OBSTETRICS & GYNECOLOGY

## 2022-10-12 PROCEDURE — 99207 PR PRENATAL VISIT: CPT | Performed by: OBSTETRICS & GYNECOLOGY

## 2022-10-12 PROCEDURE — 81511 FTL CGEN ABNOR FOUR ANAL: CPT | Mod: 90 | Performed by: OBSTETRICS & GYNECOLOGY

## 2022-10-12 NOTE — PROGRESS NOTES
"[facility]     Return OB Note     CC: Return OB      Subjective:  Franchesca is a 31 year old  at 16w0d   Denies vaginal bleeding, loss of fluid, or pelvic cramping. No fetal movement.  Complaints today: none     Objective:  /71 (BP Location: Left arm, Patient Position: Chair, Cuff Size: Adult Regular)   Pulse 68   Temp 97.5  F (36.4  C) (Tympanic)   Resp 14   Ht 1.486 m (4' 10.5\")   Wt 80.6 kg (177 lb 9.6 oz)   LMP 2022   BMI 36.49 kg/m       See flowsheet     Assessment/Plan:      31 year old  at 16w0d        Encounter Diagnoses   Name Primary?     12 weeks gestation of pregnancy Yes     Personal history of urinary tract infection           First OB labs normal except positive urine culture. Treated with antibiotics. Repeat urine sent today.  Discussed genetic testing.  NIPT nl BOY.   Offered AFP, would like today.     RTC 4 weeks or prn    Stefany Rivers MD  OB/GYN        "

## 2022-10-14 LAB
# FETUSES US: NORMAL
AFP MOM SERPL: 1.38
AFP SERPL-MCNC: 42 NG/ML
AGE - REPORTED: 31.4 YR
CURRENT SMOKER: NO
FAMILY MEMBER DISEASES HX: NO
GA METHOD: NORMAL
GA: NORMAL WK
HCG MOM SERPL: 1.09
HCG SERPL-ACNC: NORMAL IU/L
HX OF HEREDITARY DISORDERS: NO
IDDM PATIENT QL: NO
INHIBIN A MOM SERPL: 0.83
INHIBIN A SERPL-MCNC: 124 PG/ML
INTEGRATED SCN PATIENT-IMP: NORMAL
PATHOLOGY STUDY: NORMAL
SPECIMEN DRAWN SERPL: NORMAL
U ESTRIOL MOM SERPL: 1.66
U ESTRIOL SERPL-MCNC: 1.57 NG/ML

## 2022-11-15 ENCOUNTER — PRENATAL OFFICE VISIT (OUTPATIENT)
Dept: OBGYN | Facility: CLINIC | Age: 32
End: 2022-11-15
Payer: COMMERCIAL

## 2022-11-15 ENCOUNTER — HOSPITAL ENCOUNTER (OUTPATIENT)
Dept: ULTRASOUND IMAGING | Facility: CLINIC | Age: 32
Discharge: HOME | End: 2022-11-15
Attending: OBSTETRICS & GYNECOLOGY | Admitting: OBSTETRICS & GYNECOLOGY
Payer: COMMERCIAL

## 2022-11-15 VITALS
SYSTOLIC BLOOD PRESSURE: 108 MMHG | RESPIRATION RATE: 16 BRPM | WEIGHT: 182.1 LBS | HEART RATE: 68 BPM | DIASTOLIC BLOOD PRESSURE: 65 MMHG | TEMPERATURE: 98.1 F | BODY MASS INDEX: 36.71 KG/M2 | HEIGHT: 59 IN

## 2022-11-15 DIAGNOSIS — Z3A.20 20 WEEKS GESTATION OF PREGNANCY: Primary | ICD-10-CM

## 2022-11-15 DIAGNOSIS — Z34.02 ENCOUNTER FOR PRENATAL CARE IN SECOND TRIMESTER OF FIRST PREGNANCY: ICD-10-CM

## 2022-11-15 PROCEDURE — 99207 PR PRENATAL VISIT: CPT | Performed by: OBSTETRICS & GYNECOLOGY

## 2022-11-15 PROCEDURE — 76805 OB US >/= 14 WKS SNGL FETUS: CPT

## 2022-11-15 NOTE — NURSING NOTE
"Initial /65 (BP Location: Left arm, Patient Position: Chair, Cuff Size: Adult Regular)   Pulse 68   Temp 98.1  F (36.7  C) (Tympanic)   Resp 16   Ht 1.486 m (4' 10.5\")   Wt 82.6 kg (182 lb 1.6 oz)   LMP 06/22/2022   BMI 37.41 kg/m   Estimated body mass index is 37.41 kg/m  as calculated from the following:    Height as of this encounter: 1.486 m (4' 10.5\").    Weight as of this encounter: 82.6 kg (182 lb 1.6 oz). .    Prerna Rogers, VIKI    "

## 2022-11-15 NOTE — PROGRESS NOTES
"[facility]    Return OB Note    CC: Return OB     Subjective:  Franchesca is a 32 year old  at 20w6d   Denies vaginal bleeding, loss of fluid, or pelvic cramping. pos fetal movement.  Complaints today: acid reflux.  Taking Tums 4 times a day.    Objective:  /65 (BP Location: Left arm, Patient Position: Chair, Cuff Size: Adult Regular)   Pulse 68   Temp 98.1  F (36.7  C) (Tympanic)   Resp 16   Ht 1.486 m (4' 10.5\")   Wt 82.6 kg (182 lb 1.6 oz)   LMP 2022   BMI 37.41 kg/m      See flowsheet    Assessment/Plan:     32 year old  at 20w6d   Encounter Diagnosis   Name Primary?     20 weeks gestation of pregnancy Yes        First OB labs normal except positive urine culture. Treated. Repeat urine improved.   NIPT nl BOY.   Normal quad screen  Anatomy ultrasound done today, results pending.    RTC 4 weeks    Luzmaria Winston MD  "

## 2022-12-12 ENCOUNTER — PRENATAL OFFICE VISIT (OUTPATIENT)
Dept: OBGYN | Facility: CLINIC | Age: 32
End: 2022-12-12
Payer: COMMERCIAL

## 2022-12-12 VITALS
BODY MASS INDEX: 36.89 KG/M2 | DIASTOLIC BLOOD PRESSURE: 82 MMHG | HEIGHT: 59 IN | SYSTOLIC BLOOD PRESSURE: 114 MMHG | TEMPERATURE: 98.2 F | WEIGHT: 183 LBS | RESPIRATION RATE: 16 BRPM | HEART RATE: 74 BPM

## 2022-12-12 DIAGNOSIS — R73.09 ABNORMAL GLUCOSE TOLERANCE TEST: Primary | ICD-10-CM

## 2022-12-12 DIAGNOSIS — Z34.02 ENCOUNTER FOR PRENATAL CARE IN SECOND TRIMESTER OF FIRST PREGNANCY: Primary | ICD-10-CM

## 2022-12-12 LAB
ERYTHROCYTE [DISTWIDTH] IN BLOOD BY AUTOMATED COUNT: 12.3 % (ref 10–15)
GLUCOSE 1H P 50 G GLC PO SERPL-MCNC: 138 MG/DL (ref 70–129)
HCT VFR BLD AUTO: 33.2 % (ref 35–47)
HGB BLD-MCNC: 11 G/DL (ref 11.7–15.7)
MCH RBC QN AUTO: 31.9 PG (ref 26.5–33)
MCHC RBC AUTO-ENTMCNC: 33.1 G/DL (ref 31.5–36.5)
MCV RBC AUTO: 96 FL (ref 78–100)
PLATELET # BLD AUTO: 245 10E3/UL (ref 150–450)
RBC # BLD AUTO: 3.45 10E6/UL (ref 3.8–5.2)
WBC # BLD AUTO: 11.3 10E3/UL (ref 4–11)

## 2022-12-12 PROCEDURE — 36415 COLL VENOUS BLD VENIPUNCTURE: CPT | Performed by: OBSTETRICS & GYNECOLOGY

## 2022-12-12 PROCEDURE — 82950 GLUCOSE TEST: CPT | Performed by: OBSTETRICS & GYNECOLOGY

## 2022-12-12 PROCEDURE — 86780 TREPONEMA PALLIDUM: CPT | Performed by: OBSTETRICS & GYNECOLOGY

## 2022-12-12 PROCEDURE — 99207 PR PRENATAL VISIT: CPT | Performed by: OBSTETRICS & GYNECOLOGY

## 2022-12-12 PROCEDURE — 85027 COMPLETE CBC AUTOMATED: CPT | Performed by: OBSTETRICS & GYNECOLOGY

## 2022-12-12 NOTE — PROGRESS NOTES
"[facility]    Return OB Note    CC: Return OB     Subjective:  Franchesca is a 32 year old  at 24w5d   Denies vaginal bleeding, loss of fluid, or regular contractions. Good fetal movement.  Complaints today: None    Objective:  /82 (BP Location: Right arm, Patient Position: Chair, Cuff Size: Adult Regular)   Pulse 74   Temp 98.2  F (36.8  C) (Tympanic)   Resp 16   Ht 1.486 m (4' 10.5\")   Wt 83 kg (183 lb)   LMP 2022   BMI 37.60 kg/m      Fundal height: 26cm  FHT: 150bpm    Assessment/Plan:   Encounter Diagnosis   Name Primary?     Encounter for prenatal care in second trimester of first pregnancy Yes       IUP at 24w5d  -First OB labs normal except positive urine culture. Treated. Repeat urine improved.  -Genetic screening: NIPT low risk, normal quad screen  -Anatomy ultrasound WNL  -Mid trimester labs today  -Obesity: BMI 37: plan for growth US at 32 weeks, BPPs at 37 weeks  -Strict return precautions given    RTC 4 weeks    Avani Beatty DO"

## 2022-12-12 NOTE — NURSING NOTE
"Initial /82 (BP Location: Right arm, Patient Position: Chair, Cuff Size: Adult Regular)   Pulse 74   Temp 98.2  F (36.8  C) (Tympanic)   Resp 16   Ht 1.486 m (4' 10.5\")   Wt 83 kg (183 lb)   LMP 06/22/2022   BMI 37.60 kg/m   Estimated body mass index is 37.6 kg/m  as calculated from the following:    Height as of this encounter: 1.486 m (4' 10.5\").    Weight as of this encounter: 83 kg (183 lb). .      "

## 2022-12-13 LAB — T PALLIDUM AB SER QL: NONREACTIVE

## 2022-12-15 ENCOUNTER — NURSE TRIAGE (OUTPATIENT)
Dept: NURSING | Facility: CLINIC | Age: 32
End: 2022-12-15

## 2022-12-15 ENCOUNTER — TELEPHONE (OUTPATIENT)
Dept: OBGYN | Facility: CLINIC | Age: 32
End: 2022-12-15

## 2022-12-15 ENCOUNTER — OFFICE VISIT (OUTPATIENT)
Dept: URGENT CARE | Facility: URGENT CARE | Age: 32
End: 2022-12-15
Payer: COMMERCIAL

## 2022-12-15 ENCOUNTER — HOSPITAL ENCOUNTER (EMERGENCY)
Facility: CLINIC | Age: 32
Discharge: HOME | End: 2022-12-15
Attending: FAMILY MEDICINE | Admitting: FAMILY MEDICINE
Payer: COMMERCIAL

## 2022-12-15 VITALS
OXYGEN SATURATION: 96 % | BODY MASS INDEX: 37.29 KG/M2 | DIASTOLIC BLOOD PRESSURE: 67 MMHG | HEIGHT: 59 IN | WEIGHT: 185 LBS | HEART RATE: 74 BPM | RESPIRATION RATE: 18 BRPM | TEMPERATURE: 98.8 F | SYSTOLIC BLOOD PRESSURE: 110 MMHG

## 2022-12-15 VITALS
SYSTOLIC BLOOD PRESSURE: 116 MMHG | HEART RATE: 76 BPM | WEIGHT: 185 LBS | DIASTOLIC BLOOD PRESSURE: 79 MMHG | TEMPERATURE: 98.5 F | BODY MASS INDEX: 38.01 KG/M2 | OXYGEN SATURATION: 99 %

## 2022-12-15 DIAGNOSIS — N23 RENAL COLIC: ICD-10-CM

## 2022-12-15 DIAGNOSIS — N39.0 URINARY TRACT INFECTION: ICD-10-CM

## 2022-12-15 DIAGNOSIS — N20.0 NEPHROLITHIASIS: Primary | ICD-10-CM

## 2022-12-15 LAB
ALBUMIN SERPL BCG-MCNC: 3.6 G/DL (ref 3.5–5.2)
ALBUMIN UR-MCNC: ABNORMAL MG/DL
ALBUMIN UR-MCNC: ABNORMAL MG/DL
ALP SERPL-CCNC: 61 U/L (ref 35–104)
ALT SERPL W P-5'-P-CCNC: 9 U/L (ref 10–35)
ANION GAP SERPL CALCULATED.3IONS-SCNC: 16 MMOL/L (ref 7–15)
APPEARANCE UR: ABNORMAL
APPEARANCE UR: CLEAR
AST SERPL W P-5'-P-CCNC: 38 U/L (ref 10–35)
BACTERIA #/AREA URNS HPF: ABNORMAL /HPF
BACTERIA #/AREA URNS HPF: ABNORMAL /HPF
BASOPHILS # BLD AUTO: 0 10E3/UL (ref 0–0.2)
BASOPHILS NFR BLD AUTO: 0 %
BILIRUB SERPL-MCNC: 0.2 MG/DL
BILIRUB UR QL STRIP: NEGATIVE
BILIRUB UR QL STRIP: NEGATIVE
BUN SERPL-MCNC: 9.3 MG/DL (ref 6–20)
CALCIUM SERPL-MCNC: 9.2 MG/DL (ref 8.6–10)
CHLORIDE SERPL-SCNC: 101 MMOL/L (ref 98–107)
COLOR UR AUTO: ABNORMAL
COLOR UR AUTO: YELLOW
CREAT SERPL-MCNC: 0.63 MG/DL (ref 0.51–0.95)
DEPRECATED HCO3 PLAS-SCNC: 17 MMOL/L (ref 22–29)
EOSINOPHIL # BLD AUTO: 0 10E3/UL (ref 0–0.7)
EOSINOPHIL NFR BLD AUTO: 0 %
ERYTHROCYTE [DISTWIDTH] IN BLOOD BY AUTOMATED COUNT: 12.2 % (ref 10–15)
GFR SERPL CREATININE-BSD FRML MDRD: >90 ML/MIN/1.73M2
GLUCOSE SERPL-MCNC: 89 MG/DL (ref 70–99)
GLUCOSE UR STRIP-MCNC: NEGATIVE MG/DL
GLUCOSE UR STRIP-MCNC: NEGATIVE MG/DL
HCT VFR BLD AUTO: 30.8 % (ref 35–47)
HGB BLD-MCNC: 11 G/DL (ref 11.7–15.7)
HGB UR QL STRIP: ABNORMAL
HGB UR QL STRIP: ABNORMAL
IMM GRANULOCYTES # BLD: 0.2 10E3/UL
IMM GRANULOCYTES NFR BLD: 1 %
KETONES UR STRIP-MCNC: 15 MG/DL
KETONES UR STRIP-MCNC: NEGATIVE MG/DL
LEUKOCYTE ESTERASE UR QL STRIP: NEGATIVE
LEUKOCYTE ESTERASE UR QL STRIP: NEGATIVE
LYMPHOCYTES # BLD AUTO: 1.7 10E3/UL (ref 0.8–5.3)
LYMPHOCYTES NFR BLD AUTO: 9 %
MCH RBC QN AUTO: 32.2 PG (ref 26.5–33)
MCHC RBC AUTO-ENTMCNC: 35.7 G/DL (ref 31.5–36.5)
MCV RBC AUTO: 90 FL (ref 78–100)
MONOCYTES # BLD AUTO: 1 10E3/UL (ref 0–1.3)
MONOCYTES NFR BLD AUTO: 5 %
MUCOUS THREADS #/AREA URNS LPF: PRESENT /LPF
MUCOUS THREADS #/AREA URNS LPF: PRESENT /LPF
NEUTROPHILS # BLD AUTO: 15.6 10E3/UL (ref 1.6–8.3)
NEUTROPHILS NFR BLD AUTO: 85 %
NITRATE UR QL: NEGATIVE
NITRATE UR QL: NEGATIVE
NRBC # BLD AUTO: 0 10E3/UL
NRBC BLD AUTO-RTO: 0 /100
PH UR STRIP: 5 [PH] (ref 5–7)
PH UR STRIP: 5.5 [PH] (ref 5–7)
PLATELET # BLD AUTO: 243 10E3/UL (ref 150–450)
POTASSIUM SERPL-SCNC: 4.5 MMOL/L (ref 3.4–5.3)
PROT SERPL-MCNC: 7.1 G/DL (ref 6.4–8.3)
RBC # BLD AUTO: 3.42 10E6/UL (ref 3.8–5.2)
RBC #/AREA URNS AUTO: ABNORMAL /HPF
RBC URINE: 52 /HPF
SODIUM SERPL-SCNC: 134 MMOL/L (ref 136–145)
SP GR UR STRIP: 1.02 (ref 1–1.03)
SP GR UR STRIP: >=1.03 (ref 1–1.03)
SQUAMOUS #/AREA URNS AUTO: ABNORMAL /LPF
SQUAMOUS EPITHELIAL: 1 /HPF
UROBILINOGEN UR STRIP-ACNC: 0.2 E.U./DL
UROBILINOGEN UR STRIP-MCNC: NORMAL MG/DL
WBC # BLD AUTO: 18.6 10E3/UL (ref 4–11)
WBC #/AREA URNS AUTO: ABNORMAL /HPF
WBC URINE: 6 /HPF

## 2022-12-15 PROCEDURE — 80053 COMPREHEN METABOLIC PANEL: CPT | Performed by: FAMILY MEDICINE

## 2022-12-15 PROCEDURE — 99214 OFFICE O/P EST MOD 30 MIN: CPT

## 2022-12-15 PROCEDURE — 36415 COLL VENOUS BLD VENIPUNCTURE: CPT | Performed by: FAMILY MEDICINE

## 2022-12-15 PROCEDURE — 99285 EMERGENCY DEPT VISIT HI MDM: CPT | Mod: 25 | Performed by: FAMILY MEDICINE

## 2022-12-15 PROCEDURE — 96376 TX/PRO/DX INJ SAME DRUG ADON: CPT | Performed by: FAMILY MEDICINE

## 2022-12-15 PROCEDURE — 96365 THER/PROPH/DIAG IV INF INIT: CPT | Performed by: FAMILY MEDICINE

## 2022-12-15 PROCEDURE — 81001 URINALYSIS AUTO W/SCOPE: CPT | Performed by: FAMILY MEDICINE

## 2022-12-15 PROCEDURE — 85025 COMPLETE CBC W/AUTO DIFF WBC: CPT | Performed by: FAMILY MEDICINE

## 2022-12-15 PROCEDURE — 250N000011 HC RX IP 250 OP 636: Performed by: FAMILY MEDICINE

## 2022-12-15 PROCEDURE — 258N000003 HC RX IP 258 OP 636: Performed by: FAMILY MEDICINE

## 2022-12-15 PROCEDURE — 81001 URINALYSIS AUTO W/SCOPE: CPT | Performed by: EMERGENCY MEDICINE

## 2022-12-15 PROCEDURE — 96366 THER/PROPH/DIAG IV INF ADDON: CPT | Performed by: FAMILY MEDICINE

## 2022-12-15 PROCEDURE — 99285 EMERGENCY DEPT VISIT HI MDM: CPT | Performed by: FAMILY MEDICINE

## 2022-12-15 PROCEDURE — 96375 TX/PRO/DX INJ NEW DRUG ADDON: CPT | Performed by: FAMILY MEDICINE

## 2022-12-15 PROCEDURE — 87086 URINE CULTURE/COLONY COUNT: CPT | Performed by: STUDENT IN AN ORGANIZED HEALTH CARE EDUCATION/TRAINING PROGRAM

## 2022-12-15 PROCEDURE — 81001 URINALYSIS AUTO W/SCOPE: CPT

## 2022-12-15 RX ORDER — OXYCODONE AND ACETAMINOPHEN 5; 325 MG/1; MG/1
1 TABLET ORAL EVERY 6 HOURS PRN
Qty: 12 TABLET | Refills: 0 | Status: ON HOLD | OUTPATIENT
Start: 2022-12-15 | End: 2023-03-27

## 2022-12-15 RX ORDER — ONDANSETRON 2 MG/ML
4 INJECTION INTRAMUSCULAR; INTRAVENOUS ONCE
Status: DISCONTINUED | OUTPATIENT
Start: 2022-12-15 | End: 2022-12-16 | Stop reason: HOSPADM

## 2022-12-15 RX ORDER — ONDANSETRON 2 MG/ML
4 INJECTION INTRAMUSCULAR; INTRAVENOUS ONCE
Status: COMPLETED | OUTPATIENT
Start: 2022-12-15 | End: 2022-12-15

## 2022-12-15 RX ORDER — HYDROMORPHONE HYDROCHLORIDE 1 MG/ML
0.5 INJECTION, SOLUTION INTRAMUSCULAR; INTRAVENOUS; SUBCUTANEOUS
Status: DISCONTINUED | OUTPATIENT
Start: 2022-12-15 | End: 2022-12-16 | Stop reason: HOSPADM

## 2022-12-15 RX ORDER — ONDANSETRON 4 MG/1
4 TABLET, ORALLY DISINTEGRATING ORAL EVERY 8 HOURS PRN
Qty: 10 TABLET | Refills: 0 | Status: ON HOLD | OUTPATIENT
Start: 2022-12-15 | End: 2023-03-27

## 2022-12-15 RX ADMIN — HYDROMORPHONE HYDROCHLORIDE 0.5 MG: 1 INJECTION, SOLUTION INTRAMUSCULAR; INTRAVENOUS; SUBCUTANEOUS at 18:53

## 2022-12-15 RX ADMIN — ONDANSETRON 4 MG: 2 INJECTION INTRAMUSCULAR; INTRAVENOUS at 19:36

## 2022-12-15 RX ADMIN — HYDROMORPHONE HYDROCHLORIDE 0.5 MG: 1 INJECTION, SOLUTION INTRAMUSCULAR; INTRAVENOUS; SUBCUTANEOUS at 19:49

## 2022-12-15 RX ADMIN — DEXTROSE AND SODIUM CHLORIDE: 5; 900 INJECTION, SOLUTION INTRAVENOUS at 18:53

## 2022-12-15 ASSESSMENT — ACTIVITIES OF DAILY LIVING (ADL)
ADLS_ACUITY_SCORE: 35
ADLS_ACUITY_SCORE: 35

## 2022-12-15 NOTE — ED NOTES
Pt reassessed, pain unchanged since arrival to ED. Pt has own bottle of APAP she may take more if she needs, states didn't help before when she took it at 1200. Pt up to BR to attempt UA. Pt denies feelings of contractions. States pain is constant in nature, in right flank. Denies bleeding or discharge.

## 2022-12-15 NOTE — ED TRIAGE NOTES
Patient reports hx of kidney stones. This morning around 0600 patient started having severe right flank pain. Took tylenol without relief. Patient is 25 weeks pregnant. Reports feeling baby move, denies any leaking of fluid, abnormal discharge.     Triage Assessment     Row Name 12/15/22 1527       Triage Assessment (Adult)    Airway WDL WDL       Respiratory WDL    Respiratory WDL WDL       Skin Circulation/Temperature WDL    Skin Circulation/Temperature WDL WDL       Cardiac WDL    Cardiac WDL WDL       Peripheral/Neurovascular WDL    Peripheral Neurovascular WDL WDL       Cognitive/Neuro/Behavioral WDL    Cognitive/Neuro/Behavioral WDL WDL

## 2022-12-15 NOTE — TELEPHONE ENCOUNTER
Triage Call:     Pt calling to report the followin weeks pregnant with a hx of kidney stones in the past. She has oxycodone at home, but is wondering if she should be seen for this pain.     Pain started a couple hours ago; feels like kidney stones as in the past  Lower back on the right side  Feeling nauseated  Pain: 6-7/10  No fever    Hx of kidney stones    Disposition: See in office today. Pt was given the care advice and was transferred to scheduling to check appt availability. She is aware of the nearby [facility] if there are no appts.     Janie Zarate RN  [facility] Nurse Advisor 8:56 AM 12/15/2022          Reason for Disposition    MODERATE pain (e.g., interferes with normal activities or awakens from sleep)    Additional Information    Negative: Passed out (i.e., lost consciousness, collapsed and was not responding)    Negative: Shock suspected (e.g., cold/pale/clammy skin, too weak to stand, low BP, rapid pulse)    Negative: Sounds like a life-threatening emergency to the triager    Negative: Followed a major injury to the back (e.g., MVA, fall > 10 feet or 3 meters, penetrating injury, etc.)    Negative: Upper, mid or lower back pain that occurs mainly in the midline    Negative: SEVERE pain (e.g., excruciating, scale 8-10) and present > 1 hour    Negative: Sudden onset of severe flank pain and age > 60 years    Negative: Abdominal pain and age > 60 years    Negative: Unable to urinate (or only a few drops) > 4 hours and bladder feels very full (e.g., palpable bladder or strong urge to urinate)    Negative: Pain radiates into groin, scrotum    Negative: Blood in urine (red, pink, or tea-colored)    Negative: Vomiting    Negative: Weakness of a leg or foot (e.g., unable to bear weight, dragging foot)    Negative: Patient sounds very sick or weak to the triager    Negative: Pain or burning with passing urine (urination)    Negative: Fever > 100.4 F (38.0 C)    Protocols used: FLANK PAIN-A-OH

## 2022-12-15 NOTE — TELEPHONE ENCOUNTER
Patient calling clinic today stating she was evaluated in Urgent Care today for kidney stone.  Patient reports pain had improved some with Tylenol and ice.   Now pain has returned and is worse than it ever has been.  Icing is not helping and Tylenol is not either.    Reviewed Urgent Care notes.       Educated patient to monitor for worsening condition such as: intense pain, new flank pain, fevers, blood clots in the urine, decreased urine output and foul smelling urine, encouraged them to present to the ED if they notice any of these signs.    Patient will go to ER for further evaluation since pain is worsening and uncontrolled.  Patient is agreeable and understanding of this plan.    Xiao Kendall   Ob/Gyn Clinic  RN

## 2022-12-15 NOTE — PROGRESS NOTES
URGENT CARE  Assessment & Plan   Assessment:   Franchesca Brewer is a 32 year old female who's clinical presentation today is consistent with:   1. Nephrolithiasis  - UA macro with reflex to Microscopic and Culture - Clinc Collect  - Urine Microscopic    No alarm signs or symptoms present   Differential Diagnoses for this patient's CC include    Bacterial vaginosis, candidiasis, Vulvovaginitis,   PID, UTI, Vaginitis (inflammatory, irritative),    Plan:  Urine analysis did not show any WBCs suggesting a UTI, but did have gross hematuria consistent with possible nephrolithiasis, additionally KUB xray is unable to be obtained as patient is 25 weeks pregnant, as such I am suspicious for nephrolithiasis and will treat patient for assumed} kidney stone today. Discussed with the patient that we do not have ultrasound here in urgent care for definite diagnosis and it is recommend they present to ED for stone confirmation and sizing at this time patient is refusing and thus educated patient that most stones will pass on their own (if <5mm), but that they need to follow up w/ their PCP in the next 2-3 days to be re-evaluated regarding stone expulsion.   Encouraged pain control today with tylenol, discussed renal colic and the waxing and weaning of pain, also discussed possibility of ureteral stone getting stuck and not passing, and that this would present as severe pain lasting >2-3 hours and decreased urine output   Discussed w/ patient the importance of getting plenty of fluids and staying active to help the stone pass.   Educated patient to monitor for worsening condition such as: intense pain, new flank pain, fevers, blood clots in the urine, decreased urine output and foul smelling urine, encouraged them to present to the ED if they notice any of these signs.    Patient  is  agreeable to treatment plan and state they will follow-up if symptoms do not improve and/or if symptoms worsen (see patient's AVS 'monitor for'  section for specific patient instructions given and discussed regarding what to watch for and when to follow up)    Medications ordered are listed above, please see AVS for patient's specific and personalized discharge instructions given     NEFTALI [facility] CARE [place]      ______________________________________________________________________        Subjective  Subjective     HPI: Franchesca Brewer  is a 32 year old  female who presents today for evaluation the following concerns:   The patient presents today complaining of right sided flank pain for 1 days which started on 12/14/22  Patient states the pain waxes and wanes, comes and goes and can be so severe she vomits at times.   Patient denies any bouts of nausea and vomiting.   Patient also endorses she has noticed some blood in her urine     Patient denies urinary symptoms of: burning, pelvic pain, and sensation of incomplete bladder emptying or frequency   Patient endorses having a past history of one previous kidney stone  Patient denies fever, chills, or any abnormal vaginal discharge/odor or bleeding.      No Known Allergies  Patient Active Problem List   Diagnosis     Female infertility     Prenatal care, first pregnancy       Review of Systems:  Pertinent review of systems as reflected in HPI, otherwise negative.     Objective  Objective    Physical Exam:  Vitals:    12/15/22 1140   BP: 116/79   Pulse: 76   Temp: 98.5  F (36.9  C)   TempSrc: Tympanic   SpO2: 99%   Weight: 83.9 kg (185 lb)      General: Alert and oriented, no acute distress, afebrile, normotensive  Psy/mental status: Nonanxious, cooperative  SKIN: Intact, no open areas  ABDOMEN:  soft, non-tender, non-distended    No flank pain or CVA tenderness to palpation bilaterally  Pelvic/ :   Deferred per patient       LABS:   Results for orders placed or performed in visit on 12/15/22   UA macro with reflex to Microscopic and Culture - Clinc Collect      Status: Abnormal    Specimen: Urine, Clean Catch   Result Value Ref Range    Color Urine Yellow Colorless, Straw, Light Yellow, Yellow    Appearance Urine Cloudy (A) Clear    Glucose Urine Negative Negative mg/dL    Bilirubin Urine Negative Negative    Ketones Urine Negative Negative mg/dL    Specific Gravity Urine >=1.030 1.003 - 1.035    Blood Urine Large (A) Negative    pH Urine 5.5 5.0 - 7.0    Protein Albumin Urine Trace (A) Negative mg/dL    Urobilinogen Urine 0.2 0.2, 1.0 E.U./dL    Nitrite Urine Negative Negative    Leukocyte Esterase Urine Negative Negative   Urine Microscopic     Status: Abnormal   Result Value Ref Range    Bacteria Urine Many (A) None Seen /HPF    RBC Urine  (A) 0-2 /HPF /HPF    WBC Urine 0-5 0-5 /HPF /HPF    Squamous Epithelials Urine Many (A) None Seen /LPF    Mucus Urine Present (A) None Seen /LPF    Narrative    Urine Culture not indicated       I explained my diagnostic considerations and recommendations to the patient, who voiced understanding and agreement with the treatment plan.   All questions were answered.   We discussed potential side effects, risks and benefits of any prescribed or recommended therapies, as well as expectations for response to treatments.  Please see AVS for any patient instructions & handouts given.   Patient was advised to contact the Nurse Care Line, their Primary Care provider, Urgent Care, or the Emergency Department if there are new or worsening symptoms, or call 911 for emergencies.        ______________________________________________________________________          Patient Instructions   Diagnosis: kidney stone _ nephrolithiasis     Plan:     Pain control: tylenol      Follow up with PCp if able     Most stones will pass on their own     Concern with they are too big to pass and can become stuck in ureter - Emergency Room     Monitor for severe pain    Decreased urine output     Drink plenty of fluids. This means at least 12, 8-ounce glasses  of fluid--mostly water--a day.    Try to stay as active as possible. This will help the stone pass.     Don't stay in bed unless your pain keeps you from getting up.     You may notice a red, pink, or brown color to your urine. This is normal while passing a kidney stone.    Need to follow up with PCP in 24-48 hours to reassess if stone is passing, vs renal speciality     Also discuss prevention of stone formation in the future     Monitor for:     Weakness, dizziness, or fainting    Intense pain that does not go away     Repeated vomiting or unable to keep down fluids    Fever of 101 F    Passage of solid red or brown urine (can't see through it) or urine with lots of blood clots    Foul-smelling or cloudy urine    Unable to pass urine for 8 hours and increasing bladder pressure        Kidney Stones Nephrolithiasis   The sharp cramping pain on either side of your lower back and nausea or vomiting that you have are because of a small stone that has formed in the kidney.   It's now passing down a narrow tube (ureter) on its way to your bladder.   Once the stone reaches your bladder, the pain will often stop.   But it may come back as the stone continues to pass out of the bladder and through the urethra.   The stone may pass in your urine stream in one piece.   The size may be 1/16 inch to 1/4 inch (1 mm to 6 mm). Or, the stone may break up into kayy fragments that you may not even notice.  Once you have had a kidney stone, you are at risk of getting another one in the future.   There are 4 types of kidney stones. 80% percent are calcium stones--mostly calcium oxalate but also some with calcium phosphate.   The other 3 types include uric acid stones, struvite stones (from a preceding infection), and rarely, cystine stones.  Most stones will pass on their own, but may take from a few hours to a few days.   Sometimes the stone is too large to pass by itself. In that case, the healthcare provider will need to use other  ways to remove the stone.

## 2022-12-15 NOTE — PATIENT INSTRUCTIONS
Diagnosis: kidney stone _ nephrolithiasis     Plan:   Pain control: tylenol    Follow up with PCp if able   Most stones will pass on their own   Concern with they are too big to pass and can become stuck in ureter - Emergency Room   Monitor for severe pain  Decreased urine output   Drink plenty of fluids. This means at least 12, 8-ounce glasses of fluid--mostly water--a day.  Try to stay as active as possible. This will help the stone pass.   Don't stay in bed unless your pain keeps you from getting up.   You may notice a red, pink, or brown color to your urine. This is normal while passing a kidney stone.  Need to follow up with PCP in 24-48 hours to reassess if stone is passing, vs renal speciality   Also discuss prevention of stone formation in the future     Monitor for:   Weakness, dizziness, or fainting  Intense pain that does not go away   Repeated vomiting or unable to keep down fluids  Fever of 101 F  Passage of solid red or brown urine (can't see through it) or urine with lots of blood clots  Foul-smelling or cloudy urine  Unable to pass urine for 8 hours and increasing bladder pressure        Kidney Stones Nephrolithiasis   The sharp cramping pain on either side of your lower back and nausea or vomiting that you have are because of a small stone that has formed in the kidney.   It's now passing down a narrow tube (ureter) on its way to your bladder.   Once the stone reaches your bladder, the pain will often stop.   But it may come back as the stone continues to pass out of the bladder and through the urethra.   The stone may pass in your urine stream in one piece.   The size may be 1/16 inch to 1/4 inch (1 mm to 6 mm). Or, the stone may break up into kayy fragments that you may not even notice.  Once you have had a kidney stone, you are at risk of getting another one in the future.   There are 4 types of kidney stones. 80% percent are calcium stones--mostly calcium oxalate but also some with calcium  phosphate.   The other 3 types include uric acid stones, struvite stones (from a preceding infection), and rarely, cystine stones.  Most stones will pass on their own, but may take from a few hours to a few days.   Sometimes the stone is too large to pass by itself. In that case, the healthcare provider will need to use other ways to remove the stone.

## 2022-12-16 ENCOUNTER — PATIENT OUTREACH (OUTPATIENT)
Dept: UROLOGY | Facility: CLINIC | Age: 32
End: 2022-12-16

## 2022-12-16 ENCOUNTER — APPOINTMENT (OUTPATIENT)
Dept: ULTRASOUND IMAGING | Facility: CLINIC | Age: 32
End: 2022-12-16
Attending: EMERGENCY MEDICINE
Payer: COMMERCIAL

## 2022-12-16 ENCOUNTER — HOSPITAL ENCOUNTER (EMERGENCY)
Facility: CLINIC | Age: 32
Discharge: HOME | End: 2022-12-16
Attending: EMERGENCY MEDICINE | Admitting: EMERGENCY MEDICINE
Payer: COMMERCIAL

## 2022-12-16 VITALS
WEIGHT: 185 LBS | BODY MASS INDEX: 37.29 KG/M2 | HEART RATE: 82 BPM | SYSTOLIC BLOOD PRESSURE: 112 MMHG | HEIGHT: 59 IN | DIASTOLIC BLOOD PRESSURE: 69 MMHG | RESPIRATION RATE: 18 BRPM | OXYGEN SATURATION: 98 %

## 2022-12-16 DIAGNOSIS — R10.9 RIGHT FLANK PAIN: ICD-10-CM

## 2022-12-16 DIAGNOSIS — N23 RENAL COLIC: ICD-10-CM

## 2022-12-16 DIAGNOSIS — N39.0 URINARY TRACT INFECTION: Primary | ICD-10-CM

## 2022-12-16 PROCEDURE — 96361 HYDRATE IV INFUSION ADD-ON: CPT

## 2022-12-16 PROCEDURE — 96375 TX/PRO/DX INJ NEW DRUG ADDON: CPT

## 2022-12-16 PROCEDURE — 250N000011 HC RX IP 250 OP 636: Performed by: EMERGENCY MEDICINE

## 2022-12-16 PROCEDURE — 76770 US EXAM ABDO BACK WALL COMP: CPT

## 2022-12-16 PROCEDURE — 96374 THER/PROPH/DIAG INJ IV PUSH: CPT

## 2022-12-16 PROCEDURE — 250N000013 HC RX MED GY IP 250 OP 250 PS 637: Performed by: EMERGENCY MEDICINE

## 2022-12-16 PROCEDURE — 99285 EMERGENCY DEPT VISIT HI MDM: CPT | Performed by: EMERGENCY MEDICINE

## 2022-12-16 PROCEDURE — 99285 EMERGENCY DEPT VISIT HI MDM: CPT | Mod: 25

## 2022-12-16 PROCEDURE — 96376 TX/PRO/DX INJ SAME DRUG ADON: CPT

## 2022-12-16 PROCEDURE — 258N000003 HC RX IP 258 OP 636: Performed by: EMERGENCY MEDICINE

## 2022-12-16 RX ORDER — ONDANSETRON 2 MG/ML
4 INJECTION INTRAMUSCULAR; INTRAVENOUS EVERY 30 MIN PRN
Status: DISCONTINUED | OUTPATIENT
Start: 2022-12-16 | End: 2022-12-16 | Stop reason: HOSPADM

## 2022-12-16 RX ORDER — SODIUM CHLORIDE, SODIUM LACTATE, POTASSIUM CHLORIDE, CALCIUM CHLORIDE 600; 310; 30; 20 MG/100ML; MG/100ML; MG/100ML; MG/100ML
125 INJECTION, SOLUTION INTRAVENOUS CONTINUOUS
Status: DISCONTINUED | OUTPATIENT
Start: 2022-12-16 | End: 2022-12-16 | Stop reason: HOSPADM

## 2022-12-16 RX ORDER — HYDROMORPHONE HYDROCHLORIDE 1 MG/ML
0.5 INJECTION, SOLUTION INTRAMUSCULAR; INTRAVENOUS; SUBCUTANEOUS
Status: COMPLETED | OUTPATIENT
Start: 2022-12-16 | End: 2022-12-16

## 2022-12-16 RX ORDER — ACETAMINOPHEN 325 MG/1
975 TABLET ORAL ONCE
Status: COMPLETED | OUTPATIENT
Start: 2022-12-16 | End: 2022-12-16

## 2022-12-16 RX ADMIN — ONDANSETRON 4 MG: 2 INJECTION INTRAMUSCULAR; INTRAVENOUS at 05:18

## 2022-12-16 RX ADMIN — PROCHLORPERAZINE EDISYLATE 10 MG: 5 INJECTION INTRAMUSCULAR; INTRAVENOUS at 11:34

## 2022-12-16 RX ADMIN — HYDROMORPHONE HYDROCHLORIDE 0.5 MG: 1 INJECTION, SOLUTION INTRAMUSCULAR; INTRAVENOUS; SUBCUTANEOUS at 05:38

## 2022-12-16 RX ADMIN — SODIUM CHLORIDE, POTASSIUM CHLORIDE, SODIUM LACTATE AND CALCIUM CHLORIDE 125 ML/HR: 600; 310; 30; 20 INJECTION, SOLUTION INTRAVENOUS at 08:18

## 2022-12-16 RX ADMIN — SODIUM CHLORIDE, POTASSIUM CHLORIDE, SODIUM LACTATE AND CALCIUM CHLORIDE 1000 ML: 600; 310; 30; 20 INJECTION, SOLUTION INTRAVENOUS at 05:16

## 2022-12-16 RX ADMIN — HYDROMORPHONE HYDROCHLORIDE 0.5 MG: 1 INJECTION, SOLUTION INTRAMUSCULAR; INTRAVENOUS; SUBCUTANEOUS at 05:19

## 2022-12-16 RX ADMIN — HYDROMORPHONE HYDROCHLORIDE 0.5 MG: 1 INJECTION, SOLUTION INTRAMUSCULAR; INTRAVENOUS; SUBCUTANEOUS at 08:18

## 2022-12-16 RX ADMIN — ACETAMINOPHEN 975 MG: 325 TABLET, FILM COATED ORAL at 05:19

## 2022-12-16 ASSESSMENT — ACTIVITIES OF DAILY LIVING (ADL)
ADLS_ACUITY_SCORE: 35

## 2022-12-16 ASSESSMENT — ENCOUNTER SYMPTOMS
FLANK PAIN: 1
ABDOMINAL PAIN: 0
SHORTNESS OF BREATH: 0
FEVER: 0

## 2022-12-16 NOTE — DISCHARGE INSTRUCTIONS
As we discussed based upon your previous abdominal CT in February and your symptoms at presentation here today I think you most likely due to or did have a right-sided ureteral stone.  Hopefully you have passed this.  If not you will likely need to use pain medication.  Tylenol is reasonable at baseline.  If this is not sufficient use the oxycodone prescribed for breakthrough pain.  You may also use Zofran for nausea.  Vigorous hydration, drinking lots of liquids, is crucial to passing kidney stones and also reducing the likelihood of further formation.  If you have recurrent pain that is not manageable easily with the medicines prescribed and over-the-counter you should return to the emergency department or follow-up in clinic with your OB/GYN.  As we discussed I have low suspicion that there is an alternate explanation for your pain such as acute appendicitis here today.

## 2022-12-16 NOTE — ED PROVIDER NOTES
History     Chief Complaint   Patient presents with     Flank Pain     HPI  Franchesca Brewer is a 32 year old female who is  at approximately 25 weeks gestation, with history of prior kidney stones, presenting the emergency department with concerns regarding right-sided flank pain.  Patient had just been discharged home from the emergency department yesterday evening.  She had the onset of pain around 6 AM yesterday morning, approximately 24 hours ago.  Pain is similar to prior episodes of kidney stones.  Patient had urinalysis showing blood in the urine, and findings and symptoms consistent with kidney stones.  Had been treated with IV and oral analgesics, discharged home.  Patient was able to sleep for approximately 1 hour, and awoke with severe worsening and ongoing right-sided flank pain with some radiation towards the groin.  Nausea without vomiting.  No left-sided pains.  No vaginal bleeding.    Allergies:  No Known Allergies    Problem List:    Patient Active Problem List    Diagnosis Date Noted     Prenatal care, first pregnancy 2022     Priority: Medium     Female infertility 2022     Priority: Medium        Past Medical History:    No past medical history on file.    Past Surgical History:    Past Surgical History:   Procedure Laterality Date     MYRINGOTOMY, INSERT TUBE BILATERAL, COMBINED       AK REMOVE TONSILS/ADENOIDS,<13 Y/O      age 9       Family History:    Family History   Problem Relation Age of Onset     Crohn's Disease Father        Social History:  Marital Status:   [2]  Social History     Tobacco Use     Smoking status: Former     Packs/day: 0.50     Years: 7.00     Pack years: 3.50     Types: Cigarettes     Quit date: 2017     Years since quittin.5     Smokeless tobacco: Never   Vaping Use     Vaping Use: Never used   Substance Use Topics     Alcohol use: Not Currently     Drug use: Never        Medications:    Calcium Carbonate Antacid (TUMS  PO)  ondansetron (ZOFRAN ODT) 4 MG ODT tab  oxyCODONE-acetaminophen (PERCOCET) 5-325 MG tablet  Prenatal Vit-Fe Fumarate-FA (PRENATAL MULTIVITAMIN W/IRON) 27-0.8 MG tablet          Review of Systems   Constitutional: Negative for fever.   Respiratory: Negative for shortness of breath.    Cardiovascular: Negative for chest pain.   Gastrointestinal: Negative for abdominal pain.   Genitourinary: Positive for flank pain.   All other systems reviewed and are negative.      Physical Exam   BP: 125/80  Pulse: 78  Resp: 18  SpO2: 100 %      Physical Exam  /80   Pulse 78   Resp 18   LMP 06/22/2022   SpO2 100%   /80   Pulse 78   Resp 18   LMP 06/22/2022   SpO2 100%   General: alert, interactive, in moderate distress appearing in pain  Head: atraumatic  Nose: no rhinorrhea or epistaxis  Ears: no external auditory canal discharge or bleeding.    Eyes: Sclera nonicteric. Conjunctiva noninjected. PERRL, EOMI  Mouth: no tonsillar erythema, edema, or exudate  Neck: supple, no palp LAD  Lungs: CTAB  CV: RRR, S1/S2; peripheral pulses palpable and symmetric  Abdomen: soft, nt, nd, no guarding or rebound. Positive bowel sounds  Extremities: no cyanosis or edema  Skin: no rash or diaphoresis  Neuro:  strength 5/5 in UE and LEs bilaterally, sensation intact to light touch in UE and LEs bilaterally;           ED Course                 Procedures              Critical Care time:  none               Results for orders placed or performed during the hospital encounter of 12/15/22 (from the past 24 hour(s))   UA with Microscopic reflex to Culture    Specimen: Urine, Midstream   Result Value Ref Range    Color Urine Straw Colorless, Straw, Light Yellow, Yellow    Appearance Urine Clear Clear    Glucose Urine Negative Negative mg/dL    Bilirubin Urine Negative Negative    Ketones Urine 15 (A) Negative mg/dL    Specific Gravity Urine 1.020 1.003 - 1.035    Blood Urine Large (A) Negative    pH Urine 5.0 5.0 - 7.0    Protein  Albumin Urine Trace (A) Negative mg/dL    Urobilinogen Urine Normal Normal, 2.0 mg/dL    Nitrite Urine Negative Negative    Leukocyte Esterase Urine Negative Negative    Bacteria Urine Few (A) None Seen /HPF    Mucus Urine Present (A) None Seen /LPF    RBC Urine 52 (H) <=2 /HPF    WBC Urine 6 (H) <=5 /HPF    Squamous Epithelials Urine 1 <=1 /HPF    Narrative    Urine Culture not indicated   CBC with platelets, differential    Narrative    The following orders were created for panel order CBC with platelets, differential.  Procedure                               Abnormality         Status                     ---------                               -----------         ------                     CBC with platelets and d...[[id number]]  Abnormal            Final result                 Please view results for these tests on the individual orders.   Comprehensive metabolic panel   Result Value Ref Range    Sodium 134 (L) 136 - 145 mmol/L    Potassium 4.5 3.4 - 5.3 mmol/L    Chloride 101 98 - 107 mmol/L    Carbon Dioxide (CO2) 17 (L) 22 - 29 mmol/L    Anion Gap 16 (H) 7 - 15 mmol/L    Urea Nitrogen 9.3 6.0 - 20.0 mg/dL    Creatinine 0.63 0.51 - 0.95 mg/dL    Calcium 9.2 8.6 - 10.0 mg/dL    Glucose 89 70 - 99 mg/dL    Alkaline Phosphatase 61 35 - 104 U/L    AST 38 (H) 10 - 35 U/L    ALT 9 (L) 10 - 35 U/L    Protein Total 7.1 6.4 - 8.3 g/dL    Albumin 3.6 3.5 - 5.2 g/dL    Bilirubin Total 0.2 <=1.2 mg/dL    GFR Estimate >90 >60 mL/min/1.73m2   CBC with platelets and differential   Result Value Ref Range    WBC Count 18.6 (H) 4.0 - 11.0 10e3/uL    RBC Count 3.42 (L) 3.80 - 5.20 10e6/uL    Hemoglobin 11.0 (L) 11.7 - 15.7 g/dL    Hematocrit 30.8 (L) 35.0 - 47.0 %    MCV 90 78 - 100 fL    MCH 32.2 26.5 - 33.0 pg    MCHC 35.7 31.5 - 36.5 g/dL    RDW 12.2 10.0 - 15.0 %    Platelet Count 243 150 - 450 10e3/uL    % Neutrophils 85 %    % Lymphocytes 9 %    % Monocytes 5 %    % Eosinophils 0 %    % Basophils 0 %    % Immature  Granulocytes 1 %    NRBCs per 100 WBC 0 <1 /100    Absolute Neutrophils 15.6 (H) 1.6 - 8.3 10e3/uL    Absolute Lymphocytes 1.7 0.8 - 5.3 10e3/uL    Absolute Monocytes 1.0 0.0 - 1.3 10e3/uL    Absolute Eosinophils 0.0 0.0 - 0.7 10e3/uL    Absolute Basophils 0.0 0.0 - 0.2 10e3/uL    Absolute Immature Granulocytes 0.2 <=0.4 10e3/uL    Absolute NRBCs 0.0 10e3/uL       Medications   lactated ringers BOLUS 1,000 mL (has no administration in time range)     Followed by   lactated ringers infusion (has no administration in time range)   ondansetron (ZOFRAN) injection 4 mg (has no administration in time range)   acetaminophen (TYLENOL) tablet 975 mg (has no administration in time range)   HYDROmorphone (PF) (DILAUDID) injection 0.5 mg (has no administration in time range)       Assessments & Plan (with Medical Decision Making)  32 year old female presenting to the emergency department with ongoing right-sided flank pains.  Symptoms present over the past 24 hours.  Patient was seen in the emergency department yesterday, with labs consistent with kidney stone.  Patient had pain which was tolerated at time of discharge, and now presents with ongoing, and worsening pain.  Her history and exam is consistent with likely ureteral stone with renal colic.  Patient will be given IV fluids, in addition to Zofran, and analgesics.  She will be monitored for some time in the emergency department, with determination of whether patient can be safely discharged home with symptomatic treatment versus hospital observation for pain control.  Signed out to oncoming daytime provider pending repeat assessment.     I have reviewed the nursing notes.    I have reviewed the findings, diagnosis, plan and need for follow up with the patient.       New Prescriptions    No medications on file       Final diagnoses:   Right flank pain   Renal colic       12/16/2022   [facility] EMERGENCY DEPT     Earle Arita MD  12/16/22 0546

## 2022-12-16 NOTE — PROGRESS NOTES
Added UC order on to UA/micro from ED    Pt scheudled 12 /20 virtual with Dr. Olson    Pt sent [employer] message with this info

## 2022-12-16 NOTE — ED TRIAGE NOTES
Pt seen earlier this evening and diagnosed with kidney stones. Has tried home percocet and zofran without relief of pain at midnight. Pt is 25 weeks pregnant.     Triage Assessment     Row Name 12/16/22 0454       Triage Assessment (Adult)    Airway WDL WDL       Respiratory WDL    Respiratory WDL WDL       Skin Circulation/Temperature WDL    Skin Circulation/Temperature WDL WDL       Cardiac WDL    Cardiac WDL WDL       Peripheral/Neurovascular WDL    Peripheral Neurovascular WDL WDL       Cognitive/Neuro/Behavioral WDL    Cognitive/Neuro/Behavioral WDL WDL

## 2022-12-16 NOTE — ED PROVIDER NOTES
Emergency Department Patient Sign-out       Brief HPI:  This is a 32 year old female signed out to me by Dr. Arita .  See initial ED Provider note for details of the presentation.     Dr Arita  HPI  Franchesca Brewer is a 32 year old female who is  at approximately 25 weeks gestation, with history of prior kidney stones, presenting the emergency department with concerns regarding right-sided flank pain.  Patient had just been discharged home from the emergency department yesterday evening.  She had the onset of pain around 6 AM yesterday morning, approximately 24 hours ago.  Pain is similar to prior episodes of kidney stones.  Patient had urinalysis showing blood in the urine, and findings and symptoms consistent with kidney stones.  Had been treated with IV and oral analgesics, discharged home.  Patient was able to sleep for approximately 1 hour, and awoke with severe worsening and ongoing right-sided flank pain with some radiation towards the groin.  Nausea without vomiting.  No left-sided pains.  No vaginal bleeding    Patient is evaluated 1115    She just had emesis upon my arrival to the room.  She describes her right flank discomfort as 4/10 aching.  She declines repeat pain med at this time.  Compazine ordered, renal ultrasound ordered, continue to monitor.    Patient reevaluated at 1400, feeling well, tolerating oral solid and fluid, no nausea, pain is resolved at this time.  Renal ultrasound significant for hydronephrosis on the right.  Patient feels well enough for discharge to home.  Will place urology stone referral.  She has Percocet and ondansetron.  Push fluids.  Return criteria reviewed      Exam:   Patient Vitals for the past 24 hrs:   BP Pulse Resp SpO2 Height Weight   22 1330 105/67 71 -- 99 % -- --   22 1327 112/73 74 -- 99 % -- --   22 1315 -- -- -- 97 % -- --   22 1245 -- -- -- 97 % -- --   22 1230 96/62 69 -- 96 % -- --   22 1215 -- -- -- 96 %  "-- --   12/16/22 1200 95/58 77 -- 96 % -- --   12/16/22 1145 -- -- -- 97 % -- --   12/16/22 1130 95/62 65 -- -- -- --   12/16/22 1100 99/65 69 -- -- -- --   12/16/22 1045 -- -- -- 100 % -- --   12/16/22 1030 116/77 75 -- 99 % -- --   12/16/22 1000 -- -- -- 97 % -- --   12/16/22 0945 -- -- -- 98 % -- --   12/16/22 0900 105/63 58 -- 95 % -- --   12/16/22 0830 122/72 89 -- 97 % -- --   12/16/22 0821 -- -- -- -- 1.499 m (4' 11\") 83.9 kg (185 lb)   12/16/22 0820 103/70 74 -- 98 % -- --   12/16/22 0530 106/66 69 -- 96 % -- --   12/16/22 0454 125/80 78 18 100 % -- --           ED RESULTS:   Results for orders placed or performed during the hospital encounter of 12/16/22 (from the past 24 hour(s))   US Renal Complete Non-Vascular     Status: None    Collection Time: 12/16/22  8:15 AM    Narrative    US RENAL COMPLETE NON-VASCULAR   12/16/2022 8:15 AM     HISTORY: Right flank pain, history of kidney stones, 25 weeks  pregnant.    COMPARISON: CT abdomen and pelvis on 2/15/2022.    FINDINGS:  Right Kidney: It measures 11.7 x 8.0 x 6.0 cm. It demonstrates normal  cortical echogenicity and thickness of 1.1 cm. There is 8 mm echogenic  focus at the lower pole of the kidney. There is moderate  hydronephrosis.    Left Kidney: It measures 12.2 x 6.1 x 4.6 cm. It demonstrates normal  cortical echogenicity and thickness of 1.3 cm. No hydronephrosis or  shadowing calculi.    Bladder: Distended and unremarkable.      Impression    IMPRESSION: There is moderate right hydronephrosis. No obstructing  ureteral stone visualized. There is 8 mm echogenic focus at the lower  pole of the right kidney, likely represents kidney stone.      MARICEL RAPHAEL MD         SYSTEM ID:  [id number]       ED MEDICATIONS:   Medications   lactated ringers BOLUS 1,000 mL (0 mLs Intravenous Stopped 12/16/22 0818)     Followed by   lactated ringers infusion (0 mL/hr Intravenous Stopped 12/16/22 1326)   ondansetron (ZOFRAN) injection 4 mg (4 mg Intravenous Given " 12/16/22 0518)   acetaminophen (TYLENOL) tablet 975 mg (975 mg Oral Given 12/16/22 0519)   HYDROmorphone (PF) (DILAUDID) injection 0.5 mg (0.5 mg Intravenous Given 12/16/22 0818)   prochlorperazine (COMPAZINE) injection 10 mg (10 mg Intravenous Given 12/16/22 1134)         Impression:    ICD-10-CM    1. Right flank pain  R10.9       2. Renal colic  N23           Plan:    Charged home, Percocet, ondansetron, plenty of fluids, placed urology stone referral    Return here for pain, vomiting, fever or any other concern.      MD Nathen Carson Scott L, MD  12/16/22 1714

## 2022-12-16 NOTE — ED PROVIDER NOTES
History     Chief Complaint   Patient presents with     Flank Pain     HPI  Franchesca Brewer is a 32 year old female, G1, P0 at 25+ week gestation, past medical history significant for urolithiasis, presents to the emergency department with concerns of right flank pain beginning around 6:00 AM this morning.  Pain is reminiscent of previous urolithiasis experience when the patient passed a 3 mm stone on the right side in February of this year I actually saw her at that ER visit.  We reviewed the CT in the room at the time of her initial interview.  The patient identifies sudden onset of right flank pain awakening from sleep at around 6:00 AM today.  The pain is continuous but comes in waves i.e. colicky fashion.  Associate with nausea and vomiting.  The patient has tried taking Tylenol at home with limited success that she keeps vomiting get up.  She notes no fever chills or sweats.  No change in bowel habit from baseline constipation in pregnancy.  She notes some urgency but no frequency or dysuria.  No hematuria grossly.  The symptoms are exactly like what she had with a kidney stone in the past.  25+ week gestation as noted, notes no change in fetal movement from baseline, no vaginal bleeding and no unusual vaginal discharge.    CT ABDOMEN/PELVIS WITHOUT CONTRAST February 15, 2022 1:16 PM     CLINICAL HISTORY: Flank pain, kidney stone suspected.     TECHNIQUE: CT scan of the abdomen and pelvis was performed without IV  contrast. Multiplanar reformats were obtained. Dose reduction  techniques were used.  CONTRAST: None.     COMPARISON: 1/23/2012.     FINDINGS:   LOWER CHEST: Normal.     HEPATOBILIARY: Normal.     PANCREAS: Normal.     SPLEEN: Normal.     ADRENAL GLANDS: Normal.     KIDNEYS/BLADDER: There are four calcifications in the right renal  collecting system measuring up to 4 mm. There is mild right  hydronephrosis and hydroureter. There is a 3 mm calculus at the right  ureterovesical junction (series  5, image 188). No urinary tract  calculi or hydronephrosis on the left.     BOWEL: Normal appendix. No bowel obstruction or inflammatory change.     LYMPH NODES: Normal.     VASCULATURE: Unremarkable.     PELVIC ORGANS: Normal.     OTHER: No ascites.     MUSCULOSKELETAL: Unremarkable.                                                                      IMPRESSION: Mild right hydronephrosis and hydroureter above a 3 mm  calculus at the right ureterovesical junction. There are four  additional calcifications in the right renal collecting system.     MONALISA CUTLER MD           Allergies:  No Known Allergies    Problem List:    Patient Active Problem List    Diagnosis Date Noted     Prenatal care, first pregnancy 2022     Priority: Medium     Female infertility 2022     Priority: Medium        Past Medical History:    No past medical history on file.    Past Surgical History:    Past Surgical History:   Procedure Laterality Date     MYRINGOTOMY, INSERT TUBE BILATERAL, COMBINED       [place] REMOVE TONSILS/ADENOIDS,<11 Y/O      age 9       Family History:    Family History   Problem Relation Age of Onset     Crohn's Disease Father        Social History:  Marital Status:   [2]  Social History     Tobacco Use     Smoking status: Former     Packs/day: 0.50     Years: 7.00     Pack years: 3.50     Types: Cigarettes     Quit date: 2017     Years since quittin.5     Smokeless tobacco: Never   Vaping Use     Vaping Use: Never used   Substance Use Topics     Alcohol use: Not Currently     Drug use: Never        Medications:    ondansetron (ZOFRAN ODT) 4 MG ODT tab  oxyCODONE-acetaminophen (PERCOCET) 5-325 MG tablet  Calcium Carbonate Antacid (TUMS PO)  Prenatal Vit-Fe Fumarate-FA (PRENATAL MULTIVITAMIN W/IRON) 27-0.8 MG tablet          Review of Systems   All other systems reviewed and are negative.      Physical Exam   BP: (!) 144/92  Pulse: 100  Temp: 97.7  F (36.5  C)  Resp: 18  Height: 149.9 cm (4'  "11\")  Weight: 83.9 kg (185 lb)  SpO2: 97 %      Physical Exam  Vitals and nursing note reviewed.   Constitutional:       General: She is in acute distress.      Appearance: Normal appearance. She is normal weight.   HENT:      Head: Normocephalic and atraumatic.      Right Ear: Tympanic membrane normal.      Left Ear: Tympanic membrane normal.      Nose: Nose normal.      Mouth/Throat:      Mouth: Mucous membranes are dry.      Pharynx: Oropharynx is clear.   Eyes:      Extraocular Movements: Extraocular movements intact.      Conjunctiva/sclera: Conjunctivae normal.   Cardiovascular:      Rate and Rhythm: Normal rate and regular rhythm.      Pulses: Normal pulses.      Heart sounds: Normal heart sounds.   Pulmonary:      Effort: Pulmonary effort is normal.      Breath sounds: Normal breath sounds.   Abdominal:      General: Bowel sounds are normal.      Palpations: Abdomen is soft.   Musculoskeletal:         General: Normal range of motion.      Cervical back: Normal range of motion and neck supple.   Skin:     General: Skin is warm and dry.      Capillary Refill: Capillary refill takes less than 2 seconds.   Neurological:      General: No focal deficit present.      Mental Status: She is alert and oriented to person, place, and time.   Psychiatric:         Mood and Affect: Mood normal.         Behavior: Behavior normal.         ED Course           6:47 PM  Historically consistent with probable renal colic.  Reviewed CT above from 2/22 noting multiple calcifications 4 mm and larger in the right kidney.  I strongly suspect that this patient does have renal colic.  Obviously given her 25-week gestation we would prefer to avoid irradiating her abdomen if possible.  I discussed this with her and her significant other.  At this point we will hold off on imaging, hydration, pain control, CBC and CMP as I certainly need to know her renal function.  Her abdomen is soft and there is been no change in fetal movements, no " "vaginal bleeding or discharge to raise a suspicion of possible pregnancy related etiology for her pain.  Additionally the behavior historically and on exam would be inconsistent with an acute inflammatory process such as acute appendicitis given the patient's benign abdomen.  Given her 25-week gestation would prefer to avoid NSAIDs if possible.  I will treat her with Dilaudid and Zofran IV fluids and assess for response.    10:22 PM  At this point the patient's pain is gone.  She has been up to ambulate and void.  No difficulties.  I discussed with her that her urinalysis today is consistent with a \"stone urine\", I have no suspicion for infection based upon acuity of symptoms and the appearance of this urine.  The patient does have a systemic leukocytosis and I think this is most likely multifactorial by way of physiologic leukocytosis of pregnancy as well as stress demargination with her acute pain presentation.  I do not think that this is related to infection.  Her renal function is normal.  She is improved markedly with 1 dose of Dilaudid, IV fluids and Zofran.  She was extremely relieved that her pain is gone, optimistically I hope that she is passed the stone.  She is at this point near where I would expect the Dilaudid to wear off and she has no pain so this may be the case.  If she has ongoing pain at home I encouraged to use Tylenol first, Zofran for nausea/vomiting and aggressively push fluids.  I have also given her a prescription for oxycodone to use for breakthrough pain and explained how this would be safe at this point in her pregnancy if she needs to use it for pain.  If the symptoms are not improving with the use of these medicines at home I recommended that she return to the emergency department.               Procedures              Critical Care time:  none               Results for orders placed or performed during the hospital encounter of 12/15/22 (from the past 24 hour(s))   UA with " Microscopic reflex to Culture    Specimen: Urine, Midstream   Result Value Ref Range    Color Urine Straw Colorless, Straw, Light Yellow, Yellow    Appearance Urine Clear Clear    Glucose Urine Negative Negative mg/dL    Bilirubin Urine Negative Negative    Ketones Urine 15 (A) Negative mg/dL    Specific Gravity Urine 1.020 1.003 - 1.035    Blood Urine Large (A) Negative    pH Urine 5.0 5.0 - 7.0    Protein Albumin Urine Trace (A) Negative mg/dL    Urobilinogen Urine Normal Normal, 2.0 mg/dL    Nitrite Urine Negative Negative    Leukocyte Esterase Urine Negative Negative    Bacteria Urine Few (A) None Seen /HPF    Mucus Urine Present (A) None Seen /LPF    RBC Urine 52 (H) <=2 /HPF    WBC Urine 6 (H) <=5 /HPF    Squamous Epithelials Urine 1 <=1 /HPF    Narrative    Urine Culture not indicated   CBC with platelets, differential    Narrative    The following orders were created for panel order CBC with platelets, differential.  Procedure                               Abnormality         Status                     ---------                               -----------         ------                     CBC with platelets and d...[[id number]]  Abnormal            Final result                 Please view results for these tests on the individual orders.   Comprehensive metabolic panel   Result Value Ref Range    Sodium 134 (L) 136 - 145 mmol/L    Potassium 4.5 3.4 - 5.3 mmol/L    Chloride 101 98 - 107 mmol/L    Carbon Dioxide (CO2) 17 (L) 22 - 29 mmol/L    Anion Gap 16 (H) 7 - 15 mmol/L    Urea Nitrogen 9.3 6.0 - 20.0 mg/dL    Creatinine 0.63 0.51 - 0.95 mg/dL    Calcium 9.2 8.6 - 10.0 mg/dL    Glucose 89 70 - 99 mg/dL    Alkaline Phosphatase 61 35 - 104 U/L    AST 38 (H) 10 - 35 U/L    ALT 9 (L) 10 - 35 U/L    Protein Total 7.1 6.4 - 8.3 g/dL    Albumin 3.6 3.5 - 5.2 g/dL    Bilirubin Total 0.2 <=1.2 mg/dL    GFR Estimate >90 >60 mL/min/1.73m2   CBC with platelets and differential   Result Value Ref Range    WBC Count 18.6  (H) 4.0 - 11.0 10e3/uL    RBC Count 3.42 (L) 3.80 - 5.20 10e6/uL    Hemoglobin 11.0 (L) 11.7 - 15.7 g/dL    Hematocrit 30.8 (L) 35.0 - 47.0 %    MCV 90 78 - 100 fL    MCH 32.2 26.5 - 33.0 pg    MCHC 35.7 31.5 - 36.5 g/dL    RDW 12.2 10.0 - 15.0 %    Platelet Count 243 150 - 450 10e3/uL    % Neutrophils 85 %    % Lymphocytes 9 %    % Monocytes 5 %    % Eosinophils 0 %    % Basophils 0 %    % Immature Granulocytes 1 %    NRBCs per 100 WBC 0 <1 /100    Absolute Neutrophils 15.6 (H) 1.6 - 8.3 10e3/uL    Absolute Lymphocytes 1.7 0.8 - 5.3 10e3/uL    Absolute Monocytes 1.0 0.0 - 1.3 10e3/uL    Absolute Eosinophils 0.0 0.0 - 0.7 10e3/uL    Absolute Basophils 0.0 0.0 - 0.2 10e3/uL    Absolute Immature Granulocytes 0.2 <=0.4 10e3/uL    Absolute NRBCs 0.0 10e3/uL       Medications   dextrose 5% and 0.9% NaCl infusion ( Intravenous New Bag 12/15/22 1853)   HYDROmorphone (PF) (DILAUDID) injection 0.5 mg (0.5 mg Intravenous Given 12/15/22 1949)   ondansetron (ZOFRAN) injection 4 mg ( Intravenous Canceled Entry 12/15/22 2002)   ondansetron (ZOFRAN) injection 4 mg (4 mg Intravenous Given 12/15/22 1936)       Assessments & Plan (with Medical Decision Making)   Assessments and plan with medical decision making at the time stamp above.      Disclaimer: This note consists of symbols derived from keyboarding, dictation and/or voice recognition software. As a result, there may be errors in the script that have gone undetected. Please consider this when interpreting information found in this chart.      I have reviewed the nursing notes.    I have reviewed the findings, diagnosis, plan and need for follow up with the patient.          New Prescriptions    ONDANSETRON (ZOFRAN ODT) 4 MG ODT TAB    Take 1 tablet (4 mg) by mouth every 8 hours as needed for nausea    OXYCODONE-ACETAMINOPHEN (PERCOCET) 5-325 MG TABLET    Take 1 tablet by mouth every 6 hours as needed for severe pain (7-10), moderate pain (4-6) or moderate to severe pain        Final diagnoses:   Renal colic       12/15/2022   [facility] EMERGENCY DEPT     Dave Fischer MD  12/15/22 4721

## 2022-12-16 NOTE — TELEPHONE ENCOUNTER
MEDICAL RECORDS REQUEST   [facility]  Urology Clinic  [address] [place] [address]  PHONE: [phone]  Fax: [phone]        FUTURE VISIT INFORMATION                                                   Franchesca Brewer, : 1990 scheduled for future visit at [facility]    APPOINTMENT INFORMATION:    Date: 2022    Provider:  Evie Olson MD    Reason for Visit/Diagnosis: stone, pregnant, MARIE done, 8mm    REFERRAL INFORMATION:    Referring provider:  Trey Sena MD    RECORDS REQUESTED FOR VISIT                                                     NOTES  STATUS/DETAILS   OFFICE NOTE from other specialist  yes, 12/15/2022 -- Kadi Raza APRN CNP @ [facility]   DISCHARGE REPORT from the ER  yes, 2022, 12/15/2022, 02/15/2022 -- [facility] ED   MEDICATION LIST  yes   LABS     URINALYSIS (UA)  yes   IMAGES  yes, 2022 -- US RENAL  02/15/2022 -- CT ABD PELVIS     PRE-VISIT CHECKLIST      Record collection complete Yes   Appointment appropriately scheduled           (right time/right provider) Yes   Joint diagnostic appointment coordinated correctly          (ensure right order & amount of time) Yes   MyChart activation Yes   Questionnaire complete If no, please explain pending

## 2022-12-18 LAB — BACTERIA UR CULT: NORMAL

## 2022-12-19 ENCOUNTER — LAB (OUTPATIENT)
Dept: LAB | Facility: CLINIC | Age: 32
End: 2022-12-19
Payer: COMMERCIAL

## 2022-12-19 DIAGNOSIS — R73.09 ABNORMAL GLUCOSE TOLERANCE TEST: ICD-10-CM

## 2022-12-19 LAB
GESTATIONAL GTT 1 HR POST DOSE: 147 MG/DL (ref 60–179)
GESTATIONAL GTT 2 HR POST DOSE: 173 MG/DL (ref 60–154)
GESTATIONAL GTT 3 HR POST DOSE: 138 MG/DL (ref 60–139)
GLUCOSE P FAST SERPL-MCNC: 82 MG/DL (ref 60–94)

## 2022-12-19 PROCEDURE — 82952 GTT-ADDED SAMPLES: CPT

## 2022-12-19 PROCEDURE — 82951 GLUCOSE TOLERANCE TEST (GTT): CPT

## 2022-12-19 PROCEDURE — 36415 COLL VENOUS BLD VENIPUNCTURE: CPT

## 2022-12-20 ENCOUNTER — PRE VISIT (OUTPATIENT)
Dept: UROLOGY | Facility: CLINIC | Age: 32
End: 2022-12-20

## 2022-12-20 ENCOUNTER — VIRTUAL VISIT (OUTPATIENT)
Dept: UROLOGY | Facility: CLINIC | Age: 32
End: 2022-12-20
Payer: COMMERCIAL

## 2022-12-20 DIAGNOSIS — N13.30 HYDRONEPHROSIS, UNSPECIFIED HYDRONEPHROSIS TYPE: Primary | ICD-10-CM

## 2022-12-20 DIAGNOSIS — R10.9 RIGHT FLANK PAIN: ICD-10-CM

## 2022-12-20 DIAGNOSIS — N23 RENAL COLIC: ICD-10-CM

## 2022-12-20 PROCEDURE — 99204 OFFICE O/P NEW MOD 45 MIN: CPT | Mod: 95 | Performed by: STUDENT IN AN ORGANIZED HEALTH CARE EDUCATION/TRAINING PROGRAM

## 2022-12-20 NOTE — PROGRESS NOTES
UROLOGY OUTPATIENT VISIT      Chief Complaint:   hydronephrosis      Synopsis   Franchesca Brewer is a very pleasant AGE: 32 year old year old person who is  at around 25 weeks gestation    Father's side with kidney stones    She recently presented to the ER on 2022 with R flank pain. +emesis.     She has had a stone before earlier this year that she passed spontaneously, it was 3 mm right UVJ stone    Currently feels well. No fevers, chills, flank pain. No dysuria hematuia    Imaging   I personally reviewed the CT scan from 2/15/2022 and by my interpretation it demonstrates 3 mm right UVJ stone and there are several renal stones 2 mm upper, 1 mm mid x 2. No stones in the left kidney.     I reviewed the renal US from 2022. There is moderate right hydronephrosis noted. No ureteral dilation in the distal ureter in the bladder views noted, no view of ureteral jets seen. There is this 7 mm hyperechoic object in kidney, but I don't see any shadowing.         The following distinct labs were reviewed   Most Recent 3 CBC's:Recent Labs   Lab Test 12/15/22  1843 22  1514 22  1140   WBC 18.6* 11.3* 11.4*   HGB 11.0* 11.0* 12.9   MCV 90 96 92    245 264     Most Recent 3 BMP's:Recent Labs   Lab Test 12/15/22  1843 02/15/22  1224   * 139   POTASSIUM 4.5 3.0*   CHLORIDE 101 108   CO2 17* 23   BUN 9.3 14   CR 0.63 0.80   ANIONGAP 16* 8   FERNY 9.2 8.7   GLC 89 115*     Most Recent Urinalysis:Recent Labs   Lab Test 12/15/22  1719 12/15/22  1142   COLOR Straw Yellow   APPEARANCE Clear Cloudy*   URINEGLC Negative Negative   URINEBILI Negative Negative   URINEKETONE 15* Negative   SG 1.020 >=1.030   UBLD Large* Large*   URINEPH 5.0 5.5   PROTEIN Trace* Trace*   UROBILINOGEN  --  0.2   NITRITE Negative Negative   LEUKEST Negative Negative   RBCU 52* *   WBCU 6* 0-5     Urine culture   10,000-50,000 CFU/mL Mixture of urogenital miguel angel     Medical Comorbidities    No past medical  history on file.            Medications     Current Outpatient Medications   Medication     ondansetron (ZOFRAN ODT) 4 MG ODT tab     oxyCODONE-acetaminophen (PERCOCET) 5-325 MG tablet     Prenatal Vit-Fe Fumarate-FA (PRENATAL MULTIVITAMIN W/IRON) 27-0.8 MG tablet     Calcium Carbonate Antacid (TUMS PO)     No current facility-administered medications for this visit.            Assessment/Plan   32 year old year old female with history of kidney stones and currently  at around 25 weeks presenting with flank pain and renal US with right hydronephrosis    1. Right Hydronephrosis   Discussed with her that hydronephrosis on the right side can be either due to physiologic hydronephrosis of pregnancy or due to a ureteral stone causing obstruction. Discussed that sometimes even physiologic hydronephrosis can cause pain like stone. However she did have renal stones on CT scan earlier this year and there is microhematuria and I think this increases likelihood she maybe passing a stone. Her stones previously were small and if this one is of similar size then should have high chance of spontaneous passage.     I did discuss with her risks of infection and if she has obstructing stone will need decompression with either stent or or PCN. Went over pros and cons of either; Discussed they would need to get changed every 4-6 weeks. Gave her ER return warnings for fevers, intractable pain, nausea vomiting    I discussed that for imaging, renal US can show hydronephrosis and sometimes distal ureter stones but otherwise hard to diagnose ureteral stones. Other option include MRI and last resort we can do CT scan but risks of radiation    We will start with repeating the renal US to assess for bladder jets. If there are ureter jets its reassuring she's not obstructed, I will see her back in 2 weeks.      CC:  [facility]    Joined the call at 2022, 1:00:29 pm.  Left the call at 2022,  1:20:30 pm.  You were on the call for 20 minutes .

## 2022-12-20 NOTE — NURSING NOTE
Franchesca Brewer  is being evaluated via a billable video visit.      How would you like to obtain your AVS? [employer]  For the video visit, send the invitation by: Text to cell phone: [phone]  Will anyone else be joining your video visit? Polly Owens,   Virtual Visit Facilitator

## 2022-12-20 NOTE — PROGRESS NOTES
Video-Visit Details    Type of service:  Video Visit    Video Start Time (time video started): 12/20/2022, 1:00:29 pm.    Video End Time (time video stopped):  12/20/2022, 1:20:30 pm.    Originating Location (pt. Location): Home        Distant Location (provider location):  On-site    Mode of Communication:  Video Conference via John Owens

## 2022-12-20 NOTE — LETTER
2022       RE: Franchesca Brewer  [address] [address]     Dear Colleague,    Thank you for referring your patient, Franchesca Brewer, to the [facility] [place] at [facility]. Please see a copy of my visit note below.          UROLOGY OUTPATIENT VISIT      Chief Complaint:   hydronephrosis      Synopsis   Franchesca Brewer is a very pleasant AGE: 32 year old year old person who is  at around 25 weeks gestation    Father's side with kidney stones    She recently presented to the ER on 2022 with R flank pain. +emesis.     She has had a stone before earlier this year that she passed spontaneously, it was 3 mm right UVJ stone    Currently feels well. No fevers, chills, flank pain. No dysuria hematuia    Imaging   I personally reviewed the CT scan from 2/15/2022 and by my interpretation it demonstrates 3 mm right UVJ stone and there are several renal stones 2 mm upper, 1 mm mid x 2. No stones in the left kidney.     I reviewed the renal US from 2022. There is moderate right hydronephrosis noted. No ureteral dilation in the distal ureter in the bladder views noted, no view of ureteral jets seen. There is this 7 mm hyperechoic object in kidney, but I don't see any shadowing.         The following distinct labs were reviewed   Most Recent 3 CBC's:Recent Labs   Lab Test 12/15/22  1843 22  1514 22  1140   WBC 18.6* 11.3* 11.4*   HGB 11.0* 11.0* 12.9   MCV 90 96 92    245 264     Most Recent 3 BMP's:Recent Labs   Lab Test 12/15/22  1843 02/15/22  1224   * 139   POTASSIUM 4.5 3.0*   CHLORIDE 101 108   CO2 17* 23   BUN 9.3 14   CR 0.63 0.80   ANIONGAP 16* 8   FERNY 9.2 8.7   GLC 89 115*     Most Recent Urinalysis:Recent Labs   Lab Test 12/15/22  1719 12/15/22  1142   COLOR Straw Yellow   APPEARANCE Clear Cloudy*   URINEGLC Negative Negative   URINEBILI Negative Negative   URINEKETONE 15*  Negative   SG 1.020 >=1.030   UBLD Large* Large*   URINEPH 5.0 5.5   PROTEIN Trace* Trace*   UROBILINOGEN  --  0.2   NITRITE Negative Negative   LEUKEST Negative Negative   RBCU 52* *   WBCU 6* 0-5     Urine culture   10,000-50,000 CFU/mL Mixture of urogenital miguel angel     Medical Comorbidities    No past medical history on file.            Medications     Current Outpatient Medications   Medication     ondansetron (ZOFRAN ODT) 4 MG ODT tab     oxyCODONE-acetaminophen (PERCOCET) 5-325 MG tablet     Prenatal Vit-Fe Fumarate-FA (PRENATAL MULTIVITAMIN W/IRON) 27-0.8 MG tablet     Calcium Carbonate Antacid (TUMS PO)     No current facility-administered medications for this visit.            Assessment/Plan   32 year old year old female with history of kidney stones and currently  at around 25 weeks presenting with flank pain and renal US with right hydronephrosis    1. Right Hydronephrosis   Discussed with her that hydronephrosis on the right side can be either due to physiologic hydronephrosis of pregnancy or due to a ureteral stone causing obstruction. Discussed that sometimes even physiologic hydronephrosis can cause pain like stone. However she did have renal stones on CT scan earlier this year and there is microhematuria and I think this increases likelihood she maybe passing a stone. Her stones previously were small and if this one is of similar size then should have high chance of spontaneous passage.     I did discuss with her risks of infection and if she has obstructing stone will need decompression with either stent or or PCN. Went over pros and cons of either; Discussed they would need to get changed every 4-6 weeks. Gave her ER return warnings for fevers, intractable pain, nausea vomiting    I discussed that for imaging, renal US can show hydronephrosis and sometimes distal ureter stones but otherwise hard to diagnose ureteral stones. Other option include MRI and last resort we can do CT scan but  risks of radiation    We will start with repeating the renal US to assess for bladder jets. If there are ureter jets its reassuring she's not obstructed, I will see her back in 2 weeks.      CC:  [place] [facility]    Joined the call at 12/20/2022, 1:00:29 pm.  Left the call at 12/20/2022, 1:20:30 pm.  You were on the call for 20 minutes .        Video-Visit Details    Type of service:  Video Visit    Video Start Time (time video started): 12/20/2022, 1:00:29 pm.    Video End Time (time video stopped):  12/20/2022, 1:20:30 pm.    Originating Location (pt. Location): Home        Distant Location (provider location):  On-site    Mode of Communication:  Video Conference via John Owens      Sincerely,    Evie Olson MD

## 2022-12-30 ENCOUNTER — HOSPITAL ENCOUNTER (OUTPATIENT)
Dept: ULTRASOUND IMAGING | Facility: CLINIC | Age: 32
Discharge: HOME | End: 2022-12-30
Attending: STUDENT IN AN ORGANIZED HEALTH CARE EDUCATION/TRAINING PROGRAM | Admitting: STUDENT IN AN ORGANIZED HEALTH CARE EDUCATION/TRAINING PROGRAM
Payer: COMMERCIAL

## 2022-12-30 DIAGNOSIS — N13.30 HYDRONEPHROSIS, UNSPECIFIED HYDRONEPHROSIS TYPE: ICD-10-CM

## 2022-12-30 PROCEDURE — 76770 US EXAM ABDO BACK WALL COMP: CPT

## 2023-01-02 ENCOUNTER — PRENATAL OFFICE VISIT (OUTPATIENT)
Dept: OBGYN | Facility: CLINIC | Age: 33
End: 2023-01-02
Payer: COMMERCIAL

## 2023-01-02 VITALS
DIASTOLIC BLOOD PRESSURE: 70 MMHG | SYSTOLIC BLOOD PRESSURE: 107 MMHG | HEART RATE: 72 BPM | BODY MASS INDEX: 37.77 KG/M2 | WEIGHT: 187 LBS | TEMPERATURE: 97.7 F

## 2023-01-02 DIAGNOSIS — Z34.02 ENCOUNTER FOR PRENATAL CARE IN SECOND TRIMESTER OF FIRST PREGNANCY: ICD-10-CM

## 2023-01-02 DIAGNOSIS — E66.9 OBESITY WITHOUT SERIOUS COMORBIDITY, UNSPECIFIED CLASSIFICATION, UNSPECIFIED OBESITY TYPE: Primary | ICD-10-CM

## 2023-01-02 PROCEDURE — 99207 PR PRENATAL VISIT: CPT | Performed by: STUDENT IN AN ORGANIZED HEALTH CARE EDUCATION/TRAINING PROGRAM

## 2023-01-02 PROCEDURE — 90471 IMMUNIZATION ADMIN: CPT | Performed by: STUDENT IN AN ORGANIZED HEALTH CARE EDUCATION/TRAINING PROGRAM

## 2023-01-02 PROCEDURE — 90715 TDAP VACCINE 7 YRS/> IM: CPT | Performed by: STUDENT IN AN ORGANIZED HEALTH CARE EDUCATION/TRAINING PROGRAM

## 2023-01-02 RX ORDER — FAMOTIDINE 20 MG/1
20 TABLET, FILM COATED ORAL 2 TIMES DAILY
COMMUNITY

## 2023-01-02 NOTE — PROGRESS NOTES
[facility]  Return OB Note    Subjective:  Denies vaginal bleeding, loss of fluid, or regular contractions. Noted normal fetal movement.  Complaints today: none    Objective:  /70 (BP Location: Right arm, Patient Position: Chair, Cuff Size: Adult Regular)   Pulse 72   Temp 97.7  F (36.5  C) (Tympanic)   Wt 84.8 kg (187 lb)   LMP 2022   BMI 37.77 kg/m      Fundal height: 29cm  FHT: 130bpm    Assessment/Plan:   Franchesca Bowen is a 32 year old  at 27w5d by LMP, here for return OB visit.    -First OB labs normal except positive urine culture. Treated. Repeat urine improved.  -Genetic screening: NIPT low risk, normal quad screen  -Anatomy ultrasound WNL  -Mid trimester labs noted for failed GCT, passed GTT  -S/p influenza vaccine 10/12/2022. Recommended COVID moderna vaccine booster. Tdap vaccine given today.  -Obesity: BMI 37: plan for growth US at 32 weeks, weekly BPPs at 37 weeks    RTC 2 weeks    Sabine Regalado MD  Obstetrics and Gynecology  [facility]   2023

## 2023-01-02 NOTE — NURSING NOTE
"Initial /70 (BP Location: Right arm, Patient Position: Chair, Cuff Size: Adult Regular)   Pulse 72   Temp 97.7  F (36.5  C) (Tympanic)   Wt 84.8 kg (187 lb)   LMP 06/22/2022   BMI 37.77 kg/m   Estimated body mass index is 37.77 kg/m  as calculated from the following:    Height as of 12/16/22: 1.499 m (4' 11\").    Weight as of this encounter: 84.8 kg (187 lb). .    Mayela Gupta MA    "

## 2023-01-03 ENCOUNTER — VIRTUAL VISIT (OUTPATIENT)
Dept: UROLOGY | Facility: CLINIC | Age: 33
End: 2023-01-03
Payer: COMMERCIAL

## 2023-01-03 DIAGNOSIS — N20.0 NEPHROLITHIASIS: Primary | ICD-10-CM

## 2023-01-03 PROCEDURE — 99213 OFFICE O/P EST LOW 20 MIN: CPT | Mod: 95 | Performed by: STUDENT IN AN ORGANIZED HEALTH CARE EDUCATION/TRAINING PROGRAM

## 2023-01-03 NOTE — PROGRESS NOTES
UROLOGY OUTPATIENT VISIT      Chief Complaint:   hydronephrosis      Synopsis   Franchesca Brewer is a very pleasant AGE: 32 year old year old person who is  at around 27 weeks gestation.Father's side with kidney stones    She recently presented to the ER on 2022 with R flank pain. +emesis. She has had a stone before earlier this year that she passed spontaneously, it was 3 mm right UVJ stone. A renal US showed moderate R hydronephrosis and perhaps 7 mm stone in kidney ()    She is here to follow-up after repeat renal US  Today she feels well  Has not noticed stone pass  No flank pain  No fevers or chills    Imaging  Renal US from  reviewed, there is no hydronephrosis, there appears to be a hyperechoic object, likely stone in the distal right ureter that can be seen on bladder view.         Urine culture   10,000-50,000 CFU/mL Mixture of urogenital miguel angel     Medical Comorbidities    No past medical history on file.            Medications     Current Outpatient Medications   Medication     famotidine (PEPCID) 20 MG tablet     ondansetron (ZOFRAN ODT) 4 MG ODT tab     oxyCODONE-acetaminophen (PERCOCET) 5-325 MG tablet     Prenatal Vit-Fe Fumarate-FA (PRENATAL MULTIVITAMIN W/IRON) 27-0.8 MG tablet     No current facility-administered medications for this visit.            Assessment/Plan   32 year old year old female with history of kidney stones and currently  at around 27 weeks     1. Right Hydronephrosis - resolved.     2. Right Nephrolithiasis - 4 mm right distal ureter stone, no R hydro and pain controlled, will proceed with trial of passage. Risks and benefits including probability of stone passage, recurrent renal colic, and requirement of emergency medical and/or surgical care and further imaging .She will return in 4 weeks with renal US    CC:  [facility]

## 2023-01-03 NOTE — NURSING NOTE
Franchesca Bowen  is being evaluated via a billable video visit.      How would you like to obtain your AVS? [employer]  For the video visit, send the invitation by: Text to cell phone: [phone]  Will anyone else be joining your video visit? No Shelby Kocher

## 2023-01-03 NOTE — PROGRESS NOTES
Video-Visit Details    Type of service:  Video Visit    Video Start Time (time video started):  12:15    Video End Time (time video stopped): 12:23     Originating Location (pt. Location): Home        Distant Location (provider location):  On-site    Mode of Communication:  Video Conference via [employer] Shelby Kocher

## 2023-01-03 NOTE — LETTER
1/3/2023       RE: Franchesca Bowen  [address] [address]     Dear Colleague,    Thank you for referring your patient, Franchesca Bowen, to the [facility] [place] at [facility]. Please see a copy of my visit note below.          UROLOGY OUTPATIENT VISIT      Chief Complaint:   hydronephrosis      Synopsis   Franchesca Brewer is a very pleasant AGE: 32 year old year old person who is  at around 27 weeks gestation.Father's side with kidney stones    She recently presented to the ER on 2022 with R flank pain. +emesis. She has had a stone before earlier this year that she passed spontaneously, it was 3 mm right UVJ stone. A renal US showed moderate R hydronephrosis and perhaps 7 mm stone in kidney ()    She is here to follow-up after repeat renal US  Today she feels well  Has not noticed stone pass  No flank pain  No fevers or chills    Imaging  Renal US from  reviewed, there is no hydronephrosis, there appears to be a hyperechoic object, likely stone in the distal right ureter that can be seen on bladder view.         Urine culture   10,000-50,000 CFU/mL Mixture of urogenital miguel angel     Medical Comorbidities    No past medical history on file.            Medications     Current Outpatient Medications   Medication     famotidine (PEPCID) 20 MG tablet     ondansetron (ZOFRAN ODT) 4 MG ODT tab     oxyCODONE-acetaminophen (PERCOCET) 5-325 MG tablet     Prenatal Vit-Fe Fumarate-FA (PRENATAL MULTIVITAMIN W/IRON) 27-0.8 MG tablet     No current facility-administered medications for this visit.            Assessment/Plan   32 year old year old female with history of kidney stones and currently  at around 27 weeks     1. Right Hydronephrosis - resolved.     2. Right Nephrolithiasis - 4 mm right distal ureter stone, no R hydro and pain controlled, will proceed with trial of passage. Risks and benefits including  probability of stone passage, recurrent renal colic, and requirement of emergency medical and/or surgical care and further imaging .She will return in 4 weeks with renal US    CC:  [place] [facility] [place] [facility]    Video-Visit Details    Type of service:  Video Visit    Video Start Time (time video started):  12:15    Video End Time (time video stopped): 12:23     Originating Location (pt. Location): Home        Distant Location (provider location):  On-site    Mode of Communication:  Video Conference via [facility]    Shelby Kocher Vishnuvardhan Ganesan, MD

## 2023-01-07 ENCOUNTER — HEALTH MAINTENANCE LETTER (OUTPATIENT)
Age: 33
End: 2023-01-07

## 2023-01-16 ENCOUNTER — PRENATAL OFFICE VISIT (OUTPATIENT)
Dept: OBGYN | Facility: CLINIC | Age: 33
End: 2023-01-16
Payer: COMMERCIAL

## 2023-01-16 VITALS
BODY MASS INDEX: 37.82 KG/M2 | HEIGHT: 59 IN | RESPIRATION RATE: 16 BRPM | DIASTOLIC BLOOD PRESSURE: 73 MMHG | SYSTOLIC BLOOD PRESSURE: 105 MMHG | HEART RATE: 75 BPM | WEIGHT: 187.6 LBS | TEMPERATURE: 97.8 F

## 2023-01-16 DIAGNOSIS — Z34.02 ENCOUNTER FOR PRENATAL CARE IN SECOND TRIMESTER OF FIRST PREGNANCY: Primary | ICD-10-CM

## 2023-01-16 PROCEDURE — 99207 PR PRENATAL VISIT: CPT | Performed by: OBSTETRICS & GYNECOLOGY

## 2023-01-16 NOTE — PROGRESS NOTES
"  Assessment & Plan     Franchesca is a 33 yo , 29w 5d, who presents today for routine prenatal follow up.    Routine Prenatal Visit  Fetal heart tones wnl. Fetal height increased for gestational age, potentially associated with elevated maternal BMI. Is scheduled to have fetal ultrasound to determine size on  and to begin begin BPPs at 37w.    RTC in 2 weeks, labor and FM precautions reviewed.     Harrison Melara, MS3  Stefany Rivers MD  [facility] [place]    Mario Sorensen is a 32 year old, presenting for the following health issues:  Prenatal Care    HPI   No new headache, SOB, Edema, chest pain. No fluid loss, no bleeding, feeling baby move lots, and no contractions.     Pecid is helping. Not taking zofran currently, was prescribed for kidney stones. Is having ultrasound at end of month to confirm the passing. Is not having any abnormal urinary symptoms.     No questions to be addressed.     Review of Systems   Constitutional, HEENT, cardiovascular, pulmonary, gi and gu systems are negative, except as otherwise noted.      Objective    /73 (BP Location: Left arm, Patient Position: Chair, Cuff Size: Adult Regular)   Pulse 75   Temp 97.8  F (36.6  C) (Tympanic)   Resp 16   Ht 1.499 m (4' 11\")   Wt 85.1 kg (187 lb 9.6 oz)   LMP 2022   BMI 37.89 kg/m    Body mass index is 37.89 kg/m .  Physical Exam   GENERAL: healthy, alert and no distress  RESP: No increased work of breathing  ABDOMEN: soft, nontender, no hepatosplenomegaly, no masses  SKIN: no suspicious lesions or rashes. No LE edema  PSYCH: mentation appears normal, affect normal/bright    No results found for this or any previous visit (from the past 24 hour(s)).        I agree with the medical student's documentation above and have edited it for accuracy. I was present for and performed the physical exam and interview of the patient myself. All medical decision-making was performed by me. If a procedure " was performed, that was performed by me. Additionally, if billing is based on time, I am only using the time that I personally spent with the patient in my time statement.     Stefany Rivers MD  OB/GYN

## 2023-01-16 NOTE — PROGRESS NOTES
"Initial /73 (BP Location: Left arm, Patient Position: Chair, Cuff Size: Adult Regular)   Pulse 75   Temp 97.8  F (36.6  C) (Tympanic)   Resp 16   Ht 1.499 m (4' 11\")   Wt 85.1 kg (187 lb 9.6 oz)   LMP 06/22/2022   BMI 37.89 kg/m   Estimated body mass index is 37.89 kg/m  as calculated from the following:    Height as of this encounter: 1.499 m (4' 11\").    Weight as of this encounter: 85.1 kg (187 lb 9.6 oz). .      "

## 2023-01-30 ENCOUNTER — PRENATAL OFFICE VISIT (OUTPATIENT)
Dept: OBGYN | Facility: CLINIC | Age: 33
End: 2023-01-30
Payer: COMMERCIAL

## 2023-01-30 ENCOUNTER — HOSPITAL ENCOUNTER (OUTPATIENT)
Dept: ULTRASOUND IMAGING | Facility: CLINIC | Age: 33
Discharge: HOME | End: 2023-01-30
Attending: STUDENT IN AN ORGANIZED HEALTH CARE EDUCATION/TRAINING PROGRAM
Payer: COMMERCIAL

## 2023-01-30 VITALS
HEIGHT: 59 IN | WEIGHT: 193 LBS | BODY MASS INDEX: 38.91 KG/M2 | DIASTOLIC BLOOD PRESSURE: 72 MMHG | HEART RATE: 76 BPM | SYSTOLIC BLOOD PRESSURE: 111 MMHG | RESPIRATION RATE: 16 BRPM | TEMPERATURE: 98.3 F

## 2023-01-30 DIAGNOSIS — E66.9 OBESITY WITHOUT SERIOUS COMORBIDITY, UNSPECIFIED CLASSIFICATION, UNSPECIFIED OBESITY TYPE: ICD-10-CM

## 2023-01-30 DIAGNOSIS — Z3A.31 31 WEEKS GESTATION OF PREGNANCY: Primary | ICD-10-CM

## 2023-01-30 DIAGNOSIS — N20.0 NEPHROLITHIASIS: ICD-10-CM

## 2023-01-30 PROCEDURE — 76816 OB US FOLLOW-UP PER FETUS: CPT

## 2023-01-30 PROCEDURE — 99207 PR PRENATAL VISIT: CPT | Performed by: OBSTETRICS & GYNECOLOGY

## 2023-01-30 PROCEDURE — 76770 US EXAM ABDO BACK WALL COMP: CPT

## 2023-01-30 NOTE — PROGRESS NOTES
"[facility]    Return OB Note    CC: Return OB     Subjective:  Franchesca is a 32 year old  at 31w5d   Denies vaginal bleeding, loss of fluid, or regular contractions. Pos fetal movement. No headache, visual disturbance or RUQ pain.  Complaints today: none    Objective:  /72 (BP Location: Left arm, Patient Position: Chair, Cuff Size: Adult Regular)   Pulse 76   Temp 98.3  F (36.8  C) (Tympanic)   Resp 16   Ht 1.499 m (4' 11\")   Wt 87.5 kg (193 lb)   LMP 2022   BMI 38.98 kg/m      See flowsheet      Assessment/Plan:       32 year old year old  female with IUP at 31w5d  Encounter Diagnosis   Name Primary?     31 weeks gestation of pregnancy Yes        -First OB labs normal except positive urine culture. Treated. Repeat urine improved.  -Genetic screening: NIPT low risk, normal quad screen  -Anatomy ultrasound WNL  -Mid trimester labs noted for failed GCT, passed GTT  -S/p influenza vaccine 10/12/2022. Recommended COVID moderna vaccine booster. Tdap vaccine given today.  -Obesity: BMI 37: plan for growth US at 32 weeks, weekly BPPs at 37 weeks, ultrasound done today.  AC 83%ile.    Return precautions given  Discussed  labor and preeclampsia precautions.  Discussed fetal movement precautions.    RTC 2 weeks    Luzmaria Winston MD  "

## 2023-01-30 NOTE — NURSING NOTE
"Initial /72 (BP Location: Left arm, Patient Position: Chair, Cuff Size: Adult Regular)   Pulse 76   Temp 98.3  F (36.8  C) (Tympanic)   Resp 16   Ht 1.499 m (4' 11\")   Wt 87.5 kg (193 lb)   LMP 06/22/2022   BMI 38.98 kg/m   Estimated body mass index is 38.98 kg/m  as calculated from the following:    Height as of this encounter: 1.499 m (4' 11\").    Weight as of this encounter: 87.5 kg (193 lb). .      "

## 2023-02-07 ENCOUNTER — VIRTUAL VISIT (OUTPATIENT)
Dept: UROLOGY | Facility: CLINIC | Age: 33
End: 2023-02-07
Payer: COMMERCIAL

## 2023-02-07 DIAGNOSIS — N20.0 NEPHROLITHIASIS: Primary | ICD-10-CM

## 2023-02-07 PROCEDURE — 99213 OFFICE O/P EST LOW 20 MIN: CPT | Mod: 95 | Performed by: STUDENT IN AN ORGANIZED HEALTH CARE EDUCATION/TRAINING PROGRAM

## 2023-02-07 NOTE — NURSING NOTE
Is the patient currently in the state of [place]? YES    Visit mode:VIDEO    If the visit is dropped, the patient can be reconnected by: VIDEO VISIT: Text to cell phone: [phone]    Will anyone else be joining the visit? NO      How would you like to obtain your AVS? MyChart    Are changes needed to the allergy or medication list? NO    Comments or concerns related to today's visit:

## 2023-02-07 NOTE — PROGRESS NOTES
Video-Visit Details    Type of service:  Video Visit    Joined the call at 2/7/2023, 1:33:31 pm.  Left the call at 2/7/2023, 1:38:00 pm.    Originating Location (pt. Location): Home        Distant Location (provider location):  On-site    Mode of Communication:  Video Conference via [employer]

## 2023-02-07 NOTE — LETTER
2023       RE: Franchesca Bowen  [address] [address]     Dear Colleague,    Thank you for referring your patient, Franchesca Bowen, to the [facility] [place] at [facility]. Please see a copy of my visit note below.    Video-Visit Details    Type of service:  Video Visit    Joined the call at 2023, 1:33:31 pm.  Left the call at 2023, 1:38:00 pm.    Originating Location (pt. Location): Home        Distant Location (provider location):  On-site    Mode of Communication:  Video Conference via [facility]                UROLOGY OUTPATIENT VISIT      Chief Complaint:   Follow-up ureter stone      Synopsis   Franchesca Bowen is a very pleasant AGE: 32 year old year old person  around 32 weeks.     2022 R flank pain. +emesis. A renal US showed moderate R hydronephrosis and perhaps 7 mm stone in kidney     1/3/2023 - follow-up after renal US. Renal US showed stone in R distal ureter vs phlebolith. No renal stone noted. We planned to get another US    Today: She is feeling well. No flank pain. Has not noticed any stones pass.    Imaging  Repeat renal US on 2023    Right Kidney: Normal cortical thickness and echogenicity measuring up  to 12.0 cm. Trace fullness of the right renal pelvis without sandy  hydronephrosis.     Left Kidney: Normal cortical thickness and echogenicity measuring up  to 11.8 cm. No hydronephrosis.     Bladder: Unremarkable. Bilateral ureteral jets noted.    The following distinct labs were reviewed   Most Recent 3 BMP's:Recent Labs   Lab Test 12/15/22  1843 02/15/22  1224   * 139   POTASSIUM 4.5 3.0*   CHLORIDE 101 108   CO2 17* 23   BUN 9.3 14   CR 0.63 0.80   ANIONGAP 16* 8   FERNY 9.2 8.7   GLC 89 115*       Medical Comorbidities    No past medical history on file.            Medications     Current Outpatient Medications   Medication     famotidine (PEPCID) 20 MG tablet      ondansetron (ZOFRAN ODT) 4 MG ODT tab     oxyCODONE-acetaminophen (PERCOCET) 5-325 MG tablet     Prenatal Vit-Fe Fumarate-FA (PRENATAL MULTIVITAMIN W/IRON) 27-0.8 MG tablet     No current facility-administered medications for this visit.            Assessment/Plan   32 year old year old female with history of kidney stones and currently  at around 31 weeks    1. Right Hydronephrosis  - essentially resolved.  2. Right Nephrolithiasis - Distal stone either passed or was phlebolith. There was possible a renal stone on US from before but not visualized since. Could have been false positive or maybe passed.   - I will plan to see her in May with renal US and KUB after her baby delivery which is supposed to be end of March to assess for any stones or hydronephrosis, if there are any concerns for stone we can get a low-dose CT scan.    CC:  [facility]      Sincerely,    Evie Olson MD

## 2023-02-07 NOTE — PROGRESS NOTES
UROLOGY OUTPATIENT VISIT      Chief Complaint:   Follow-up ureter stone      Synopsis   Franchesca Bowen is a very pleasant AGE: 32 year old year old person  around 32 weeks.     2022 R flank pain. +emesis. A renal US showed moderate R hydronephrosis and perhaps 7 mm stone in kidney     1/3/2023 - follow-up after renal US. Renal US showed stone in R distal ureter vs phlebolith. No renal stone noted. We planned to get another US    Today: She is feeling well. No flank pain. Has not noticed any stones pass.    Imaging  Repeat renal US on 2023    Right Kidney: Normal cortical thickness and echogenicity measuring up  to 12.0 cm. Trace fullness of the right renal pelvis without sandy  hydronephrosis.     Left Kidney: Normal cortical thickness and echogenicity measuring up  to 11.8 cm. No hydronephrosis.     Bladder: Unremarkable. Bilateral ureteral jets noted.    The following distinct labs were reviewed   Most Recent 3 BMP's:Recent Labs   Lab Test 12/15/22  1843 02/15/22  1224   * 139   POTASSIUM 4.5 3.0*   CHLORIDE 101 108   CO2 17* 23   BUN 9.3 14   CR 0.63 0.80   ANIONGAP 16* 8   FERNY 9.2 8.7   GLC 89 115*       Medical Comorbidities    No past medical history on file.            Medications     Current Outpatient Medications   Medication     famotidine (PEPCID) 20 MG tablet     ondansetron (ZOFRAN ODT) 4 MG ODT tab     oxyCODONE-acetaminophen (PERCOCET) 5-325 MG tablet     Prenatal Vit-Fe Fumarate-FA (PRENATAL MULTIVITAMIN W/IRON) 27-0.8 MG tablet     No current facility-administered medications for this visit.            Assessment/Plan   32 year old year old female with history of kidney stones and currently  at around 31 weeks    1. Right Hydronephrosis  - essentially resolved.  2. Right Nephrolithiasis - Distal stone either passed or was phlebolith. There was possible a renal stone on US from before but not visualized since. Could have been false positive or maybe passed.   - I  will plan to see her in May with renal US and KUB after her baby delivery which is supposed to be end of March to assess for any stones or hydronephrosis, if there are any concerns for stone we can get a low-dose CT scan.    CC:  [facility] - [place]

## 2023-02-08 ENCOUNTER — TELEPHONE (OUTPATIENT)
Dept: UROLOGY | Facility: CLINIC | Age: 33
End: 2023-02-08
Payer: COMMERCIAL

## 2023-02-08 NOTE — TELEPHONE ENCOUNTER
ERICKAM and callback to follow up with Dr. Olson in May 2023 (use a STONEY slot) with a renal US and xray KUB prior

## 2023-02-13 ENCOUNTER — PRENATAL OFFICE VISIT (OUTPATIENT)
Dept: OBGYN | Facility: CLINIC | Age: 33
End: 2023-02-13
Payer: COMMERCIAL

## 2023-02-13 ENCOUNTER — ANCILLARY ORDERS (OUTPATIENT)
Dept: OBGYN | Facility: CLINIC | Age: 33
End: 2023-02-13

## 2023-02-13 VITALS
WEIGHT: 198 LBS | TEMPERATURE: 97.9 F | SYSTOLIC BLOOD PRESSURE: 104 MMHG | BODY MASS INDEX: 39.99 KG/M2 | HEART RATE: 71 BPM | DIASTOLIC BLOOD PRESSURE: 70 MMHG

## 2023-02-13 DIAGNOSIS — E66.9 OBESITY WITHOUT SERIOUS COMORBIDITY, UNSPECIFIED CLASSIFICATION, UNSPECIFIED OBESITY TYPE: ICD-10-CM

## 2023-02-13 DIAGNOSIS — Z34.93 PRENATAL CARE, THIRD TRIMESTER: Primary | ICD-10-CM

## 2023-02-13 DIAGNOSIS — Z34.93 PRENATAL CARE, THIRD TRIMESTER: ICD-10-CM

## 2023-02-13 PROCEDURE — 99207 PR PRENATAL VISIT: CPT | Performed by: OBSTETRICS & GYNECOLOGY

## 2023-02-13 NOTE — NURSING NOTE
"Initial /70 (BP Location: Right arm, Patient Position: Chair, Cuff Size: Adult Regular)   Pulse 71   Temp 97.9  F (36.6  C) (Tympanic)   Wt 89.8 kg (198 lb)   LMP 06/22/2022   BMI 39.99 kg/m   Estimated body mass index is 39.99 kg/m  as calculated from the following:    Height as of 1/30/23: 1.499 m (4' 11\").    Weight as of this encounter: 89.8 kg (198 lb). .    Mayela Gupta MA    "

## 2023-02-13 NOTE — PROGRESS NOTES
[facility]     Return OB Note     CC: Return OB      Subjective:  Franchesca is a 32 year old  at 33w5d    Denies vaginal bleeding, loss of fluid, or regular contractions. Pos fetal movement. No headache, visual disturbance or RUQ pain.    Objective:  /70 (BP Location: Right arm, Patient Position: Chair, Cuff Size: Adult Regular)   Pulse 71   Temp 97.9  F (36.6  C) (Tympanic)   Wt 89.8 kg (198 lb)   LMP 2022   BMI 39.99 kg/m       FH: 34  Doptones: 140s      Assessment/Plan:     32 year old year old  female with IUP at 33w5d     -First OB labs normal except positive urine culture. Treated. Repeat urine improved.  -Genetic screening: NIPT low risk, normal quad screen  -Anatomy ultrasound WNL  -Mid trimester labs noted for failed GCT, passed GTT  -S/p influenza vaccine 10/12/2022. Recommended COVID moderna vaccine booster. Tdap vaccine given today.  -Obesity: BMI 37: plan for growth US at 32 weeks, weekly BPPs at 37 weeks, ultrasound AC 83%ile.     Return precautions given  Discussed  labor and preeclampsia precautions.  Discussed fetal movement precautions.     RTC 2 weeks    Naomi Moore MD   2023 2:04 PM

## 2023-02-27 ENCOUNTER — ANCILLARY ORDERS (OUTPATIENT)
Dept: OBGYN | Facility: CLINIC | Age: 33
End: 2023-02-27

## 2023-02-27 ENCOUNTER — PRENATAL OFFICE VISIT (OUTPATIENT)
Dept: OBGYN | Facility: CLINIC | Age: 33
End: 2023-02-27
Payer: COMMERCIAL

## 2023-02-27 ENCOUNTER — HOSPITAL ENCOUNTER (OUTPATIENT)
Dept: ULTRASOUND IMAGING | Facility: CLINIC | Age: 33
Discharge: HOME | End: 2023-02-27
Attending: OBSTETRICS & GYNECOLOGY | Admitting: OBSTETRICS & GYNECOLOGY
Payer: COMMERCIAL

## 2023-02-27 VITALS
SYSTOLIC BLOOD PRESSURE: 105 MMHG | WEIGHT: 201.2 LBS | DIASTOLIC BLOOD PRESSURE: 70 MMHG | HEART RATE: 68 BPM | RESPIRATION RATE: 18 BRPM | TEMPERATURE: 97.3 F | BODY MASS INDEX: 40.56 KG/M2 | HEIGHT: 59 IN

## 2023-02-27 DIAGNOSIS — Z34.93 PRENATAL CARE, THIRD TRIMESTER: ICD-10-CM

## 2023-02-27 DIAGNOSIS — E66.9 OBESITY WITHOUT SERIOUS COMORBIDITY, UNSPECIFIED CLASSIFICATION, UNSPECIFIED OBESITY TYPE: ICD-10-CM

## 2023-02-27 DIAGNOSIS — E66.812 CLASS 2 OBESITY WITHOUT SERIOUS COMORBIDITY IN ADULT, UNSPECIFIED BMI, UNSPECIFIED OBESITY TYPE: ICD-10-CM

## 2023-02-27 DIAGNOSIS — Z34.03 ENCOUNTER FOR PRENATAL CARE IN THIRD TRIMESTER OF FIRST PREGNANCY: Primary | ICD-10-CM

## 2023-02-27 PROCEDURE — 87653 STREP B DNA AMP PROBE: CPT | Performed by: OBSTETRICS & GYNECOLOGY

## 2023-02-27 PROCEDURE — 99207 PR PRENATAL VISIT: CPT | Performed by: OBSTETRICS & GYNECOLOGY

## 2023-02-27 PROCEDURE — 76819 FETAL BIOPHYS PROFIL W/O NST: CPT

## 2023-02-27 NOTE — PROGRESS NOTES
"[facility]    Return OB Note    CC: Return OB     Subjective:  Franchesca is a 32 year old  at 35w5d   Denies vaginal bleeding, loss of fluid, or regular contractions. Good fetal movement.  Complaints today: None    Objective:  /70 (BP Location: Left arm, Patient Position: Chair, Cuff Size: Adult Regular)   Pulse 68   Temp 97.3  F (36.3  C) (Tympanic)   Resp 18   Ht 1.499 m (4' 11\")   Wt 91.3 kg (201 lb 3.2 oz)   LMP 2022   BMI 40.64 kg/m      Fundal height: 38cm  FHT: 150bpm  SVE: /3    Assessment/Plan:   Encounter Diagnosis   Name Primary?     Encounter for prenatal care in third trimester of first pregnancy Yes       IUP at 35w5d  -First OB labs normal except positive urine culture. Treated. Repeat urine improved.  -Genetic screening: NIPT low risk, normal quad screen  -Anatomy ultrasound WNL  -Mid trimester labs noted for failed GCT, passed GTT  -Obesity: BMI 37: plan for growth US at 32 and BPP. Growth US today EFW 84%, AC 97%. Discussed risk and complications from shoulder dystocia. Overall weight does not extrapolate out to >5kg.  -GBS collected today  -Strict return precautions given    RTC 1 weeks    Avani Beatty DO    "

## 2023-02-27 NOTE — NURSING NOTE
"Initial /70 (BP Location: Left arm, Patient Position: Chair, Cuff Size: Adult Regular)   Pulse 68   Temp 97.3  F (36.3  C) (Tympanic)   Resp 18   Ht 1.499 m (4' 11\")   Wt 91.3 kg (201 lb 3.2 oz)   LMP 06/22/2022   BMI 40.64 kg/m   Estimated body mass index is 40.64 kg/m  as calculated from the following:    Height as of this encounter: 1.499 m (4' 11\").    Weight as of this encounter: 91.3 kg (201 lb 3.2 oz). .    Prerna Rogers, VIKI    "

## 2023-02-28 LAB — GP B STREP DNA SPEC QL NAA+PROBE: NEGATIVE

## 2023-03-06 ENCOUNTER — PRENATAL OFFICE VISIT (OUTPATIENT)
Dept: OBGYN | Facility: CLINIC | Age: 33
End: 2023-03-06
Payer: COMMERCIAL

## 2023-03-06 ENCOUNTER — HOSPITAL ENCOUNTER (OUTPATIENT)
Dept: ULTRASOUND IMAGING | Facility: CLINIC | Age: 33
Discharge: HOME | End: 2023-03-06
Attending: OBSTETRICS & GYNECOLOGY | Admitting: OBSTETRICS & GYNECOLOGY
Payer: COMMERCIAL

## 2023-03-06 VITALS
DIASTOLIC BLOOD PRESSURE: 70 MMHG | SYSTOLIC BLOOD PRESSURE: 114 MMHG | HEIGHT: 59 IN | BODY MASS INDEX: 41.12 KG/M2 | TEMPERATURE: 97 F | HEART RATE: 66 BPM | WEIGHT: 204 LBS

## 2023-03-06 DIAGNOSIS — E66.9 OBESITY WITHOUT SERIOUS COMORBIDITY, UNSPECIFIED CLASSIFICATION, UNSPECIFIED OBESITY TYPE: ICD-10-CM

## 2023-03-06 DIAGNOSIS — Z34.93 PRENATAL CARE, THIRD TRIMESTER: ICD-10-CM

## 2023-03-06 DIAGNOSIS — Z3A.36 36 WEEKS GESTATION OF PREGNANCY: Primary | ICD-10-CM

## 2023-03-06 PROCEDURE — 76819 FETAL BIOPHYS PROFIL W/O NST: CPT

## 2023-03-06 PROCEDURE — 99207 PR PRENATAL VISIT: CPT | Performed by: OBSTETRICS & GYNECOLOGY

## 2023-03-06 NOTE — PROGRESS NOTES
"[facility]    Return OB Note    CC: Return OB     Subjective:  Franchesca is a 32 year old   at 36w5d   Denies vaginal bleeding, loss of fluid, or regular contractions. Pos fetal movement. No headache, visual disturbance or RUQ pain.  Complaints today: none    Objective:  /70 (BP Location: Right arm, Patient Position: Chair, Cuff Size: Adult Regular)   Pulse 66   Temp 97  F (36.1  C) (Tympanic)   Ht 1.499 m (4' 11\")   Wt 92.5 kg (204 lb)   LMP 2022   BMI 41.20 kg/m      See flowsheet      Assessment/Plan:       32 year old year old  female with IUP at 36w5d  Encounter Diagnosis   Name Primary?     36 weeks gestation of pregnancy Yes       -First OB labs normal except positive urine culture. Treated. Repeat urine improved.  -Genetic screening: NIPT low risk, normal quad screen  -Anatomy ultrasound WNL  -Mid trimester labs noted for failed GCT, passed GTT  -Obesity: BMI 37: plan for growth US at 32 and BPP. Growth US today EFW 84%, AC 97%. Discussed risk and complications from shoulder dystocia. Overall weight does not extrapolate out to >5kg.  -GBS neg     Return precautions given  Discussed labor, rupture of membranes and preeclampsia precautions.  Discussed fetal movement precautions.    RTC 1 weeks    Luzmaria Winston MD  "

## 2023-03-06 NOTE — NURSING NOTE
"Initial /70 (BP Location: Right arm, Patient Position: Chair, Cuff Size: Adult Regular)   Pulse 66   Temp 97  F (36.1  C) (Tympanic)   Ht 1.499 m (4' 11\")   Wt 92.5 kg (204 lb)   LMP 06/22/2022   BMI 41.20 kg/m   Estimated body mass index is 41.2 kg/m  as calculated from the following:    Height as of this encounter: 1.499 m (4' 11\").    Weight as of this encounter: 92.5 kg (204 lb). .    Prerna Rogers, VIKI    "

## 2023-03-13 ENCOUNTER — PRENATAL OFFICE VISIT (OUTPATIENT)
Dept: OBGYN | Facility: CLINIC | Age: 33
End: 2023-03-13
Payer: COMMERCIAL

## 2023-03-13 ENCOUNTER — HOSPITAL ENCOUNTER (OUTPATIENT)
Dept: ULTRASOUND IMAGING | Facility: CLINIC | Age: 33
Discharge: HOME | End: 2023-03-13
Attending: OBSTETRICS & GYNECOLOGY | Admitting: OBSTETRICS & GYNECOLOGY
Payer: COMMERCIAL

## 2023-03-13 VITALS
HEIGHT: 59 IN | DIASTOLIC BLOOD PRESSURE: 71 MMHG | RESPIRATION RATE: 18 BRPM | BODY MASS INDEX: 41.24 KG/M2 | HEART RATE: 70 BPM | SYSTOLIC BLOOD PRESSURE: 108 MMHG | TEMPERATURE: 97 F | WEIGHT: 204.6 LBS

## 2023-03-13 DIAGNOSIS — Z34.03 ENCOUNTER FOR PRENATAL CARE IN THIRD TRIMESTER OF FIRST PREGNANCY: Primary | ICD-10-CM

## 2023-03-13 DIAGNOSIS — Z34.93 PRENATAL CARE, THIRD TRIMESTER: ICD-10-CM

## 2023-03-13 DIAGNOSIS — E66.9 OBESITY WITHOUT SERIOUS COMORBIDITY, UNSPECIFIED CLASSIFICATION, UNSPECIFIED OBESITY TYPE: ICD-10-CM

## 2023-03-13 PROCEDURE — 76819 FETAL BIOPHYS PROFIL W/O NST: CPT

## 2023-03-13 PROCEDURE — 99207 PR PRENATAL VISIT: CPT | Performed by: OBSTETRICS & GYNECOLOGY

## 2023-03-13 RX ORDER — ONDANSETRON 2 MG/ML
4 INJECTION INTRAMUSCULAR; INTRAVENOUS EVERY 6 HOURS PRN
Status: CANCELLED | OUTPATIENT
Start: 2023-03-13

## 2023-03-13 RX ORDER — MISOPROSTOL 200 UG/1
400 TABLET ORAL
Status: CANCELLED | OUTPATIENT
Start: 2023-03-13

## 2023-03-13 RX ORDER — OXYTOCIN 10 [USP'U]/ML
10 INJECTION, SOLUTION INTRAMUSCULAR; INTRAVENOUS
Status: CANCELLED | OUTPATIENT
Start: 2023-03-13

## 2023-03-13 RX ORDER — METOCLOPRAMIDE HYDROCHLORIDE 5 MG/ML
10 INJECTION INTRAMUSCULAR; INTRAVENOUS EVERY 6 HOURS PRN
Status: CANCELLED | OUTPATIENT
Start: 2023-03-13

## 2023-03-13 RX ORDER — TRANEXAMIC ACID 10 MG/ML
1 INJECTION, SOLUTION INTRAVENOUS EVERY 30 MIN PRN
Status: CANCELLED | OUTPATIENT
Start: 2023-03-13

## 2023-03-13 RX ORDER — LIDOCAINE 40 MG/G
CREAM TOPICAL
Status: CANCELLED | OUTPATIENT
Start: 2023-03-13

## 2023-03-13 RX ORDER — NALOXONE HYDROCHLORIDE 0.4 MG/ML
0.2 INJECTION, SOLUTION INTRAMUSCULAR; INTRAVENOUS; SUBCUTANEOUS
Status: CANCELLED | OUTPATIENT
Start: 2023-03-13

## 2023-03-13 RX ORDER — IBUPROFEN 200 MG
800 TABLET ORAL
Status: CANCELLED | OUTPATIENT
Start: 2023-03-13 | End: 2023-03-18

## 2023-03-13 RX ORDER — NALOXONE HYDROCHLORIDE 0.4 MG/ML
0.4 INJECTION, SOLUTION INTRAMUSCULAR; INTRAVENOUS; SUBCUTANEOUS
Status: CANCELLED | OUTPATIENT
Start: 2023-03-13

## 2023-03-13 RX ORDER — MISOPROSTOL 200 UG/1
800 TABLET ORAL
Status: CANCELLED | OUTPATIENT
Start: 2023-03-13

## 2023-03-13 RX ORDER — OXYCODONE HYDROCHLORIDE 5 MG/1
5 TABLET ORAL
Status: CANCELLED | OUTPATIENT
Start: 2023-03-13

## 2023-03-13 RX ORDER — CARBOPROST TROMETHAMINE 250 UG/ML
250 INJECTION, SOLUTION INTRAMUSCULAR
Status: CANCELLED | OUTPATIENT
Start: 2023-03-13

## 2023-03-13 RX ORDER — METHYLERGONOVINE MALEATE 0.2 MG/ML
200 INJECTION INTRAVENOUS
Status: CANCELLED | OUTPATIENT
Start: 2023-03-13

## 2023-03-13 RX ORDER — OXYTOCIN/0.9 % SODIUM CHLORIDE 30/500 ML
100-340 PLASTIC BAG, INJECTION (ML) INTRAVENOUS CONTINUOUS PRN
Status: CANCELLED | OUTPATIENT
Start: 2023-03-13

## 2023-03-13 RX ORDER — PROCHLORPERAZINE MALEATE 10 MG
10 TABLET ORAL EVERY 6 HOURS PRN
Status: CANCELLED | OUTPATIENT
Start: 2023-03-13

## 2023-03-13 RX ORDER — OXYTOCIN/0.9 % SODIUM CHLORIDE 30/500 ML
340 PLASTIC BAG, INJECTION (ML) INTRAVENOUS CONTINUOUS PRN
Status: CANCELLED | OUTPATIENT
Start: 2023-03-13

## 2023-03-13 RX ORDER — FENTANYL CITRATE 50 UG/ML
100 INJECTION, SOLUTION INTRAMUSCULAR; INTRAVENOUS
Status: CANCELLED | OUTPATIENT
Start: 2023-03-13

## 2023-03-13 RX ORDER — MISOPROSTOL 100 UG/1
25 TABLET ORAL
Status: CANCELLED | OUTPATIENT
Start: 2023-03-13

## 2023-03-13 RX ORDER — MORPHINE SULFATE 10 MG/ML
10 INJECTION, SOLUTION INTRAMUSCULAR; INTRAVENOUS
Status: CANCELLED | OUTPATIENT
Start: 2023-03-13

## 2023-03-13 RX ORDER — ONDANSETRON 4 MG/1
4 TABLET, ORALLY DISINTEGRATING ORAL EVERY 6 HOURS PRN
Status: CANCELLED | OUTPATIENT
Start: 2023-03-13

## 2023-03-13 RX ORDER — METOCLOPRAMIDE 10 MG/1
10 TABLET ORAL EVERY 6 HOURS PRN
Status: CANCELLED | OUTPATIENT
Start: 2023-03-13

## 2023-03-13 RX ORDER — PROCHLORPERAZINE 25 MG
25 SUPPOSITORY, RECTAL RECTAL EVERY 12 HOURS PRN
Status: CANCELLED | OUTPATIENT
Start: 2023-03-13

## 2023-03-13 RX ORDER — KETOROLAC TROMETHAMINE 30 MG/ML
30 INJECTION, SOLUTION INTRAMUSCULAR; INTRAVENOUS
Status: CANCELLED | OUTPATIENT
Start: 2023-03-13 | End: 2023-03-18

## 2023-03-13 RX ORDER — HYDROXYZINE HYDROCHLORIDE 50 MG/1
50 TABLET, FILM COATED ORAL
Status: CANCELLED | OUTPATIENT
Start: 2023-03-13

## 2023-03-13 RX ORDER — CITRIC ACID/SODIUM CITRATE 334-500MG
30 SOLUTION, ORAL ORAL
Status: CANCELLED | OUTPATIENT
Start: 2023-03-13

## 2023-03-13 NOTE — NURSING NOTE
"Initial /71 (BP Location: Left arm, Patient Position: Chair, Cuff Size: Adult Regular)   Pulse 70   Temp 97  F (36.1  C) (Tympanic)   Resp 18   Ht 1.499 m (4' 11\")   Wt 92.8 kg (204 lb 9.6 oz)   LMP 06/22/2022   BMI 41.32 kg/m   Estimated body mass index is 41.32 kg/m  as calculated from the following:    Height as of this encounter: 1.499 m (4' 11\").    Weight as of this encounter: 92.8 kg (204 lb 9.6 oz). .    Prerna Rogers, VIKI    "

## 2023-03-13 NOTE — PROGRESS NOTES
"[facility]     Return OB Note     CC: Return OB      Subjective:  Franchesca is a 32 year old   at 37w5d   Denies vaginal bleeding, loss of fluid, or regular contractions. Pos fetal movement.   Complaints today: none     Objective:  /71 (BP Location: Left arm, Patient Position: Chair, Cuff Size: Adult Regular)   Pulse 70   Temp 97  F (36.1  C) (Tympanic)   Resp 18   Ht 1.499 m (4' 11\")   Wt 92.8 kg (204 lb 9.6 oz)   LMP 2022   BMI 41.32 kg/m      FHR: 144 bpm  Fundal height: 38 cm        Assessment/Plan:         32 year old year old  female with IUP at 37w5d       Encounter Diagnosis   Name Primary?     36 weeks gestation of pregnancy Yes         -First OB labs normal except positive urine culture. Treated. Repeat urine improved.  -Genetic screening: NIPT low risk, normal quad screen  -Anatomy ultrasound WNL  -Mid trimester labs noted for failed GCT, passed GTT  -Obesity: BMI 37: plan for growth US at 32 and BPP. Growth US today EFW 84%, AC 97%. Discussed risk and complications from shoulder dystocia. Overall weight does not extrapolate out to >5kg. Plan for IOL at 39 wks. Discussed steps of cervical ripening and then pitocin once favorable.   -GBS neg  -Interested in induction at 39 weeks      Return precautions given  Discussed labor, rupture of membranes and preeclampsia precautions.  Discussed fetal movement precautions.     RTC 1 weeks     Marybeth Maurice NP student on 3/13/2023 at 2:34 PM    I agree with the NP student's documentation above and have edited it for accuracy. I was present for an performed the physical exam and interview of the patient myself.     Stefany Rivers MD  OB/GYN     "

## 2023-03-13 NOTE — Clinical Note
Orders placed for cervical ripening on 3/21 with miso for BMI >40. 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  KG

## 2023-03-20 ENCOUNTER — TELEPHONE (OUTPATIENT)
Dept: OBGYN | Facility: CLINIC | Age: 33
End: 2023-03-20

## 2023-03-20 ENCOUNTER — HOSPITAL ENCOUNTER (OUTPATIENT)
Dept: ULTRASOUND IMAGING | Facility: CLINIC | Age: 33
Discharge: HOME | End: 2023-03-20
Attending: OBSTETRICS & GYNECOLOGY | Admitting: OBSTETRICS & GYNECOLOGY
Payer: COMMERCIAL

## 2023-03-20 ENCOUNTER — PRENATAL OFFICE VISIT (OUTPATIENT)
Dept: OBGYN | Facility: CLINIC | Age: 33
End: 2023-03-20
Payer: COMMERCIAL

## 2023-03-20 VITALS
HEART RATE: 67 BPM | WEIGHT: 208 LBS | DIASTOLIC BLOOD PRESSURE: 78 MMHG | TEMPERATURE: 98.7 F | SYSTOLIC BLOOD PRESSURE: 114 MMHG | HEIGHT: 59 IN | RESPIRATION RATE: 16 BRPM | BODY MASS INDEX: 41.93 KG/M2

## 2023-03-20 DIAGNOSIS — E66.9 OBESITY WITHOUT SERIOUS COMORBIDITY, UNSPECIFIED CLASSIFICATION, UNSPECIFIED OBESITY TYPE: ICD-10-CM

## 2023-03-20 DIAGNOSIS — Z34.93 PRENATAL CARE, THIRD TRIMESTER: ICD-10-CM

## 2023-03-20 DIAGNOSIS — Z3A.38 38 WEEKS GESTATION OF PREGNANCY: Primary | ICD-10-CM

## 2023-03-20 PROCEDURE — 99207 PR PRENATAL VISIT: CPT | Performed by: OBSTETRICS & GYNECOLOGY

## 2023-03-20 PROCEDURE — 76819 FETAL BIOPHYS PROFIL W/O NST: CPT

## 2023-03-20 NOTE — NURSING NOTE
"Initial /78 (BP Location: Right arm, Patient Position: Chair, Cuff Size: Adult Regular)   Pulse 67   Temp 98.7  F (37.1  C) (Tympanic)   Resp 16   Ht 1.499 m (4' 11\")   Wt 94.3 kg (208 lb)   LMP 06/22/2022   BMI 42.01 kg/m   Estimated body mass index is 42.01 kg/m  as calculated from the following:    Height as of this encounter: 1.499 m (4' 11\").    Weight as of this encounter: 94.3 kg (208 lb). .      "

## 2023-03-20 NOTE — PROGRESS NOTES
"[facility]    Return OB Note     CC: Return OB     Subjective:  Franchesca is a 32 year old   at 38w5d   Denies vaginal bleeding, loss of fluid, or regular contractions. Pos fetal movement. No headache, visual disturbance or RUQ pain.  Complaints today: none.  Has an induction scheduled.    Objective:  /78 (BP Location: Right arm, Patient Position: Chair, Cuff Size: Adult Regular)   Pulse 67   Temp 98.7  F (37.1  C) (Tympanic)   Resp 16   Ht 1.499 m (4' 11\")   Wt 94.3 kg (208 lb)   LMP 2022   BMI 42.01 kg/m      See flowsheet      Assessment/Plan:       32 year old year old  female with IUP at 38w5d  Encounter Diagnosis   Name Primary?     38 weeks gestation of pregnancy Yes          -First OB labs normal except positive urine culture. Treated. Repeat urine improved.  -Genetic screening: NIPT low risk, normal quad screen  -Anatomy ultrasound WNL  -Mid trimester labs noted for failed GCT, passed GTT  -Obesity: BMI 37: plan for growth US at 32 and BPP. Growth US today EFW 84%, AC 97%. Discussed risk and complications from shoulder dystocia. Overall weight does not extrapolate out to >5kg. Plan for IOL at 39 wks. Discussed steps of cervical ripening and then pitocin once favorable.   -GBS neg  -Interested in induction at 39 weeks, has induction scheduled tomorrow. Discussed coming in tomorrow evening for cervical ripening.  Will plan oral cytotec.    Return precautions given  Discussed labor, rupture of membranes and preeclampsia precautions.  Discussed fetal movement precautions.    RTC 1 weeks    Luzmaria Winston MD  "

## 2023-03-21 ENCOUNTER — NURSE TRIAGE (OUTPATIENT)
Dept: NURSING | Facility: CLINIC | Age: 33
End: 2023-03-21

## 2023-03-21 ENCOUNTER — HOSPITAL ENCOUNTER (INPATIENT)
Facility: CLINIC | Age: 33
LOS: 6 days | Discharge: HOME | End: 2023-03-27
Attending: OBSTETRICS & GYNECOLOGY | Admitting: OBSTETRICS & GYNECOLOGY
Payer: COMMERCIAL

## 2023-03-21 DIAGNOSIS — Z98.891 S/P CESAREAN SECTION: ICD-10-CM

## 2023-03-21 DIAGNOSIS — D62 ANEMIA DUE TO BLOOD LOSS, ACUTE: ICD-10-CM

## 2023-03-21 DIAGNOSIS — Z41.9 ELECTIVE SURGERY: Primary | ICD-10-CM

## 2023-03-21 PROBLEM — Z34.90 ENCOUNTER FOR PLANNED INDUCTION OF LABOR: Status: ACTIVE | Noted: 2023-03-21

## 2023-03-21 LAB
ABO/RH(D): NORMAL
ANTIBODY SCREEN: NEGATIVE
ERYTHROCYTE [DISTWIDTH] IN BLOOD BY AUTOMATED COUNT: 13.2 % (ref 10–15)
HCT VFR BLD AUTO: 31.8 % (ref 35–47)
HGB BLD-MCNC: 10.9 G/DL (ref 11.7–15.7)
MCH RBC QN AUTO: 31.8 PG (ref 26.5–33)
MCHC RBC AUTO-ENTMCNC: 34.3 G/DL (ref 31.5–36.5)
MCV RBC AUTO: 93 FL (ref 78–100)
PLATELET # BLD AUTO: 198 10E3/UL (ref 150–450)
RBC # BLD AUTO: 3.43 10E6/UL (ref 3.8–5.2)
SPECIMEN EXPIRATION DATE: NORMAL
WBC # BLD AUTO: 9 10E3/UL (ref 4–11)

## 2023-03-21 PROCEDURE — 250N000013 HC RX MED GY IP 250 OP 250 PS 637: Performed by: OBSTETRICS & GYNECOLOGY

## 2023-03-21 PROCEDURE — 86901 BLOOD TYPING SEROLOGIC RH(D): CPT | Performed by: OBSTETRICS & GYNECOLOGY

## 2023-03-21 PROCEDURE — 85014 HEMATOCRIT: CPT | Performed by: OBSTETRICS & GYNECOLOGY

## 2023-03-21 PROCEDURE — 86780 TREPONEMA PALLIDUM: CPT | Performed by: OBSTETRICS & GYNECOLOGY

## 2023-03-21 PROCEDURE — 120N000001 HC R&B MED SURG/OB

## 2023-03-21 PROCEDURE — 86850 RBC ANTIBODY SCREEN: CPT | Performed by: OBSTETRICS & GYNECOLOGY

## 2023-03-21 PROCEDURE — 99221 1ST HOSP IP/OBS SF/LOW 40: CPT | Performed by: OBSTETRICS & GYNECOLOGY

## 2023-03-21 RX ORDER — LIDOCAINE 40 MG/G
CREAM TOPICAL
Status: DISCONTINUED | OUTPATIENT
Start: 2023-03-21 | End: 2023-03-25 | Stop reason: HOSPADM

## 2023-03-21 RX ORDER — NALOXONE HYDROCHLORIDE 0.4 MG/ML
0.4 INJECTION, SOLUTION INTRAMUSCULAR; INTRAVENOUS; SUBCUTANEOUS
Status: DISCONTINUED | OUTPATIENT
Start: 2023-03-21 | End: 2023-03-25 | Stop reason: HOSPADM

## 2023-03-21 RX ORDER — PROCHLORPERAZINE 25 MG
25 SUPPOSITORY, RECTAL RECTAL EVERY 12 HOURS PRN
Status: DISCONTINUED | OUTPATIENT
Start: 2023-03-21 | End: 2023-03-25 | Stop reason: HOSPADM

## 2023-03-21 RX ORDER — IBUPROFEN 800 MG/1
800 TABLET, FILM COATED ORAL
Status: DISPENSED | OUTPATIENT
Start: 2023-03-21 | End: 2023-03-26

## 2023-03-21 RX ORDER — METOCLOPRAMIDE 10 MG/1
10 TABLET ORAL EVERY 6 HOURS PRN
Status: DISCONTINUED | OUTPATIENT
Start: 2023-03-21 | End: 2023-03-25 | Stop reason: HOSPADM

## 2023-03-21 RX ORDER — KETOROLAC TROMETHAMINE 30 MG/ML
30 INJECTION, SOLUTION INTRAMUSCULAR; INTRAVENOUS
Status: ACTIVE | OUTPATIENT
Start: 2023-03-21 | End: 2023-03-26

## 2023-03-21 RX ORDER — NALOXONE HYDROCHLORIDE 0.4 MG/ML
0.2 INJECTION, SOLUTION INTRAMUSCULAR; INTRAVENOUS; SUBCUTANEOUS
Status: DISCONTINUED | OUTPATIENT
Start: 2023-03-21 | End: 2023-03-25 | Stop reason: HOSPADM

## 2023-03-21 RX ORDER — MISOPROSTOL 100 UG/1
25 TABLET ORAL
Status: COMPLETED | OUTPATIENT
Start: 2023-03-21 | End: 2023-03-22

## 2023-03-21 RX ORDER — CITRIC ACID/SODIUM CITRATE 334-500MG
30 SOLUTION, ORAL ORAL
Status: DISCONTINUED | OUTPATIENT
Start: 2023-03-21 | End: 2023-03-25 | Stop reason: HOSPADM

## 2023-03-21 RX ORDER — CARBOPROST TROMETHAMINE 250 UG/ML
250 INJECTION, SOLUTION INTRAMUSCULAR
Status: DISCONTINUED | OUTPATIENT
Start: 2023-03-21 | End: 2023-03-25 | Stop reason: HOSPADM

## 2023-03-21 RX ORDER — OXYTOCIN 10 [USP'U]/ML
10 INJECTION, SOLUTION INTRAMUSCULAR; INTRAVENOUS
Status: DISCONTINUED | OUTPATIENT
Start: 2023-03-21 | End: 2023-03-25 | Stop reason: HOSPADM

## 2023-03-21 RX ORDER — FENTANYL CITRATE 50 UG/ML
100 INJECTION, SOLUTION INTRAMUSCULAR; INTRAVENOUS
Status: DISCONTINUED | OUTPATIENT
Start: 2023-03-21 | End: 2023-03-25 | Stop reason: HOSPADM

## 2023-03-21 RX ORDER — ONDANSETRON 2 MG/ML
4 INJECTION INTRAMUSCULAR; INTRAVENOUS EVERY 6 HOURS PRN
Status: DISCONTINUED | OUTPATIENT
Start: 2023-03-21 | End: 2023-03-25 | Stop reason: HOSPADM

## 2023-03-21 RX ORDER — OXYTOCIN/0.9 % SODIUM CHLORIDE 30/500 ML
100-340 PLASTIC BAG, INJECTION (ML) INTRAVENOUS CONTINUOUS PRN
Status: DISCONTINUED | OUTPATIENT
Start: 2023-03-21 | End: 2023-03-27 | Stop reason: HOSPADM

## 2023-03-21 RX ORDER — METHYLERGONOVINE MALEATE 0.2 MG/ML
200 INJECTION INTRAVENOUS
Status: DISCONTINUED | OUTPATIENT
Start: 2023-03-21 | End: 2023-03-25 | Stop reason: HOSPADM

## 2023-03-21 RX ORDER — ONDANSETRON 4 MG/1
4 TABLET, ORALLY DISINTEGRATING ORAL EVERY 6 HOURS PRN
Status: DISCONTINUED | OUTPATIENT
Start: 2023-03-21 | End: 2023-03-25 | Stop reason: HOSPADM

## 2023-03-21 RX ORDER — MISOPROSTOL 200 UG/1
800 TABLET ORAL
Status: DISCONTINUED | OUTPATIENT
Start: 2023-03-21 | End: 2023-03-25 | Stop reason: HOSPADM

## 2023-03-21 RX ORDER — OXYTOCIN 10 [USP'U]/ML
10 INJECTION, SOLUTION INTRAMUSCULAR; INTRAVENOUS
Status: DISCONTINUED | OUTPATIENT
Start: 2023-03-21 | End: 2023-03-27 | Stop reason: HOSPADM

## 2023-03-21 RX ORDER — PROCHLORPERAZINE MALEATE 10 MG
10 TABLET ORAL EVERY 6 HOURS PRN
Status: DISCONTINUED | OUTPATIENT
Start: 2023-03-21 | End: 2023-03-25 | Stop reason: HOSPADM

## 2023-03-21 RX ORDER — MORPHINE SULFATE 10 MG/ML
10 INJECTION, SOLUTION INTRAMUSCULAR; INTRAVENOUS
Status: COMPLETED | OUTPATIENT
Start: 2023-03-21 | End: 2023-03-23

## 2023-03-21 RX ORDER — HYDROXYZINE HYDROCHLORIDE 50 MG/1
50 TABLET, FILM COATED ORAL
Status: DISCONTINUED | OUTPATIENT
Start: 2023-03-21 | End: 2023-03-25 | Stop reason: HOSPADM

## 2023-03-21 RX ORDER — TRANEXAMIC ACID 10 MG/ML
1 INJECTION, SOLUTION INTRAVENOUS EVERY 30 MIN PRN
Status: DISCONTINUED | OUTPATIENT
Start: 2023-03-21 | End: 2023-03-25 | Stop reason: HOSPADM

## 2023-03-21 RX ORDER — METOCLOPRAMIDE HYDROCHLORIDE 5 MG/ML
10 INJECTION INTRAMUSCULAR; INTRAVENOUS EVERY 6 HOURS PRN
Status: DISCONTINUED | OUTPATIENT
Start: 2023-03-21 | End: 2023-03-25 | Stop reason: HOSPADM

## 2023-03-21 RX ORDER — OXYTOCIN/0.9 % SODIUM CHLORIDE 30/500 ML
340 PLASTIC BAG, INJECTION (ML) INTRAVENOUS CONTINUOUS PRN
Status: DISCONTINUED | OUTPATIENT
Start: 2023-03-21 | End: 2023-03-25 | Stop reason: HOSPADM

## 2023-03-21 RX ORDER — MISOPROSTOL 200 UG/1
400 TABLET ORAL
Status: DISCONTINUED | OUTPATIENT
Start: 2023-03-21 | End: 2023-03-25 | Stop reason: HOSPADM

## 2023-03-21 RX ORDER — OXYCODONE HYDROCHLORIDE 5 MG/1
5 TABLET ORAL
Status: DISCONTINUED | OUTPATIENT
Start: 2023-03-21 | End: 2023-03-27 | Stop reason: HOSPADM

## 2023-03-21 RX ADMIN — MISOPROSTOL 25 MCG: 100 TABLET ORAL at 20:02

## 2023-03-21 RX ADMIN — MISOPROSTOL 25 MCG: 100 TABLET ORAL at 22:11

## 2023-03-21 ASSESSMENT — ACTIVITIES OF DAILY LIVING (ADL)
ADLS_ACUITY_SCORE: 18
ADLS_ACUITY_SCORE: 18

## 2023-03-21 NOTE — TELEPHONE ENCOUNTER
OB Triage Call    Is patient's OB/Midwife with the [facility] or [facility]? [facility]- Proceed with triage     Reason for call: pre-Induction check-in    Assessment: Franchesca is 38w6d pregnant    She is scheduled for an induction tonight at 7 pm.  She was [facility] contact [facility] 1 hour prior.    Plan: L&D    Patient plans to deliver at [facility]    Patient's primary OB Provider is [facility].      Per protocol recommendations Patient to be evaluated in L&D. Patient's primary OB is [facility].  Labor and delivery at [facility] ([phone]) notified of patient's pending arrival.  Warm tx'd to COURTNEY Cutler @ 6:12 pm.    Is patient's delivering hospital on divert? No      38w6d    Estimated Date of Delivery: Mar 29, 2023        OB History    Para Term  AB Living   1 0 0 0 0 0   SAB IAB Ectopic Multiple Live Births   0 0 0 0 0      # Outcome Date GA Lbr Bharat/2nd Weight Sex Delivery Anes PTL Lv   1 Current                No results found for: GBS       Maci Hooks, RN  [facility]      Reason for Disposition    Nursing judgment    Protocols used: NO GUIDELINE OR REFERENCE [id number]

## 2023-03-22 LAB — T PALLIDUM AB SER QL: NONREACTIVE

## 2023-03-22 PROCEDURE — 250N000013 HC RX MED GY IP 250 OP 250 PS 637: Performed by: OBSTETRICS & GYNECOLOGY

## 2023-03-22 PROCEDURE — 3E0P7VZ INTRODUCTION OF HORMONE INTO FEMALE REPRODUCTIVE, VIA NATURAL OR ARTIFICIAL OPENING: ICD-10-PCS | Performed by: OBSTETRICS & GYNECOLOGY

## 2023-03-22 PROCEDURE — 99231 SBSQ HOSP IP/OBS SF/LOW 25: CPT | Performed by: OBSTETRICS & GYNECOLOGY

## 2023-03-22 PROCEDURE — 120N000001 HC R&B MED SURG/OB

## 2023-03-22 RX ADMIN — MISOPROSTOL 25 MCG: 100 TABLET ORAL at 14:04

## 2023-03-22 RX ADMIN — MISOPROSTOL 25 MCG: 100 TABLET ORAL at 00:08

## 2023-03-22 RX ADMIN — MISOPROSTOL 25 MCG: 100 TABLET ORAL at 12:06

## 2023-03-22 RX ADMIN — MISOPROSTOL 25 MCG: 100 TABLET ORAL at 06:11

## 2023-03-22 RX ADMIN — DINOPROSTONE 10 MG: 10 INSERT VAGINAL at 20:51

## 2023-03-22 RX ADMIN — MISOPROSTOL 25 MCG: 100 TABLET ORAL at 10:08

## 2023-03-22 RX ADMIN — MISOPROSTOL 25 MCG: 100 TABLET ORAL at 08:10

## 2023-03-22 RX ADMIN — MISOPROSTOL 25 MCG: 100 TABLET ORAL at 02:00

## 2023-03-22 RX ADMIN — MISOPROSTOL 25 MCG: 100 TABLET ORAL at 18:10

## 2023-03-22 RX ADMIN — MISOPROSTOL 25 MCG: 100 TABLET ORAL at 04:00

## 2023-03-22 RX ADMIN — MISOPROSTOL 25 MCG: 100 TABLET ORAL at 16:09

## 2023-03-22 ASSESSMENT — ACTIVITIES OF DAILY LIVING (ADL)
ADLS_ACUITY_SCORE: 18

## 2023-03-22 NOTE — PROGRESS NOTES
1900 - pt arrived to [facility] for IOL.   1930 - pt admitted and POC reviewed. All questions answered at this time. IV placed.   2000 - SVE 0/50/-3. miso administered for cervical ripening. Pt up walking halls.   2020 to 2035 pt walked off unit. Pt educated on use of monitors and range. Pt verbalized understanding.   2050 - pt utilizing ball movements  2140 - pt in bed, lanny placed  2210 - miso administered  2220 - pt up using ball.   2315 - Lanny not having good signal service. Pods replaced, blue tooth and wifi turned off from phones in room, pt changed positioned, device restarted. Signal continues to go in and out.   0005 - miso administered  0045 - pt back to bed.  0200 - miso administered  0230 - pt up using ball.  0400 - pt back to bed. miso administered  0605 - miso administered    Report given to COURTNEY Muñiz. All questions answered at this time.

## 2023-03-22 NOTE — PROGRESS NOTES
S: Admit to Inpatient   A: A:moderate variablility, + accels, no decels, Category I  normal uterine activity  closed,posterior and effaced 30-50%  Membrane status; intact  Admission on 03/21/2023   Component Date Value     WBC Count 03/21/2023 9.0      RBC Count 03/21/2023 3.43 (L)      Hemoglobin 03/21/2023 10.9 (L)      Hematocrit 03/21/2023 31.8 (L)      MCV 03/21/2023 93      MCH 03/21/2023 31.8      MCHC 03/21/2023 34.3      RDW 03/21/2023 13.2      Platelet Count 03/21/2023 198      ABO/RH(D) 03/21/2023 B POS      Antibody Screen 03/21/2023 Negative      SPECIMEN EXPIRATION DATE 03/21/2023 [id number]      Dr. KRISTIE Harp informed of above and admission orders received.   R: Plan of care reviewed with patient. Oriented to room. Reviewed resource binder, [facility] book and paperwork to complete.

## 2023-03-22 NOTE — PROGRESS NOTES
Labor Progress Note:  S: having to breath through the contractions. No LOF or VB.   O:   VS: /89 (BP Location: Right arm)   Pulse 68   Temp 98.1  F (36.7  C) (Oral)   Resp 16   LMP 2022   Crx: c/50/-3, posterior and medium   FHT: baseline 120, mod veronique, +accels, no decels  [place]: 2-3 in 10 min    A/P: 31 yo  at 39w0d for IOL for BMI >40  Labor: cervical ripening, plan 24hrs miso then +/- cervidil if needed, pitocin once favoravle.   Cat 1 reactive, con FM    Stefany Rivers MD  OB/GYN

## 2023-03-22 NOTE — H&P
Admit H&P      HPI:  Franchesca Bowen, 32 year old,  at 38w6d presents for cervical ripening with planned induction of labor tomorrow for BMI over 40.  Denies contractions, bleeding or leakage of fluid.  Baby has been active.  No headaches, visual disturbance or RUQ pain      History reviewed. No pertinent past medical history.  Patient Active Problem List   Diagnosis     Female infertility     Prenatal care, first pregnancy     Class 2 obesity without serious comorbidity in adult     Past Surgical History:   Procedure Laterality Date     MYRINGOTOMY, INSERT TUBE BILATERAL, COMBINED       MI REMOVE TONSILS/ADENOIDS,<13 Y/O      age 9     Social History     Socioeconomic History     Marital status:      Spouse name: Not on file     Number of children: Not on file     Years of education: Not on file     Highest education level: Not on file   Occupational History     Not on file   Tobacco Use     Smoking status: Former     Packs/day: 0.50     Years: 7.00     Pack years: 3.50     Types: Cigarettes     Quit date: 2017     Years since quittin.8     Smokeless tobacco: Never   Vaping Use     Vaping Use: Never used   Substance and Sexual Activity     Alcohol use: Not Currently     Drug use: Never     Sexual activity: Yes     Partners: Male   Other Topics Concern     Not on file   Social History Narrative     Not on file     Social Determinants of Health     Financial Resource Strain: Not on file   Food Insecurity: Not on file   Transportation Needs: Not on file   Physical Activity: Not on file   Stress: Not on file   Social Connections: Not on file   Intimate Partner Violence: Not on file   Housing Stability: Not on file     Family History   Problem Relation Age of Onset     Crohn's Disease Father         No current outpatient medications on file.        Allergies:   No Known Allergies     ROS:  See HPI      Physical Exam:  /73 (BP Location: Right arm, Patient Position: Semi-Em's, Cuff  Size: Adult Regular)   Pulse 73   Temp 98.6  F (37  C) (Oral)   Resp 16   LMP 2022      General appearance: well-hydrated, A&O x 3, no apparent distress  Neck: Appropriate symmetry, thyroid not enlarged  Lungs: Equal expansion bilaterally, no accessory muscle use  Heart: No heaves or thrills. No peripheral varicosities  Skin: No rash, lesions, ulcers  Constitutional: See vitals  Abdomen: Soft, non-tender, gravid  Extremities: trace edema, no calf tenderness  Neuro: CN II-XII grossly intact  SVE: closed in office yesterday    FHTs: 130's, positive variability, positive accels, no decels  [place]: q 3-4 min, not feeling        Assessment/Plan:  32 year old  at 38w6d   Category 1 FHR tracing  Cervical ripening with misoprostol tonight with planned induction tomorrow due to BMI > 40.  B+, RI, GBS neg.        Luzmaria Winston MD on 3/21/2023 at 7:22 PM

## 2023-03-23 PROCEDURE — 250N000013 HC RX MED GY IP 250 OP 250 PS 637: Performed by: OBSTETRICS & GYNECOLOGY

## 2023-03-23 PROCEDURE — 120N000001 HC R&B MED SURG/OB

## 2023-03-23 PROCEDURE — 250N000011 HC RX IP 250 OP 636: Performed by: OBSTETRICS & GYNECOLOGY

## 2023-03-23 RX ADMIN — DINOPROSTONE 10 MG: 10 INSERT VAGINAL at 11:04

## 2023-03-23 RX ADMIN — HYDROXYZINE HYDROCHLORIDE 50 MG: 50 TABLET, FILM COATED ORAL at 02:05

## 2023-03-23 RX ADMIN — ONDANSETRON 4 MG: 2 INJECTION INTRAMUSCULAR; INTRAVENOUS at 06:31

## 2023-03-23 RX ADMIN — MORPHINE SULFATE 10 MG: 10 INJECTION, SOLUTION INTRAMUSCULAR; INTRAVENOUS at 02:05

## 2023-03-23 ASSESSMENT — ACTIVITIES OF DAILY LIVING (ADL)
ADLS_ACUITY_SCORE: 18

## 2023-03-23 NOTE — PLAN OF CARE
Pt continues to have category on FHS with no decel's and marcelo every 2.5- 5 minutes.  SVE was unchanged 0/50/-3.  Dr. Stefany Rivers notified and plan to start cervidil, orders received.  VSS, and pt up independently.  Pt encouraged to move frequently, and instructed on optimal positions including side lunges, and use of birthing ball.  Pt states pain is tolerable, but would like an epidural later when pain gets intolerable.   at bedside and a good support.

## 2023-03-23 NOTE — PLAN OF CARE
Pt continues to have category 1 fetal heart strip.  Cervidil inserted vaginally by Writer at 2051.  Pt has been tolerating well.

## 2023-03-23 NOTE — PLAN OF CARE
Goal Outcome Evaluation:    Pt reports pain a zero at this time, not feeling contractions.    Cervidil removed at 0850, Dr Lowe at bedside to assess and perform SVE to determine POC.   SVE: 1/70/-2 mid position, medium consistency.   Alejandro changed from a 2 to a 7.  POC insert another cervidil in 2 hours at 1100.   Pt up in room and hallway independently.   Questions answered, verbalized understanding of POC.   Will continue to assess and monitor

## 2023-03-23 NOTE — PLAN OF CARE
Category 1 fetal heart strip.  Pt requested something to help her sleep, as she was not able to sleep through contractions.  Morphine IM and atarax po given at 0205.  Pt then instructed through position changes from 1951-5235 starting with extended mountain pose, then hands and knees with CUB pillow and shake the apples, and bilateral side line release performed by RN.  Pt tolerated well, and repositioned on L. Side with pillows.

## 2023-03-23 NOTE — PLAN OF CARE
Continues to have category 1 fetal heart strip, VSS and pt is resting comfortably.  Pt ambulated through halls between 2050- and 2110, and was active using birthing ball in room through the evening.   has been attentive and is a good support.  Pt went to bed to rest at 0100, while  went home to tend to pets.

## 2023-03-23 NOTE — PLAN OF CARE
0630- Pt states contractions are getting stronger, and feeling them in lower back, but declines pain medication at this time.  Pt also reports nausea.  Zofran given. SVE performed but writer unable to reach cervix due to pt discomfort. Station still - 3.   Cervidil still intact.       Pt encouraged to get up, and went to bathroom, and then repositioned standing and leaning forward on elbows with bed elevated.  shown how to do counter hip pressure with contractions.  Discussed epidural, and option to have it placed and not dosed, but pt declines at this time.

## 2023-03-23 NOTE — PLAN OF CARE
Pt continues to have category 1 fetal heart tracing.  Pt has had some periods of minimal variability, but no decels.  Pt repositioned independently , and encouraged to drink plenty of fluids.  Pt resting, and tolerating contractions at this time, declines any pain intervention.

## 2023-03-23 NOTE — PLAN OF CARE
Goal Outcome Evaluation:    Pt prime induction of labor for BMI of 42. Cat 1 tracing, periods of minimal variability after IM vistaril and MS.  Pt up in room inde. Voiding adequate amounts. IV SL, tolerating regular diet.  present and attentive. Dr Lowe updated, SVE no change, H/T/L. 0/50/-3 per report. Cervidil will be removed at 0850.

## 2023-03-24 ENCOUNTER — ANESTHESIA (OUTPATIENT)
Dept: SURGERY | Facility: CLINIC | Age: 33
End: 2023-03-24
Payer: COMMERCIAL

## 2023-03-24 ENCOUNTER — ANESTHESIA EVENT (OUTPATIENT)
Dept: SURGERY | Facility: CLINIC | Age: 33
End: 2023-03-24
Payer: COMMERCIAL

## 2023-03-24 PROCEDURE — 250N000011 HC RX IP 250 OP 636: Performed by: OBSTETRICS & GYNECOLOGY

## 2023-03-24 PROCEDURE — 3E0R3BZ INTRODUCTION OF ANESTHETIC AGENT INTO SPINAL CANAL, PERCUTANEOUS APPROACH: ICD-10-PCS | Performed by: OBSTETRICS & GYNECOLOGY

## 2023-03-24 PROCEDURE — 272N000001 HC OR GENERAL SUPPLY STERILE: Performed by: OBSTETRICS & GYNECOLOGY

## 2023-03-24 PROCEDURE — 710N000010 HC RECOVERY PHASE 1, LEVEL 2, PER MIN: Performed by: OBSTETRICS & GYNECOLOGY

## 2023-03-24 PROCEDURE — 258N000003 HC RX IP 258 OP 636: Performed by: OBSTETRICS & GYNECOLOGY

## 2023-03-24 PROCEDURE — 250N000009 HC RX 250: Performed by: NURSE ANESTHETIST, CERTIFIED REGISTERED

## 2023-03-24 PROCEDURE — 00HU33Z INSERTION OF INFUSION DEVICE INTO SPINAL CANAL, PERCUTANEOUS APPROACH: ICD-10-PCS | Performed by: OBSTETRICS & GYNECOLOGY

## 2023-03-24 PROCEDURE — 271N000001 HC OR GENERAL SUPPLY NON-STERILE: Performed by: OBSTETRICS & GYNECOLOGY

## 2023-03-24 PROCEDURE — 120N000001 HC R&B MED SURG/OB

## 2023-03-24 PROCEDURE — 370N000017 HC ANESTHESIA TECHNICAL FEE, PER MIN: Performed by: OBSTETRICS & GYNECOLOGY

## 2023-03-24 PROCEDURE — 250N000011 HC RX IP 250 OP 636: Performed by: NURSE ANESTHETIST, CERTIFIED REGISTERED

## 2023-03-24 PROCEDURE — 250N000009 HC RX 250: Performed by: OBSTETRICS & GYNECOLOGY

## 2023-03-24 PROCEDURE — 250N000013 HC RX MED GY IP 250 OP 250 PS 637: Performed by: OBSTETRICS & GYNECOLOGY

## 2023-03-24 PROCEDURE — 59510 CESAREAN DELIVERY: CPT | Performed by: OBSTETRICS & GYNECOLOGY

## 2023-03-24 PROCEDURE — 10907ZC DRAINAGE OF AMNIOTIC FLUID, THERAPEUTIC FROM PRODUCTS OF CONCEPTION, VIA NATURAL OR ARTIFICIAL OPENING: ICD-10-PCS | Performed by: OBSTETRICS & GYNECOLOGY

## 2023-03-24 PROCEDURE — 258N000003 HC RX IP 258 OP 636: Performed by: NURSE ANESTHETIST, CERTIFIED REGISTERED

## 2023-03-24 PROCEDURE — 360N000076 HC SURGERY LEVEL 3, PER MIN: Performed by: OBSTETRICS & GYNECOLOGY

## 2023-03-24 RX ORDER — ACETAMINOPHEN 325 MG/1
975 TABLET ORAL ONCE
Status: CANCELLED | OUTPATIENT
Start: 2023-03-24 | End: 2023-03-24

## 2023-03-24 RX ORDER — NALBUPHINE HYDROCHLORIDE 10 MG/ML
2.5-5 INJECTION, SOLUTION INTRAMUSCULAR; INTRAVENOUS; SUBCUTANEOUS EVERY 6 HOURS PRN
Status: DISCONTINUED | OUTPATIENT
Start: 2023-03-24 | End: 2023-03-27 | Stop reason: HOSPADM

## 2023-03-24 RX ORDER — CEFAZOLIN SODIUM/WATER 2 G/20 ML
2 SYRINGE (ML) INTRAVENOUS SEE ADMIN INSTRUCTIONS
Status: DISCONTINUED | OUTPATIENT
Start: 2023-03-24 | End: 2023-03-25 | Stop reason: HOSPADM

## 2023-03-24 RX ORDER — ONDANSETRON 4 MG/1
4 TABLET, ORALLY DISINTEGRATING ORAL EVERY 30 MIN PRN
Status: CANCELLED | OUTPATIENT
Start: 2023-03-24

## 2023-03-24 RX ORDER — ONDANSETRON 2 MG/ML
4 INJECTION INTRAMUSCULAR; INTRAVENOUS EVERY 30 MIN PRN
Status: CANCELLED | OUTPATIENT
Start: 2023-03-24

## 2023-03-24 RX ORDER — SODIUM CHLORIDE, SODIUM LACTATE, POTASSIUM CHLORIDE, CALCIUM CHLORIDE 600; 310; 30; 20 MG/100ML; MG/100ML; MG/100ML; MG/100ML
INJECTION, SOLUTION INTRAVENOUS CONTINUOUS
Status: DISCONTINUED | OUTPATIENT
Start: 2023-03-24 | End: 2023-03-25 | Stop reason: HOSPADM

## 2023-03-24 RX ORDER — SODIUM CHLORIDE, SODIUM LACTATE, POTASSIUM CHLORIDE, CALCIUM CHLORIDE 600; 310; 30; 20 MG/100ML; MG/100ML; MG/100ML; MG/100ML
INJECTION, SOLUTION INTRAVENOUS CONTINUOUS PRN
Status: DISCONTINUED | OUTPATIENT
Start: 2023-03-24 | End: 2023-03-25 | Stop reason: HOSPADM

## 2023-03-24 RX ORDER — CARBOPROST TROMETHAMINE 250 UG/ML
250 INJECTION, SOLUTION INTRAMUSCULAR
Status: DISCONTINUED | OUTPATIENT
Start: 2023-03-24 | End: 2023-03-25 | Stop reason: HOSPADM

## 2023-03-24 RX ORDER — LIDOCAINE HYDROCHLORIDE AND EPINEPHRINE 15; 5 MG/ML; UG/ML
INJECTION, SOLUTION EPIDURAL PRN
Status: DISCONTINUED | OUTPATIENT
Start: 2023-03-24 | End: 2023-03-24

## 2023-03-24 RX ORDER — LIDOCAINE 40 MG/G
CREAM TOPICAL
Status: DISCONTINUED | OUTPATIENT
Start: 2023-03-24 | End: 2023-03-25 | Stop reason: HOSPADM

## 2023-03-24 RX ORDER — CITRIC ACID/SODIUM CITRATE 334-500MG
30 SOLUTION, ORAL ORAL
Status: COMPLETED | OUTPATIENT
Start: 2023-03-24 | End: 2023-03-24

## 2023-03-24 RX ORDER — ONDANSETRON 2 MG/ML
INJECTION INTRAMUSCULAR; INTRAVENOUS PRN
Status: DISCONTINUED | OUTPATIENT
Start: 2023-03-24 | End: 2023-03-24

## 2023-03-24 RX ORDER — FENTANYL CITRATE 50 UG/ML
INJECTION, SOLUTION INTRAMUSCULAR; INTRAVENOUS PRN
Status: DISCONTINUED | OUTPATIENT
Start: 2023-03-24 | End: 2023-03-24

## 2023-03-24 RX ORDER — OXYTOCIN/0.9 % SODIUM CHLORIDE 30/500 ML
PLASTIC BAG, INJECTION (ML) INTRAVENOUS CONTINUOUS PRN
Status: DISCONTINUED | OUTPATIENT
Start: 2023-03-24 | End: 2023-03-24

## 2023-03-24 RX ORDER — MISOPROSTOL 200 UG/1
400 TABLET ORAL
Status: DISCONTINUED | OUTPATIENT
Start: 2023-03-24 | End: 2023-03-25 | Stop reason: HOSPADM

## 2023-03-24 RX ORDER — METHYLERGONOVINE MALEATE 0.2 MG/ML
200 INJECTION INTRAVENOUS
Status: DISCONTINUED | OUTPATIENT
Start: 2023-03-24 | End: 2023-03-25 | Stop reason: HOSPADM

## 2023-03-24 RX ORDER — FENTANYL CITRATE 50 UG/ML
50 INJECTION, SOLUTION INTRAMUSCULAR; INTRAVENOUS EVERY 5 MIN PRN
Status: CANCELLED | OUTPATIENT
Start: 2023-03-24

## 2023-03-24 RX ORDER — CEFAZOLIN SODIUM/WATER 2 G/20 ML
2 SYRINGE (ML) INTRAVENOUS
Status: COMPLETED | OUTPATIENT
Start: 2023-03-24 | End: 2023-03-24

## 2023-03-24 RX ORDER — OXYTOCIN/0.9 % SODIUM CHLORIDE 30/500 ML
340 PLASTIC BAG, INJECTION (ML) INTRAVENOUS CONTINUOUS PRN
Status: DISCONTINUED | OUTPATIENT
Start: 2023-03-24 | End: 2023-03-25 | Stop reason: HOSPADM

## 2023-03-24 RX ORDER — OXYTOCIN 10 [USP'U]/ML
10 INJECTION, SOLUTION INTRAMUSCULAR; INTRAVENOUS
Status: DISCONTINUED | OUTPATIENT
Start: 2023-03-24 | End: 2023-03-25 | Stop reason: HOSPADM

## 2023-03-24 RX ORDER — MISOPROSTOL 200 UG/1
800 TABLET ORAL
Status: DISCONTINUED | OUTPATIENT
Start: 2023-03-24 | End: 2023-03-25 | Stop reason: HOSPADM

## 2023-03-24 RX ORDER — HYDROMORPHONE HCL IN WATER/PF 6 MG/30 ML
0.4 PATIENT CONTROLLED ANALGESIA SYRINGE INTRAVENOUS EVERY 5 MIN PRN
Status: CANCELLED | OUTPATIENT
Start: 2023-03-24

## 2023-03-24 RX ORDER — ACETAMINOPHEN 325 MG/1
975 TABLET ORAL ONCE
Status: COMPLETED | OUTPATIENT
Start: 2023-03-24 | End: 2023-03-24

## 2023-03-24 RX ORDER — HYDROMORPHONE HCL IN WATER/PF 6 MG/30 ML
0.2 PATIENT CONTROLLED ANALGESIA SYRINGE INTRAVENOUS EVERY 5 MIN PRN
Status: CANCELLED | OUTPATIENT
Start: 2023-03-24

## 2023-03-24 RX ORDER — OXYTOCIN/0.9 % SODIUM CHLORIDE 30/500 ML
1-24 PLASTIC BAG, INJECTION (ML) INTRAVENOUS CONTINUOUS
Status: DISCONTINUED | OUTPATIENT
Start: 2023-03-24 | End: 2023-03-25 | Stop reason: HOSPADM

## 2023-03-24 RX ORDER — DEXAMETHASONE SODIUM PHOSPHATE 4 MG/ML
INJECTION, SOLUTION INTRA-ARTICULAR; INTRALESIONAL; INTRAMUSCULAR; INTRAVENOUS; SOFT TISSUE PRN
Status: DISCONTINUED | OUTPATIENT
Start: 2023-03-24 | End: 2023-03-24

## 2023-03-24 RX ORDER — SODIUM CHLORIDE, SODIUM LACTATE, POTASSIUM CHLORIDE, CALCIUM CHLORIDE 600; 310; 30; 20 MG/100ML; MG/100ML; MG/100ML; MG/100ML
INJECTION, SOLUTION INTRAVENOUS CONTINUOUS
Status: CANCELLED | OUTPATIENT
Start: 2023-03-24

## 2023-03-24 RX ORDER — FAMOTIDINE 20 MG/1
20 TABLET, FILM COATED ORAL 2 TIMES DAILY
Status: DISCONTINUED | OUTPATIENT
Start: 2023-03-24 | End: 2023-03-27 | Stop reason: HOSPADM

## 2023-03-24 RX ORDER — TRANEXAMIC ACID 10 MG/ML
1 INJECTION, SOLUTION INTRAVENOUS EVERY 30 MIN PRN
Status: DISCONTINUED | OUTPATIENT
Start: 2023-03-24 | End: 2023-03-25 | Stop reason: HOSPADM

## 2023-03-24 RX ORDER — ONDANSETRON 2 MG/ML
4 INJECTION INTRAMUSCULAR; INTRAVENOUS EVERY 6 HOURS PRN
Status: DISCONTINUED | OUTPATIENT
Start: 2023-03-24 | End: 2023-03-25 | Stop reason: HOSPADM

## 2023-03-24 RX ORDER — EPHEDRINE SULFATE 50 MG/ML
5 INJECTION, SOLUTION INTRAMUSCULAR; INTRAVENOUS; SUBCUTANEOUS
Status: DISCONTINUED | OUTPATIENT
Start: 2023-03-24 | End: 2023-03-25 | Stop reason: HOSPADM

## 2023-03-24 RX ORDER — FENTANYL CITRATE 50 UG/ML
25 INJECTION, SOLUTION INTRAMUSCULAR; INTRAVENOUS EVERY 5 MIN PRN
Status: CANCELLED | OUTPATIENT
Start: 2023-03-24

## 2023-03-24 RX ORDER — LIDOCAINE HCL/EPINEPHRINE/PF 2%-1:200K
VIAL (ML) INJECTION PRN
Status: DISCONTINUED | OUTPATIENT
Start: 2023-03-24 | End: 2023-03-24

## 2023-03-24 RX ORDER — ONDANSETRON 4 MG/1
4 TABLET, ORALLY DISINTEGRATING ORAL EVERY 6 HOURS PRN
Status: DISCONTINUED | OUTPATIENT
Start: 2023-03-24 | End: 2023-03-25 | Stop reason: HOSPADM

## 2023-03-24 RX ADMIN — LIDOCAINE HYDROCHLORIDE AND EPINEPHRINE 5 ML: 15; 5 INJECTION, SOLUTION EPIDURAL at 03:56

## 2023-03-24 RX ADMIN — SODIUM CITRATE AND CITRIC ACID MONOHYDRATE 30 ML: 500; 334 SOLUTION ORAL at 21:47

## 2023-03-24 RX ADMIN — TRANEXAMIC ACID 1 G: 10 INJECTION, SOLUTION INTRAVENOUS at 22:30

## 2023-03-24 RX ADMIN — AZITHROMYCIN MONOHYDRATE 500 MG: 500 INJECTION, POWDER, LYOPHILIZED, FOR SOLUTION INTRAVENOUS at 21:54

## 2023-03-24 RX ADMIN — Medication 60 ML: at 22:57

## 2023-03-24 RX ADMIN — SODIUM CHLORIDE, POTASSIUM CHLORIDE, SODIUM LACTATE AND CALCIUM CHLORIDE: 600; 310; 30; 20 INJECTION, SOLUTION INTRAVENOUS at 15:02

## 2023-03-24 RX ADMIN — PHENYLEPHRINE HYDROCHLORIDE 100 MCG: 10 INJECTION INTRAVENOUS at 22:52

## 2023-03-24 RX ADMIN — ONDANSETRON 4 MG: 2 INJECTION INTRAMUSCULAR; INTRAVENOUS at 22:10

## 2023-03-24 RX ADMIN — LIDOCAINE HYDROCHLORIDE,EPINEPHRINE BITARTRATE 5 ML: 20; .005 INJECTION, SOLUTION EPIDURAL; INFILTRATION; INTRACAUDAL; PERINEURAL at 22:14

## 2023-03-24 RX ADMIN — PHENYLEPHRINE HYDROCHLORIDE 200 MCG: 10 INJECTION INTRAVENOUS at 22:37

## 2023-03-24 RX ADMIN — PHENYLEPHRINE HYDROCHLORIDE 200 MCG: 10 INJECTION INTRAVENOUS at 22:43

## 2023-03-24 RX ADMIN — SODIUM CHLORIDE, POTASSIUM CHLORIDE, SODIUM LACTATE AND CALCIUM CHLORIDE: 600; 310; 30; 20 INJECTION, SOLUTION INTRAVENOUS at 01:24

## 2023-03-24 RX ADMIN — PHENYLEPHRINE HYDROCHLORIDE 200 MCG: 10 INJECTION INTRAVENOUS at 23:02

## 2023-03-24 RX ADMIN — DEXAMETHASONE SODIUM PHOSPHATE 8 MG: 4 INJECTION, SOLUTION INTRA-ARTICULAR; INTRALESIONAL; INTRAMUSCULAR; INTRAVENOUS; SOFT TISSUE at 22:20

## 2023-03-24 RX ADMIN — Medication: at 11:37

## 2023-03-24 RX ADMIN — Medication: at 20:25

## 2023-03-24 RX ADMIN — Medication 2 MILLI-UNITS/MIN: at 01:24

## 2023-03-24 RX ADMIN — PHENYLEPHRINE HYDROCHLORIDE 200 MCG: 10 INJECTION INTRAVENOUS at 22:34

## 2023-03-24 RX ADMIN — PHENYLEPHRINE HYDROCHLORIDE 200 MCG: 10 INJECTION INTRAVENOUS at 22:31

## 2023-03-24 RX ADMIN — PHENYLEPHRINE HYDROCHLORIDE 100 MCG: 10 INJECTION INTRAVENOUS at 22:49

## 2023-03-24 RX ADMIN — Medication 2 G: at 22:06

## 2023-03-24 RX ADMIN — Medication: at 03:57

## 2023-03-24 RX ADMIN — Medication 340 ML: at 22:27

## 2023-03-24 RX ADMIN — KETOROLAC TROMETHAMINE 15 MG: 30 INJECTION, SOLUTION INTRAMUSCULAR at 22:45

## 2023-03-24 RX ADMIN — HYDROXYZINE HYDROCHLORIDE 50 MG: 50 TABLET, FILM COATED ORAL at 02:57

## 2023-03-24 RX ADMIN — FAMOTIDINE 20 MG: 20 TABLET, FILM COATED ORAL at 10:01

## 2023-03-24 RX ADMIN — ACETAMINOPHEN 975 MG: 325 TABLET, FILM COATED ORAL at 21:48

## 2023-03-24 RX ADMIN — LIDOCAINE HYDROCHLORIDE,EPINEPHRINE BITARTRATE 5 ML: 20; .005 INJECTION, SOLUTION EPIDURAL; INFILTRATION; INTRACAUDAL; PERINEURAL at 22:17

## 2023-03-24 RX ADMIN — LIDOCAINE HYDROCHLORIDE,EPINEPHRINE BITARTRATE 5 ML: 20; .005 INJECTION, SOLUTION EPIDURAL; INFILTRATION; INTRACAUDAL; PERINEURAL at 22:10

## 2023-03-24 RX ADMIN — FENTANYL CITRATE 100 MCG: 50 INJECTION, SOLUTION INTRAMUSCULAR; INTRAVENOUS at 22:10

## 2023-03-24 RX ADMIN — METHYLERGONOVINE MALEATE 200 MCG: 0.2 INJECTION INTRAVENOUS at 22:30

## 2023-03-24 ASSESSMENT — ACTIVITIES OF DAILY LIVING (ADL)
ADLS_ACUITY_SCORE: 24
ADLS_ACUITY_SCORE: 18
ADLS_ACUITY_SCORE: 24
ADLS_ACUITY_SCORE: 22
ADLS_ACUITY_SCORE: 24
ADLS_ACUITY_SCORE: 24
ADLS_ACUITY_SCORE: 22
ADLS_ACUITY_SCORE: 22
ADLS_ACUITY_SCORE: 18
ADLS_ACUITY_SCORE: 24

## 2023-03-24 ASSESSMENT — LIFESTYLE VARIABLES: TOBACCO_USE: 1

## 2023-03-24 NOTE — PROGRESS NOTES
: Patient stated she would like to move forward with a  if she has not made any change by the 12 hour janeth from Rupture and nearly maxing out Pitocin. MD updated on situation.  Plan to check at .

## 2023-03-24 NOTE — ANESTHESIA PREPROCEDURE EVALUATION
Anesthesia Pre-Procedure Evaluation    Patient: Franchesca Bowen   MRN: [id number] : 1990        Procedure :           History reviewed. No pertinent past medical history.   Past Surgical History:   Procedure Laterality Date     MYRINGOTOMY, INSERT TUBE BILATERAL, COMBINED       TN REMOVE TONSILS/ADENOIDS,<11 Y/O      age 9      No Known Allergies   Social History     Tobacco Use     Smoking status: Former     Packs/day: 0.50     Years: 7.00     Pack years: 3.50     Types: Cigarettes     Quit date: 2017     Years since quittin.8     Smokeless tobacco: Never   Substance Use Topics     Alcohol use: Not Currently      Wt Readings from Last 1 Encounters:   23 94.3 kg (208 lb)        Anesthesia Evaluation   Pt has had prior anesthetic. Type: General.        ROS/MED HX  ENT/Pulmonary:     (+) LESLI risk factors, obese, tobacco use, Past use,     Neurologic:  - neg neurologic ROS     Cardiovascular:  - neg cardiovascular ROS     METS/Exercise Tolerance:     Hematologic:  - neg hematologic  ROS     Musculoskeletal:  - neg musculoskeletal ROS     GI/Hepatic:  - neg GI/hepatic ROS     Renal/Genitourinary:  - neg Renal ROS     Endo:     (+) Obesity (Morbid),     Psychiatric/Substance Use:  - neg psychiatric ROS     Infectious Disease:  - neg infectious disease ROS     Malignancy:  - neg malignancy ROS     Other:            Physical Exam    Airway        Mallampati: II   TM distance: > 3 FB   Neck ROM: full   Mouth opening: > 3 cm    Respiratory Devices and Support         Dental  no notable dental history     (+) Completely normal teeth      Cardiovascular   cardiovascular exam normal          Pulmonary   pulmonary exam normal                OUTSIDE LABS:  CBC:   Lab Results   Component Value Date    WBC 9.0 2023    WBC 18.6 (H) 12/15/2022    HGB 10.9 (L) 2023    HGB 11.0 (L) 12/15/2022    HCT 31.8 (L) 2023    HCT 30.8 (L) 12/15/2022     2023     12/15/2022     BMP:    Lab Results   Component Value Date     (L) 12/15/2022     02/15/2022    POTASSIUM 4.5 12/15/2022    POTASSIUM 3.0 (L) 02/15/2022    CHLORIDE 101 12/15/2022    CHLORIDE 108 02/15/2022    CO2 17 (L) 12/15/2022    CO2 23 02/15/2022    BUN 9.3 12/15/2022    BUN 14 02/15/2022    CR 0.63 12/15/2022    CR 0.80 02/15/2022    GLC 89 12/15/2022     (H) 02/15/2022     COAGS: No results found for: PTT, INR, FIBR  POC:   Lab Results   Component Value Date    HCG Negative 07/31/2015    HCGS Negative 02/15/2022     HEPATIC:   Lab Results   Component Value Date    ALBUMIN 3.6 12/15/2022    PROTTOTAL 7.1 12/15/2022    ALT 9 (L) 12/15/2022    AST 38 (H) 12/15/2022    ALKPHOS 61 12/15/2022    BILITOTAL 0.2 12/15/2022     OTHER:   Lab Results   Component Value Date    LACT 2.9 (H) 02/15/2022    A1C 5.5 01/27/2022    FERNY 9.2 12/15/2022    LIPASE 227 02/15/2022    TSH 1.66 01/27/2022    CRP 5.3 02/15/2022       Anesthesia Plan    ASA Status:  2      Anesthesia Type: Epidural.              Consents    Anesthesia Plan(s) and associated risks, benefits, and realistic alternatives discussed. Questions answered and patient/representative(s) expressed understanding.     - Discussed: Risks, Benefits and Alternatives for the PROCEDURE were discussed     - Discussed with:  Patient         Postoperative Care    Pain management: IV analgesics, Oral pain medications, Multi-modal analgesia.   PONV prophylaxis: Ondansetron (or other 5HT-3), Dexamethasone or Solumedrol     Comments:           neg OB ROS.       NEFTALI Khan CRNA

## 2023-03-24 NOTE — PROGRESS NOTES
Late entry from 0830    [facility]  Labor Progress Note    Subjective:  Patient comfortable with epidural     Objective:   Patient Vitals for the past 4 hrs:   BP Temp Temp src Resp SpO2   23 0910 129/78 98.2  F (36.8  C) Oral 18 96 %   23 0720 121/69 -- -- -- 95 %     SVE: 1.5/40/-2    FHT: Baseline 140s, moderate variability, + accelerations, no decelerations  [place]: q2-4    Assessment/Plan:  Ms. Franchesca Bowen is a 32 year old  at 39w2d here for IOL. S/p 24h miso and cervidil x2, now on pitocin. Plan to cont to titrate. AROMed with clear fluid.     -Fetal Well Being   - Category 1 FHT. Reactive and reassuring   - Cephalic by BSUS. EFW 8.5   - Continue EFM and [place]    Anticipate     Naomi Moore MD   3/24/2023 11:07 AM

## 2023-03-24 NOTE — PLAN OF CARE
Pt resting comfortably following epidural. VSS. CAT I FHT the entire shift. Pitocin increased as able per contractions.

## 2023-03-24 NOTE — ANESTHESIA PROCEDURE NOTES
"Epidural catheter Procedure Note    Pre-Procedure   Staff -        CRNA: Araceli Castro APRN CRNA       Performed By: CRNA       Location: OB       Procedure Start/Stop Times: 3/24/2023 3:33 AM and 3/24/2023 4:13 AM       Pre-Anesthestic Checklist: patient identified, IV checked, risks and benefits discussed, informed consent, monitors and equipment checked, pre-op evaluation and at physician/surgeon's request  Timeout:       Correct Patient: Yes        Correct Procedure: Yes        Correct Site: Yes        Correct Position: Yes   Procedure Documentation  Procedure: epidural catheter       Patient Position: sitting       Patient Prep/Sterile Barriers: sterile gloves, mask, patient draped       Skin prep: DuraPrep       Local skin infiltrated with mL of 1% lidocaine.        Insertion Site: L3-4. (midline approach).       Technique: LORT saline        Needle Type: [employer] needle       Needle Gauge: 17.        Needle Length (Inches): 3.5        Catheter: 19 G.          Catheter threaded easily.         4 cm epidural space.         Threaded 8 cm at skin.         # of attempts: 1 and  # of redirects:  0    Assessment/Narrative         Paresthesias: No.       Test dose of 3 mL lidocaine 1.5% w/ 1:200,000 epinephrine at 03:57 CDT.         Test dose negative, 3 minutes after injection, for signs of intravascular, subdural, or intrathecal injection.       Insertion/Infusion Method: LORT saline       Aspiration negative for Heme or CSF via Epidural Catheter.       Sensory Level Left: T8.       Sensory Level Right: T8.    Medication(s) Administered   Medication Administration Time: 3/24/2023 3:33 AM     Comments:  Pt. Tolerated well, FHR stable.      FOR [facility] ([facility]) ONLY:   Pain Team Contact information: please page the Pain Team Via BDA. Search \"Pain\". During daytime hours, please page the attending first. At night please page the resident first.    "

## 2023-03-24 NOTE — PROGRESS NOTES
Delayed note  Pt s/p Miso x 24 hours without significant change.  Initial cervidil x 12 hours placed.  @ 11:00 on 3/23/23 cervix was evaluated and found to be Alejandro's score of 7  Cervidil repeat ordered.  /84   Pulse 71   Temp 98.3  F (36.8  C) (Oral)   Resp 18   LMP 06/22/2022   Cat I  A) 39 week IUP  Obesity  IOL/ Cervical ripening day #2 with some progress    P) repeat Cervidil x 12 hours and pitocin thereafter  Expect vaginal delivery    Allan Lowe MD    3/24/2023   12:53 AM

## 2023-03-24 NOTE — PLAN OF CARE
Care of pt taken over at 0100 by writer, report received from Summer. CAT I FHT, VSS. Pitocin started per merida score and provider order. Pt tolerating ctx well at this time, only feeling slight discomfort. Assessment wnl. Pt instructed to call with any needs and verbalizes understanding.

## 2023-03-24 NOTE — PROGRESS NOTES
Assumed care of patient at 0700.   Straight Cath for 225ml @ 0730. Contractions palpate moderate to strong. Ctx 1.5-3 min.     SVE by MD - unchanged. AROM @ 0826 - clear fluid, moderate amount. See MD note.

## 2023-03-24 NOTE — PROGRESS NOTES
Have continued to change positions from side lying release to sides with peanut ball variations and throne. MVU's adequate so pitocin remains at 22 duc-units/minute.

## 2023-03-24 NOTE — PROGRESS NOTES
SVE by MD at 1150 (see note). IUPC placed due to minimal change. Patient has been moved to several positions with and without peanut ball. Vitals stable.     SVE by MD @ 1405. Still 2/80%, MVU's not adequate at 1400 check, increased pitocin to 20 duc-units/min.

## 2023-03-24 NOTE — PROGRESS NOTES
Patient requested something for her acid reflux after breakfast. Verbal order from MD placed. Did side lying release on both sides. Patient currently sitting in throne d/t acid reflux. Not really feeling contractions but they palpate moderate to strong.

## 2023-03-25 LAB — HGB BLD-MCNC: 9.6 G/DL (ref 11.7–15.7)

## 2023-03-25 PROCEDURE — 250N000011 HC RX IP 250 OP 636: Performed by: OBSTETRICS & GYNECOLOGY

## 2023-03-25 PROCEDURE — 36415 COLL VENOUS BLD VENIPUNCTURE: CPT | Performed by: OBSTETRICS & GYNECOLOGY

## 2023-03-25 PROCEDURE — 250N000013 HC RX MED GY IP 250 OP 250 PS 637: Performed by: OBSTETRICS & GYNECOLOGY

## 2023-03-25 PROCEDURE — 85018 HEMOGLOBIN: CPT | Performed by: OBSTETRICS & GYNECOLOGY

## 2023-03-25 PROCEDURE — 120N000001 HC R&B MED SURG/OB

## 2023-03-25 RX ORDER — ONDANSETRON 4 MG/1
4 TABLET, ORALLY DISINTEGRATING ORAL EVERY 6 HOURS PRN
Status: DISCONTINUED | OUTPATIENT
Start: 2023-03-25 | End: 2023-03-27 | Stop reason: HOSPADM

## 2023-03-25 RX ORDER — AMOXICILLIN 250 MG
1 CAPSULE ORAL 2 TIMES DAILY
Status: DISCONTINUED | OUTPATIENT
Start: 2023-03-25 | End: 2023-03-27 | Stop reason: HOSPADM

## 2023-03-25 RX ORDER — MODIFIED LANOLIN
OINTMENT (GRAM) TOPICAL
Status: DISCONTINUED | OUTPATIENT
Start: 2023-03-25 | End: 2023-03-27 | Stop reason: HOSPADM

## 2023-03-25 RX ORDER — SIMETHICONE 80 MG
80 TABLET,CHEWABLE ORAL 4 TIMES DAILY PRN
Status: DISCONTINUED | OUTPATIENT
Start: 2023-03-25 | End: 2023-03-27 | Stop reason: HOSPADM

## 2023-03-25 RX ORDER — MISOPROSTOL 200 UG/1
800 TABLET ORAL
Status: DISCONTINUED | OUTPATIENT
Start: 2023-03-25 | End: 2023-03-27 | Stop reason: HOSPADM

## 2023-03-25 RX ORDER — PROCHLORPERAZINE MALEATE 10 MG
10 TABLET ORAL EVERY 6 HOURS PRN
Status: DISCONTINUED | OUTPATIENT
Start: 2023-03-25 | End: 2023-03-27 | Stop reason: HOSPADM

## 2023-03-25 RX ORDER — NALOXONE HYDROCHLORIDE 0.4 MG/ML
0.2 INJECTION, SOLUTION INTRAMUSCULAR; INTRAVENOUS; SUBCUTANEOUS
Status: DISCONTINUED | OUTPATIENT
Start: 2023-03-25 | End: 2023-03-27 | Stop reason: HOSPADM

## 2023-03-25 RX ORDER — CARBOPROST TROMETHAMINE 250 UG/ML
250 INJECTION, SOLUTION INTRAMUSCULAR
Status: DISCONTINUED | OUTPATIENT
Start: 2023-03-25 | End: 2023-03-27 | Stop reason: HOSPADM

## 2023-03-25 RX ORDER — DEXTROSE, SODIUM CHLORIDE, SODIUM LACTATE, POTASSIUM CHLORIDE, AND CALCIUM CHLORIDE 5; .6; .31; .03; .02 G/100ML; G/100ML; G/100ML; G/100ML; G/100ML
INJECTION, SOLUTION INTRAVENOUS CONTINUOUS
Status: DISCONTINUED | OUTPATIENT
Start: 2023-03-25 | End: 2023-03-27 | Stop reason: HOSPADM

## 2023-03-25 RX ORDER — METOCLOPRAMIDE 10 MG/1
10 TABLET ORAL EVERY 6 HOURS PRN
Status: DISCONTINUED | OUTPATIENT
Start: 2023-03-25 | End: 2023-03-27 | Stop reason: HOSPADM

## 2023-03-25 RX ORDER — OXYTOCIN/0.9 % SODIUM CHLORIDE 30/500 ML
100-340 PLASTIC BAG, INJECTION (ML) INTRAVENOUS CONTINUOUS PRN
Status: DISCONTINUED | OUTPATIENT
Start: 2023-03-25 | End: 2023-03-27 | Stop reason: HOSPADM

## 2023-03-25 RX ORDER — HYDROCORTISONE 25 MG/G
CREAM TOPICAL 3 TIMES DAILY PRN
Status: DISCONTINUED | OUTPATIENT
Start: 2023-03-25 | End: 2023-03-27 | Stop reason: HOSPADM

## 2023-03-25 RX ORDER — KETOROLAC TROMETHAMINE 30 MG/ML
30 INJECTION, SOLUTION INTRAMUSCULAR; INTRAVENOUS EVERY 6 HOURS
Status: COMPLETED | OUTPATIENT
Start: 2023-03-25 | End: 2023-03-25

## 2023-03-25 RX ORDER — METOCLOPRAMIDE HYDROCHLORIDE 5 MG/ML
10 INJECTION INTRAMUSCULAR; INTRAVENOUS EVERY 6 HOURS PRN
Status: DISCONTINUED | OUTPATIENT
Start: 2023-03-25 | End: 2023-03-27 | Stop reason: HOSPADM

## 2023-03-25 RX ORDER — ACETAMINOPHEN 325 MG/1
975 TABLET ORAL EVERY 6 HOURS
Status: DISCONTINUED | OUTPATIENT
Start: 2023-03-25 | End: 2023-03-27 | Stop reason: HOSPADM

## 2023-03-25 RX ORDER — IBUPROFEN 800 MG/1
800 TABLET, FILM COATED ORAL EVERY 6 HOURS
Status: DISCONTINUED | OUTPATIENT
Start: 2023-03-26 | End: 2023-03-27 | Stop reason: HOSPADM

## 2023-03-25 RX ORDER — TRANEXAMIC ACID 10 MG/ML
1 INJECTION, SOLUTION INTRAVENOUS EVERY 30 MIN PRN
Status: DISCONTINUED | OUTPATIENT
Start: 2023-03-25 | End: 2023-03-27 | Stop reason: HOSPADM

## 2023-03-25 RX ORDER — ONDANSETRON 2 MG/ML
4 INJECTION INTRAMUSCULAR; INTRAVENOUS EVERY 6 HOURS PRN
Status: DISCONTINUED | OUTPATIENT
Start: 2023-03-25 | End: 2023-03-27 | Stop reason: HOSPADM

## 2023-03-25 RX ORDER — NALOXONE HYDROCHLORIDE 0.4 MG/ML
0.4 INJECTION, SOLUTION INTRAMUSCULAR; INTRAVENOUS; SUBCUTANEOUS
Status: DISCONTINUED | OUTPATIENT
Start: 2023-03-25 | End: 2023-03-27 | Stop reason: HOSPADM

## 2023-03-25 RX ORDER — LIDOCAINE 40 MG/G
CREAM TOPICAL
Status: DISCONTINUED | OUTPATIENT
Start: 2023-03-25 | End: 2023-03-27 | Stop reason: HOSPADM

## 2023-03-25 RX ORDER — OXYTOCIN 10 [USP'U]/ML
10 INJECTION, SOLUTION INTRAMUSCULAR; INTRAVENOUS
Status: DISCONTINUED | OUTPATIENT
Start: 2023-03-25 | End: 2023-03-27 | Stop reason: HOSPADM

## 2023-03-25 RX ORDER — AMOXICILLIN 250 MG
2 CAPSULE ORAL 2 TIMES DAILY
Status: DISCONTINUED | OUTPATIENT
Start: 2023-03-25 | End: 2023-03-27 | Stop reason: HOSPADM

## 2023-03-25 RX ORDER — METHYLERGONOVINE MALEATE 0.2 MG/ML
200 INJECTION INTRAVENOUS
Status: DISCONTINUED | OUTPATIENT
Start: 2023-03-25 | End: 2023-03-27 | Stop reason: HOSPADM

## 2023-03-25 RX ORDER — BISACODYL 10 MG
10 SUPPOSITORY, RECTAL RECTAL DAILY PRN
Status: DISCONTINUED | OUTPATIENT
Start: 2023-03-27 | End: 2023-03-27 | Stop reason: HOSPADM

## 2023-03-25 RX ORDER — PROCHLORPERAZINE 25 MG
25 SUPPOSITORY, RECTAL RECTAL EVERY 12 HOURS PRN
Status: DISCONTINUED | OUTPATIENT
Start: 2023-03-25 | End: 2023-03-27 | Stop reason: HOSPADM

## 2023-03-25 RX ORDER — OXYCODONE HYDROCHLORIDE 5 MG/1
5-10 TABLET ORAL EVERY 4 HOURS PRN
Status: DISCONTINUED | OUTPATIENT
Start: 2023-03-25 | End: 2023-03-27 | Stop reason: HOSPADM

## 2023-03-25 RX ORDER — OXYTOCIN/0.9 % SODIUM CHLORIDE 30/500 ML
340 PLASTIC BAG, INJECTION (ML) INTRAVENOUS CONTINUOUS PRN
Status: DISCONTINUED | OUTPATIENT
Start: 2023-03-25 | End: 2023-03-27 | Stop reason: HOSPADM

## 2023-03-25 RX ORDER — OXYCODONE HYDROCHLORIDE 5 MG/1
5 TABLET ORAL EVERY 4 HOURS PRN
Status: DISCONTINUED | OUTPATIENT
Start: 2023-03-25 | End: 2023-03-25

## 2023-03-25 RX ORDER — MISOPROSTOL 200 UG/1
400 TABLET ORAL
Status: DISCONTINUED | OUTPATIENT
Start: 2023-03-25 | End: 2023-03-27 | Stop reason: HOSPADM

## 2023-03-25 RX ADMIN — SENNOSIDES AND DOCUSATE SODIUM 1 TABLET: 50; 8.6 TABLET ORAL at 08:32

## 2023-03-25 RX ADMIN — KETOROLAC TROMETHAMINE 30 MG: 30 INJECTION, SOLUTION INTRAMUSCULAR; INTRAVENOUS at 16:10

## 2023-03-25 RX ADMIN — OXYCODONE HYDROCHLORIDE 5 MG: 5 TABLET ORAL at 08:31

## 2023-03-25 RX ADMIN — ACETAMINOPHEN 975 MG: 325 TABLET, FILM COATED ORAL at 21:59

## 2023-03-25 RX ADMIN — ACETAMINOPHEN 975 MG: 325 TABLET, FILM COATED ORAL at 04:05

## 2023-03-25 RX ADMIN — FAMOTIDINE 20 MG: 20 TABLET, FILM COATED ORAL at 08:32

## 2023-03-25 RX ADMIN — OXYCODONE HYDROCHLORIDE 5 MG: 5 TABLET ORAL at 12:28

## 2023-03-25 RX ADMIN — KETOROLAC TROMETHAMINE 30 MG: 30 INJECTION, SOLUTION INTRAMUSCULAR; INTRAVENOUS at 10:17

## 2023-03-25 RX ADMIN — KETOROLAC TROMETHAMINE 30 MG: 30 INJECTION, SOLUTION INTRAMUSCULAR; INTRAVENOUS at 04:45

## 2023-03-25 RX ADMIN — ACETAMINOPHEN 975 MG: 325 TABLET, FILM COATED ORAL at 16:07

## 2023-03-25 RX ADMIN — OXYCODONE HYDROCHLORIDE 10 MG: 5 TABLET ORAL at 16:07

## 2023-03-25 RX ADMIN — OXYCODONE HYDROCHLORIDE 10 MG: 5 TABLET ORAL at 19:53

## 2023-03-25 RX ADMIN — SENNOSIDES AND DOCUSATE SODIUM 1 TABLET: 50; 8.6 TABLET ORAL at 19:39

## 2023-03-25 RX ADMIN — ACETAMINOPHEN 975 MG: 325 TABLET, FILM COATED ORAL at 10:15

## 2023-03-25 ASSESSMENT — ACTIVITIES OF DAILY LIVING (ADL)
ADLS_ACUITY_SCORE: 19
ADLS_ACUITY_SCORE: 19
ADLS_ACUITY_SCORE: 22
ADLS_ACUITY_SCORE: 22
ADLS_ACUITY_SCORE: 26
ADLS_ACUITY_SCORE: 24
ADLS_ACUITY_SCORE: 19
ADLS_ACUITY_SCORE: 19
ADLS_ACUITY_SCORE: 26
ADLS_ACUITY_SCORE: 22
ADLS_ACUITY_SCORE: 26
ADLS_ACUITY_SCORE: 22

## 2023-03-25 NOTE — PLAN OF CARE
LEXI at 2040: 4/80-90/-1-0  Pt reports that she would still like to have a csection  Dr. Moore updated - plan for csection at 2145; gave verbal order to stop pitocin, remove IUPC and place garcia catheter for precedure  OR nurse called by Sarah CAMARENA RN  Anesthesia notified  Plan of care reviewed with pt and pt prepped for csection

## 2023-03-25 NOTE — ANESTHESIA POSTPROCEDURE EVALUATION
Patient: Franchesca Bowen    Procedure: Procedure(s):   SECTION       Anesthesia Type:  Epidural    Note:  Disposition: Inpatient   Postop Pain Control: Uneventful            Sign Out: Well controlled pain   PONV: No   Neuro/Psych: Uneventful            Sign Out: Acceptable/Baseline neuro status   Airway/Respiratory: Uneventful            Sign Out: Acceptable/Baseline resp. status   CV/Hemodynamics: Uneventful            Sign Out: Acceptable CV status; No obvious hypovolemia; No obvious fluid overload   Other NRE: NONE   DID A NON-ROUTINE EVENT OCCUR? No     Epidural-to- Updated ASA: 2      Last vitals:  Vitals:    23   BP: 119/72     Pulse:      Resp:      Temp: 36.9  C (98.5  F)     SpO2:  99% 99%       Electronically Signed By: NEFTALI Reid CRNA  2023  11:20 PM

## 2023-03-25 NOTE — PROGRESS NOTES
[facility] OB/GYN Daily Postpartum Note    S: Franchesca Bowen is feeling well this morning. She denies any complaints. Her pain is well-controlled pain medications. She tolerating a regular diet without nausea or vomiting. Ambulating without difficulty. Lochia is appropriate Breastfeeding without questions or concerns.     O:   VS: Patient Vitals for the past 24 hrs:   BP Temp Temp src Pulse Resp SpO2   03/25/23 0445 106/60 -- -- 69 -- 96 %   03/25/23 0336 118/67 97.8  F (36.6  C) Oral 69 16 95 %   03/25/23 0230 121/70 97.7  F (36.5  C) Oral -- 16 97 %   03/25/23 0130 122/60 -- -- 66 -- --   03/25/23 0100 (!) 126/93 -- -- 84 -- --   03/25/23 0030 109/74 -- -- 59 -- --   03/25/23 0015 116/53 -- -- 71 -- --   03/25/23 0000 102/61 -- -- 73 16 --   03/24/23 2345 100/61 98.2  F (36.8  C) Oral 72 -- 96 %   03/24/23 2330 103/43 -- -- 68 -- --   03/24/23 2325 106/57 -- -- 74 -- --   03/24/23 2145 -- -- -- -- -- 99 %   03/24/23 2140 -- -- -- -- -- 99 %   03/24/23 2127 119/72 98.5  F (36.9  C) Oral -- -- --   03/24/23 1924 130/83 98.2  F (36.8  C) Oral -- 16 --   03/24/23 1710 119/74 98.1  F (36.7  C) Oral -- 18 --   03/24/23 1540 120/77 98.3  F (36.8  C) Oral -- 16 --   03/24/23 1335 119/78 -- -- -- 18 --   03/24/23 1300 -- 98.3  F (36.8  C) Oral -- -- --   03/24/23 1201 -- 97.7  F (36.5  C) Oral -- -- --   03/24/23 1000 123/68 -- -- -- -- --   03/24/23 0910 129/78 98.2  F (36.8  C) Oral -- 18 96 %   03/24/23 0855 -- -- -- -- -- 96 %   03/24/23 0850 -- -- -- -- -- 96 %   03/24/23 0845 -- -- -- -- -- 95 %   03/24/23 0840 -- -- -- -- -- 96 %   03/24/23 0835 -- -- -- -- -- 96 %   03/24/23 0830 -- -- -- -- -- 96 %   03/24/23 0825 -- -- -- -- -- 95 %   03/24/23 0820 -- -- -- -- -- 95 %   03/24/23 0815 -- -- -- -- -- 94 %   03/24/23 0810 -- -- -- -- -- 94 %   03/24/23 0805 -- -- -- -- -- 95 %   03/24/23 0800 -- -- -- -- -- 96 %       I/O last 3 completed shifts:  In: 700 [I.V.:700]  Out: 1650 [Urine:1975;  Blood:721]    General: resting in bed, in NAD  CV: reg rate, well perfused  Resp: no increased work of breathing  Abdomen: soft, appropriately tender, nondistended  Fundus firm below the umbilicus  Extremities: non-tender, non-edematous     Recent Labs   Lab 23  0616 23   HGB 9.6* 10.9*       A: Franchesca Bowen is a 32 year old  who is POD#1 s/p PLTCS, doing well    P:  Continue routine pp cares  Heme 10.9>  > 9.6, cont to montior  GI: tolerating regular diet  Feeding: breast  Contraception: declines  Rh positive, Rubella Immune  Disposition: routine PP cares, anticipate d/c tomorrow or POD#3    Naomi Moore MD, MD  [facility]   3/25/2023 7:45 AM

## 2023-03-25 NOTE — PLAN OF CARE
S:Delivery  B:Induced  Labor,39w2d    No results found for: GBS with antibiotic treatment not indicated 4 hours prior to delivery.  A: Patient delivered Primary C/S for  maternal request  at 2226 with Dr. SANDEE Moore in attendance and baby placed on mother's abdomen for delayed cord clamping. Baby received from surgeon. Baby to warmer for drying and wrapped in blanket..  Apgars 9/9.Delivery .  IV infusion of Oxytocin  infused. Placenta removal spontaneous. MD does not want placenta sent to pathology.  See Flowsheet for VS and PP checks.  .  Labor care plan goals met, transition now to postpartum care. Postpartum QBLpending  R: Expect routine postpartum care. Anticipate first feeding within the hour or whenever infant displays feeding cues. Continue skin to skin. Prior discussion with mother indicates that feeding plan is Breast feeding . Educated mother on importance of exclusive breastfeeding, expected feeding readiness cues and encouraged her to observe for these cues while rooming in. Informed her that breastfeeding assistance would be provided.

## 2023-03-25 NOTE — PLAN OF CARE
Patient prepped for csection:   Pre op checklist complete  zythromax infusing  bicitra and tylenol administered  SCDS on   Consent signed  Belly scrubbed  Questions answered

## 2023-03-25 NOTE — PLAN OF CARE
Goal Outcome Evaluation:      Plan of Care Reviewed With: patient, significant other    Overall Patient Progress: improvingOverall Patient Progress: improving     Patient up voiding. Complains of incisional discomfort oral pain medication controlling pain fair. Using abd binder and ice pack for comfort.

## 2023-03-25 NOTE — PROGRESS NOTES
At approximately 1600 rechecked patient and found to have made minimal change despite generally adequate MVUs and near max dose pitocin. Discussed with patient that at this point do not have clear indication for CS and could consider max pitocin further overnight +/- a pitocin break. At that time pt did express feeling quite tired and consideration of CS sooner. Discussed that would like to see her on pitocin/ruptured for at least 12 hours ruptured which would be about . At that time she would have been on pitocin approximately 20 hours and ruptured for 12 hours with IUPC in place for 8 hours. She said she would consider further continuing on pitocin with hope for active labor overnight vs pursuing elective CS sooner. At approximately ~1900, did receive phone call pt interested in primary .     Encouraged to wait until at least 12 hours ruptured to see if further change. At  pt rechecked and found to have small change to 4 cm but would still like to pursue elective CS. Is concerned that even if she is able to get into labor and reach complete that may require a CS given baby's size with AC at 97%. Reviewed risks of surgery including bleeding, infection, injury to surrounding structure and patient elected to proceed. Discussed that given her lack of progress in labor despite high dose pitocin throughout the day in conjunction with baby's size, her choice does seem reasonable if she is feeling too tired to proceed further with induction.    Informed consent signed, orders placed, OR crew is en route.    Naomi Moore MD   3/24/2023 9:50 PM

## 2023-03-25 NOTE — PLAN OF CARE
Pt recovering well at this time. VSS. Able to bend knees and ankles bilaterally - left leg is still a little heavier than right.  Pt denies pain, n/v. Is tolerating food and fluids  FF, midline, U/1  Light lochia flow no clots  Spouse and parents at bedside. Call light in reach - encouraged to call with needs  Questions encouraged and answered

## 2023-03-25 NOTE — PLAN OF CARE
Late entry for pt care:   Report received from Georgina EDMONDSON RN. Writer assumes care of patient. Plan of care discussed with patient - pt reports that at this point she would prefer a csection. We discussed repositioning options and pt would prefer just repositioning to help with comfort.   VSS. Straight cathed for 275cc.   Edema noted in BLE; lungs CTA throughout; pt denies SOB.   Pitocin infusing at 22ml/hr.   Ctx every 2-3min with adequate MVUs - see flowsheet.  Cat I FHT  Extended mountain pose and sidelying release performed with patient.   Pt remained left side with peanut ball after interventions - reports she is comfortable at this time.  Plan of care discussed with patient - monitor, LEXI. She and spouse agree with plan. Questions encouraged and answered. Call light in reach. Will continue to monitor.

## 2023-03-25 NOTE — PLAN OF CARE
-VSS. Fundus 1 below U.   -Lochia scant.   -Pain managed with scheduled tylenol& toradol, abdominal binder, ice. Pain rating 3/10  -adequate urine output via garcia. Garcia removed at 0515  -pt marched at side of bed at 0515. L leg still feeling better  - pt education done about NB care, breast feeding, pain management

## 2023-03-25 NOTE — ANESTHESIA CARE TRANSFER NOTE
Patient: Franchesca Bowen    Procedure: Procedure(s):   SECTION       Diagnosis: Elective surgery [Z41.9]  Diagnosis Additional Information: No value filed.    Anesthesia Type:   Epidural     Note:    Oropharynx: oropharynx clear of all foreign objects  Level of Consciousness: awake  Oxygen Supplementation: room air    Independent Airway: airway patency satisfactory and stable  Dentition: dentition unchanged  Vital Signs Stable: post-procedure vital signs reviewed and stable  Report to RN Given: handoff report given  Patient transferred to: PACU    Handoff Report: Identifed the Patient, Identified the Reponsible Provider, Reviewed the pertinent medical history, Discussed the surgical course, Reviewed Intra-OP anesthesia mangement and issues during anesthesia, Set expectations for post-procedure period and Allowed opportunity for questions and acknowledgement of understanding      Vitals:  Vitals Value Taken Time   BP     Temp     Pulse     Resp     SpO2         Electronically Signed By: NEFTALI Reid CRNA  2023  11:19 PM

## 2023-03-25 NOTE — OP NOTE
[facility]  Full Operative Progress Note     Surgery Date:  3/24/2023  Surgeon:  Naomi Moore MD   Assistants:  Clemencia Lomeli NP    Pre-op Diagnosis:  1. Intrauterine pregnancy at 39w2d     2. Elective       3. Failed IOL     Post-op Diagnosis:  1. Same      2. Liveborn male infant   Procedure:  Primary low-transverse  section with double layer uterine closure via Phannenstiel incision    Anesthesia: Spinal  EBL:  580 mL  IVF:  See anesthesia   UOP:  See anesthesia  Drains: Milligan Catheter   Specimens:  Cord blood/segment    Complications: None     Indications:   Franchesca Bowen is a 32 year old  at 39w2d admitted for IOL. Pt was given miso x24 hours, followed by cervidil x2 and started on pitocin. After approximately 8 hours she underwent rupture with clear fluid. Eventually, IUPC was placed to further titrate up pitocin given frequency of contractions. She was continued on high dose pitocin and made minimal change. At approximately 20h pitocin, pt inquired about primary CS. She had made small change to 4 cm but was concerned about slow progress and feeling fatigued. Did discuss we could continue to proceed with high dose pitocin overnight given adequate MVUs and overall reassuring FHT. Patient stated she preferred to move towards surgery as she also had concerns about needing eventual CS with babys AC at 97%/EFW at 85%. Reviewed in detail the risks, benefits, and alternatives of  section were discussed with the patient, and she wished to proceed.    Findings:   1. Clear amniotic fluid  2. Liveborn infant in OT presentation. Apgars 9 at 1 minute & 9 at 5 minutes. Weight 8 lb 13 oz.  3. Normal uterus, fallopian tubes, and ovaries.     Procedure Details:   The patient was brought to the OR, where adequate Epidural anesthesia was administered.  She was placed in the dorsal supine position with a slight leftward tilt. She was prepped and draped in the usual  sterile fashion. A surgical time out was performed. A pfannenstiel skin incision was made with the scalpel, and carried down to the underlying fascia with sharp and blunt dissection. The fascia was incised in the midline, and the incision was extended laterally with the Kamara scissors. The superior aspect of the fascia was grasped with the Kocher clamps and dissected off of the underlying rectus muscles with blunt and sharp dissection. Attention was then turned to the inferior aspect of the fascia, which was similarly dissected off of the underlying rectus muscles. The rectus muscles were  in the midline, and the peritoneum was entered bluntly, and the opening was extended with digital pressure. Rivera O placed.     A transverse hysterotomy was made with the scalpel in the lower uterine segment, and the incision was extended with digital pressure.  The infant was noted to be in the OA position, and was delivered atraumatically. The shoulders delivered easily. Single nuchal cord was noted. The cord was doubly clamped and cut after one min, and the infant was handed off to the awaiting RN staff. A segment of cord was cut. The placenta was delivered with gentle traction on the umbilical cord and uterine massage. The uterus was exteriorized and cleared of all clots and debris. Uterine tone was noted adequate with pitocin given through the running IV, massage, TXA and methergine The hysterotomy was closed with a running locked suture of 0 Vicryl.  The hysterotomy was then imbricated using an 0 Monocryl suture. The hysterotomy was noted to be hemostatic. The posterior cul-de-sac was cleared of all clots and debris. The uterus was returned to the abdomen. The pericolic gutters were cleared of all clots and debris. The hysterotomy was reexamined and noted to be hemostatic.     The fascia and rectus muscles were examined and areas of oozing were controlled with electrocautery. The fascia was closed with a running 0  Vicryl suture. The subcutaneous tissue was irrigated and areas of oozing were controlled with electrocautery. The subcutaneous tissue was greater than 2 cm in thickness, and was therefore closed with 3-0 plain gut.  The skin was closed with 4-0 monocryl and skin glue.    All sponge, needle, and instrument counts were correct. The patient tolerated the procedure well, and was transferred to recovery in stable condition.    Naomi Moore MD   3/24/2023 11:16 PM

## 2023-03-26 PROCEDURE — 120N000001 HC R&B MED SURG/OB

## 2023-03-26 PROCEDURE — 250N000013 HC RX MED GY IP 250 OP 250 PS 637: Performed by: OBSTETRICS & GYNECOLOGY

## 2023-03-26 RX ORDER — POLYETHYLENE GLYCOL 3350 17 G/17G
17 POWDER, FOR SOLUTION ORAL DAILY
Status: DISCONTINUED | OUTPATIENT
Start: 2023-03-26 | End: 2023-03-27 | Stop reason: HOSPADM

## 2023-03-26 RX ADMIN — OXYCODONE HYDROCHLORIDE 5 MG: 5 TABLET ORAL at 16:28

## 2023-03-26 RX ADMIN — FAMOTIDINE 20 MG: 20 TABLET, FILM COATED ORAL at 20:28

## 2023-03-26 RX ADMIN — OXYCODONE HYDROCHLORIDE 5 MG: 5 TABLET ORAL at 17:07

## 2023-03-26 RX ADMIN — POLYETHYLENE GLYCOL 3350 17 G: 17 POWDER, FOR SOLUTION ORAL at 17:59

## 2023-03-26 RX ADMIN — OXYCODONE HYDROCHLORIDE 10 MG: 5 TABLET ORAL at 00:24

## 2023-03-26 RX ADMIN — IBUPROFEN 800 MG: 800 TABLET ORAL at 20:31

## 2023-03-26 RX ADMIN — ACETAMINOPHEN 975 MG: 325 TABLET, FILM COATED ORAL at 22:16

## 2023-03-26 RX ADMIN — ACETAMINOPHEN 975 MG: 325 TABLET, FILM COATED ORAL at 04:11

## 2023-03-26 RX ADMIN — OXYCODONE HYDROCHLORIDE 10 MG: 5 TABLET ORAL at 23:40

## 2023-03-26 RX ADMIN — SENNOSIDES AND DOCUSATE SODIUM 2 TABLET: 50; 8.6 TABLET ORAL at 20:28

## 2023-03-26 RX ADMIN — IBUPROFEN 800 MG: 800 TABLET ORAL at 00:24

## 2023-03-26 RX ADMIN — OXYCODONE HYDROCHLORIDE 5 MG: 5 TABLET ORAL at 10:55

## 2023-03-26 RX ADMIN — ACETAMINOPHEN 975 MG: 325 TABLET, FILM COATED ORAL at 16:27

## 2023-03-26 RX ADMIN — OXYCODONE HYDROCHLORIDE 10 MG: 5 TABLET ORAL at 06:50

## 2023-03-26 RX ADMIN — ACETAMINOPHEN 975 MG: 325 TABLET, FILM COATED ORAL at 10:55

## 2023-03-26 RX ADMIN — SENNOSIDES AND DOCUSATE SODIUM 2 TABLET: 50; 8.6 TABLET ORAL at 08:13

## 2023-03-26 RX ADMIN — IBUPROFEN 800 MG: 800 TABLET ORAL at 14:15

## 2023-03-26 RX ADMIN — IBUPROFEN 800 MG: 800 TABLET ORAL at 06:49

## 2023-03-26 ASSESSMENT — ACTIVITIES OF DAILY LIVING (ADL)
ADLS_ACUITY_SCORE: 19

## 2023-03-26 NOTE — PROGRESS NOTES
[facility] OB/GYN Daily Postpartum Note     S: Franchesca Bowen is feeling well this morning. She denies any complaints. Her pain is well-controlled pain medications. She tolerating a regular diet without nausea or vomiting. Ambulating without difficulty. Lochia is appropriate Breastfeeding without questions or concerns.      O: /59 (BP Location: Left arm, Patient Position: Semi-Em's, Cuff Size: Adult Regular)   Pulse 72   Temp 97.3  F (36.3  C) (Oral)   Resp 16   LMP 2022   SpO2 98%       General: resting in bed, in NAD  CV: reg rate, well perfused  Resp: no increased work of breathing  Abdomen: soft, appropriately tender, nondistended  Fundus firm below the umbilicus  Extremities: non-tender, non-edematous           Recent Labs   Lab 23  0616 23   HGB 9.6* 10.9*         A: Franchesca Bowen is a 32 year old  who is POD#2 s/p PLTCS, doing well     P:  Continue routine pp cares  Heme 10.9>  > 9.6, cont to monitor  GI: tolerating regular diet  Feeding: breast  Contraception: declines  Rh positive, Rubella Immune  Disposition: routine PP cares, anticipate d/c tomorrow      Naomi Moore MD, MD  [facility]   3/26/2023 4:04 AM

## 2023-03-26 NOTE — PLAN OF CARE
-VSS  -Lochia light   -Fundus firm, 1 below U  -Pain controlled with scheduled tylenol and ibuprofen, also received PRN  -Incision CDI  -Pumping every 2-3 hours.   -independent with self and infant cares.  -Anticipate discharge on 3/27

## 2023-03-26 NOTE — PLAN OF CARE
Patient states she has not had a bowel movement since Tuesday. She was passing gas this morning but has not passed any since. Bowel sounds present but hypoactive. Patient was given x2 Senokot this AM. Dr. Moore notified of above. Order received and placed for daily miralax. Patient also encouraged to ambulate in hallway and increase fluid intake.

## 2023-03-26 NOTE — PLAN OF CARE
Goal Outcome Evaluation:      Plan of Care Reviewed With: patient, significant other    Overall Patient Progress: improvingOverall Patient Progress: improving     Oral pain medication controlling incisional pain well. Up walking in teran, tolerating activity.

## 2023-03-26 NOTE — PLAN OF CARE
Data: Vital signs within normal limits. Postpartum checks within normal limits - see flow record. Patient eating and drinking normally. Patient able to empty bladder independently and is up ambulating. No apparent signs of infection. Incision healing well. Patient performing self cares and is able to care for infant.  Action: Patient medicated during the shift for pain and cramping. See MAR. Patient reassessed within 1 hour after each medication and pain was improved - patient stated she was comfortable. Patient education done about breastfeeding, nipple shield use, pumping, infant cares. See flow record. Patient is pumping while infant sleepy and not latching well.  Response: Positive attachment behaviors observed with infant. Support persons 1 present.   Plan: Anticipate discharge on 3/27/23.

## 2023-03-27 VITALS
WEIGHT: 202.5 LBS | OXYGEN SATURATION: 98 % | BODY MASS INDEX: 40.9 KG/M2 | TEMPERATURE: 97.9 F | RESPIRATION RATE: 18 BRPM | SYSTOLIC BLOOD PRESSURE: 115 MMHG | HEART RATE: 94 BPM | DIASTOLIC BLOOD PRESSURE: 77 MMHG

## 2023-03-27 PROBLEM — Z34.90 ENCOUNTER FOR PLANNED INDUCTION OF LABOR: Status: RESOLVED | Noted: 2023-03-21 | Resolved: 2023-03-27

## 2023-03-27 PROBLEM — E66.01 MORBID OBESITY (H): Status: ACTIVE | Noted: 2023-03-27

## 2023-03-27 PROBLEM — Z98.891 S/P PRIMARY LOW TRANSVERSE C-SECTION: Status: ACTIVE | Noted: 2023-03-27

## 2023-03-27 PROBLEM — D62 ANEMIA DUE TO BLOOD LOSS, ACUTE: Status: ACTIVE | Noted: 2023-03-27

## 2023-03-27 PROCEDURE — 250N000013 HC RX MED GY IP 250 OP 250 PS 637: Performed by: OBSTETRICS & GYNECOLOGY

## 2023-03-27 RX ORDER — FERROUS SULFATE 325(65) MG
325 TABLET ORAL DAILY
Qty: 30 TABLET | Refills: 0 | Status: SHIPPED | OUTPATIENT
Start: 2023-03-27 | End: 2024-06-11

## 2023-03-27 RX ORDER — IBUPROFEN 800 MG/1
800 TABLET, FILM COATED ORAL EVERY 6 HOURS PRN
Qty: 30 TABLET | Refills: 0 | Status: SHIPPED | OUTPATIENT
Start: 2023-03-27 | End: 2024-06-11

## 2023-03-27 RX ORDER — FERROUS SULFATE 325(65) MG
325 TABLET ORAL DAILY
Status: DISCONTINUED | OUTPATIENT
Start: 2023-03-27 | End: 2023-03-27 | Stop reason: HOSPADM

## 2023-03-27 RX ORDER — OXYCODONE HYDROCHLORIDE 5 MG/1
5 TABLET ORAL EVERY 6 HOURS PRN
Qty: 12 TABLET | Refills: 0 | Status: SHIPPED | OUTPATIENT
Start: 2023-03-27 | End: 2024-06-11

## 2023-03-27 RX ORDER — AMOXICILLIN 250 MG
1 CAPSULE ORAL 2 TIMES DAILY PRN
Qty: 30 TABLET | Refills: 1 | Status: SHIPPED | OUTPATIENT
Start: 2023-03-27 | End: 2024-06-11

## 2023-03-27 RX ORDER — PRENATAL VIT/IRON FUM/FOLIC AC 27MG-0.8MG
1 TABLET ORAL DAILY
Status: DISCONTINUED | OUTPATIENT
Start: 2023-03-27 | End: 2023-03-27 | Stop reason: HOSPADM

## 2023-03-27 RX ADMIN — OXYCODONE HYDROCHLORIDE 10 MG: 5 TABLET ORAL at 13:58

## 2023-03-27 RX ADMIN — IBUPROFEN 800 MG: 800 TABLET ORAL at 09:16

## 2023-03-27 RX ADMIN — OXYCODONE HYDROCHLORIDE 10 MG: 5 TABLET ORAL at 09:40

## 2023-03-27 RX ADMIN — PRENATAL VITAMINS-IRON FUMARATE 27 MG IRON-FOLIC ACID 0.8 MG TABLET 1 TABLET: at 09:16

## 2023-03-27 RX ADMIN — POLYETHYLENE GLYCOL 3350 17 G: 17 POWDER, FOR SOLUTION ORAL at 09:17

## 2023-03-27 RX ADMIN — IBUPROFEN 800 MG: 800 TABLET ORAL at 01:55

## 2023-03-27 RX ADMIN — SENNOSIDES AND DOCUSATE SODIUM 2 TABLET: 50; 8.6 TABLET ORAL at 09:16

## 2023-03-27 RX ADMIN — ACETAMINOPHEN 975 MG: 325 TABLET, FILM COATED ORAL at 04:14

## 2023-03-27 RX ADMIN — FERROUS SULFATE TAB 325 MG (65 MG ELEMENTAL FE) 325 MG: 325 (65 FE) TAB at 09:16

## 2023-03-27 RX ADMIN — ACETAMINOPHEN 975 MG: 325 TABLET, FILM COATED ORAL at 10:45

## 2023-03-27 ASSESSMENT — ACTIVITIES OF DAILY LIVING (ADL)
ADLS_ACUITY_SCORE: 19

## 2023-03-27 NOTE — PLAN OF CARE
Patient discharged per wheelchair with infant in car seat. Mother verified that her band matches her infant's band by comparing the infant's  MR#.  Discharge instructions given. Encouraged to call for any problems, questions or concerns. RXs tylenol, ibu, and oxycodone.

## 2023-03-27 NOTE — ASSESSMENT & PLAN NOTE
POD#2 s/p PLTCS for failure to progress, maternal request  Routine post operative care  Encourage ambulation  Optimize pain management  Lactation support  Anticipate discharge home today

## 2023-03-27 NOTE — DISCHARGE SUMMARY
[facility] Discharge Summary    Franchesca Bowen MRN# [id number]   Age: 32 year old YOB: 1990     Date of Admission:  3/21/2023  Date of Discharge::  3/27/2023  Admitting Physician:  Luzmaria Winston MD  Discharge Physician:  Avani Beatty DO     Home clinic: [facility]          Admission Diagnoses:   Encounter for planned induction of labor [Z34.90]          Discharge Diagnosis:   Primary  section   Acute blood loss anemia       Procedures:   Procedure(s): Primary low transverse  section       No other procedures performed during this admission           Medications Prior to Admission:     Medications Prior to Admission   Medication Sig Dispense Refill Last Dose     famotidine (PEPCID) 20 MG tablet Take 20 mg by mouth 2 times daily   3/20/2023     Prenatal Vit-Fe Fumarate-FA (PRENATAL MULTIVITAMIN W/IRON) 27-0.8 MG tablet Take 1 tablet by mouth daily   3/21/2023     [DISCONTINUED] ondansetron (ZOFRAN ODT) 4 MG ODT tab Take 1 tablet (4 mg) by mouth every 8 hours as needed for nausea 10 tablet 0 More than a month     [DISCONTINUED] oxyCODONE-acetaminophen (PERCOCET) 5-325 MG tablet Take 1 tablet by mouth every 6 hours as needed for severe pain (7-10), moderate pain (4-6) or moderate to severe pain 12 tablet 0 More than a month             Discharge Medications:     Current Discharge Medication List      START taking these medications    Details   ferrous sulfate (FEROSUL) 325 (65 Fe) MG tablet Take 1 tablet (325 mg) by mouth daily  Qty: 30 tablet, Refills: 0    Associated Diagnoses: Anemia due to blood loss, acute      ibuprofen (ADVIL/MOTRIN) 800 MG tablet Take 1 tablet (800 mg) by mouth every 6 hours as needed for moderate pain (4-6)  Qty: 30 tablet, Refills: 0    Associated Diagnoses: S/P  section      oxyCODONE (ROXICODONE) 5 MG tablet Take 1 tablet (5 mg) by mouth every 6 hours as needed for severe pain (7-10)  Qty: 12  tablet, Refills: 0    Associated Diagnoses: S/P  section      senna-docusate (SENOKOT-S/PERICOLACE) 8.6-50 MG tablet Take 1 tablet by mouth 2 times daily as needed for constipation  Qty: 30 tablet, Refills: 1    Associated Diagnoses: S/P  section         CONTINUE these medications which have NOT CHANGED    Details   famotidine (PEPCID) 20 MG tablet Take 20 mg by mouth 2 times daily      Prenatal Vit-Fe Fumarate-FA (PRENATAL MULTIVITAMIN W/IRON) 27-0.8 MG tablet Take 1 tablet by mouth daily         STOP taking these medications       ondansetron (ZOFRAN ODT) 4 MG ODT tab Comments:   Reason for Stopping:         oxyCODONE-acetaminophen (PERCOCET) 5-325 MG tablet Comments:   Reason for Stopping:                     Consultations:   No consultations were requested during this admission          Brief History of Labor or Admission:   Franchesca Bowen is a 32 year old  at 39w2d admitted for IOL. Pt was given miso x24 hours, followed by cervidil x2 and started on pitocin. After approximately 8 hours she underwent rupture with clear fluid. Eventually, IUPC was placed to further titrate up pitocin given frequency of contractions. She was continued on high dose pitocin and made minimal change. At approximately 20h pitocin, pt inquired about primary CS. She had made small change to 4 cm but was concerned about slow progress and feeling fatigued. Did discuss we could continue to proceed with high dose pitocin overnight given adequate MVUs and overall reassuring FHT. Patient stated she preferred to move towards surgery as she also had concerns about needing eventual CS with babys AC at 97%/EFW at 85%. Reviewed in detail the risks, benefits, and alternatives of  section were discussed with the patient, and she wished to proceed. Liveborn infant in OT presentation. Apgars 9 at 1 minute & 9 at 5 minutes. Weight 8 lb 13 oz.           Hospital Course:   The patient's hospital course was unremarkable.   She recovered as anticipated and experienced no post-operative complications.  On discharge, her pain was well controlled. Vaginal bleeding is similar to peak menstrual flow.  Voiding without difficulty.  Ambulating well and tolerating a normal diet.  No fever or significant wound drainage.  Breastfeeding well with supplementation.  Infant is stable.  Had a bowel movement.  She was discharged on post-partum day #2.    Post-partum hemoglobin:   Hemoglobin   Date Value Ref Range Status   03/25/2023 9.6 (L) 11.7 - 15.7 g/dL Final             Discharge Instructions and Follow-Up:   Discharge diet: Regular   Discharge activity: No heavy lifting over 15 pounds for 6 weeks  Pelvic rest for 6 weeks   Discharge follow-up: Follow up with OBGYN in 6 weeks   Wound care: Ice to area for comfort           Discharge Disposition:   Discharged to home      Attestation:  I have reviewed today's vital signs, notes, medications, labs and imaging.    Avani Beatty DO

## 2023-03-27 NOTE — PROGRESS NOTES
[facility] OB/GYN Department    Post-Partum Progress Note: POD #2    Name: Franchesca Bowen  Date: 3/27/2023    Subjective:   Patient seen and examined.  No complaints today.  Pain well controlled.  Tolerating regular diet, without nausea or vomiting.  Ambulating without difficulty.  Milligan removed post operatively, voiding spontaneously. + flatus, had a BM this morning. Breast feeding with supplementation.     ROS:    General/Constitutional:  Denies chills or fever  Respiratory: Denies shortness of breath  Cardiovascular: Denies chest pain  Gastrointestinal:  +mild uterine cramping, no nausea or vomiting, + flatus, + BM  Genitourinary: Denies difficulty urinating  Musculoskeletal: + peripheral edema      Objective:   No intake or output data in the 24 hours ending 23 0918    Patient Vitals for the past 24 hrs:   BP Temp Temp src Pulse Resp   23 0925 115/77 97.9  F (36.6  C) Axillary 94 --   23 2327 111/63 98.2  F (36.8  C) Oral 77 18   23 1800 -- -- -- -- 18   23 1704 112/66 98.2  F (36.8  C) Oral 71 20       Recent Labs   Lab 23  0616 23  1948   HGB 9.6* 10.9*         General appearance: well-hydrated, A&O x 3, no apparent distress  ENT: EOMI, sclera anicteric   Lungs: Equal expansion bilaterally, no accessory muscle use  Heart: No heaves or thrills. No peripheral varicosities  Constitutional: See vitals  Abdomen: Soft, non-distended, no rebound or rigidity. Incision c/d/i, uterus firm at umbilicus with non-tender fundus   Neurologic: CN II-XII grossly intact, no lateralizing defects, no gross movement abnormalities  Extremities: 2+ pitting edema in feet and calves, no calf tenderness      Assessment and Plan:    Anemia due to blood loss, acute  Appropriate drop for intraoperative loss  Patient asymptomatic  Home with iron and PNV    S/P primary low transverse   POD#2 s/p PLTCS for failure to progress, maternal request  Routine post operative  care  Encourage ambulation  Optimize pain management  Lactation support  Anticipate discharge home today      Morbid obesity (H)  Encourage ambulation    Avani Beatty DO

## 2023-03-27 NOTE — PLAN OF CARE
-VSS  -Lochia light   -Fundus firm, 2 below U  -Pain controlled with scheduled tylenol and ibuprofen, also received PRN oxy X1  -Incision CDI  -Pumping every 2-3 hours.   -independent with self and infant cares.  -Anticipate discharge on 3/27

## 2023-04-24 ENCOUNTER — MEDICAL CORRESPONDENCE (OUTPATIENT)
Dept: HEALTH INFORMATION MANAGEMENT | Facility: CLINIC | Age: 33
End: 2023-04-24
Payer: COMMERCIAL

## 2023-05-08 ENCOUNTER — PRENATAL OFFICE VISIT (OUTPATIENT)
Dept: OBGYN | Facility: CLINIC | Age: 33
End: 2023-05-08
Payer: COMMERCIAL

## 2023-05-08 VITALS
TEMPERATURE: 99 F | DIASTOLIC BLOOD PRESSURE: 79 MMHG | HEIGHT: 59 IN | RESPIRATION RATE: 16 BRPM | SYSTOLIC BLOOD PRESSURE: 117 MMHG | BODY MASS INDEX: 36.29 KG/M2 | HEART RATE: 76 BPM | WEIGHT: 180 LBS

## 2023-05-08 PROCEDURE — 99207 PR POST PARTUM EXAM: CPT | Performed by: OBSTETRICS & GYNECOLOGY

## 2023-05-08 ASSESSMENT — ANXIETY QUESTIONNAIRES
6. BECOMING EASILY ANNOYED OR IRRITABLE: NOT AT ALL
2. NOT BEING ABLE TO STOP OR CONTROL WORRYING: NOT AT ALL
3. WORRYING TOO MUCH ABOUT DIFFERENT THINGS: NOT AT ALL
1. FEELING NERVOUS, ANXIOUS, OR ON EDGE: NOT AT ALL
GAD7 TOTAL SCORE: 0
7. FEELING AFRAID AS IF SOMETHING AWFUL MIGHT HAPPEN: NOT AT ALL
5. BEING SO RESTLESS THAT IT IS HARD TO SIT STILL: NOT AT ALL
GAD7 TOTAL SCORE: 0

## 2023-05-08 ASSESSMENT — PATIENT HEALTH QUESTIONNAIRE - PHQ9
SUM OF ALL RESPONSES TO PHQ QUESTIONS 1-9: 2
5. POOR APPETITE OR OVEREATING: NOT AT ALL

## 2023-05-08 NOTE — NURSING NOTE
"Initial /79 (BP Location: Right arm, Patient Position: Chair, Cuff Size: Adult Regular)   Pulse 76   Temp 99  F (37.2  C) (Tympanic)   Resp 16   Ht 1.499 m (4' 11\")   Wt 81.6 kg (180 lb)   LMP 06/22/2022   Breastfeeding Yes   BMI 36.36 kg/m   Estimated body mass index is 36.36 kg/m  as calculated from the following:    Height as of this encounter: 1.499 m (4' 11\").    Weight as of this encounter: 81.6 kg (180 lb). .      "

## 2023-05-08 NOTE — PROGRESS NOTES
"[facility]    Post Partum Office Visit    CC: Post partum visit    HPI: Franchesca Bowen is a 32 year old  who presents for a 6 week post-partum visit.  Patient delivered on 3/24/23, by Dr. Moore at 39w2d gestation.  Patient delivered via PLTCS for failure to dilate.  Complications During Labor: failure to dilate. IOL for morbid obesity.     Patient doing well since delivery, no complaints or concerns. No pain, lochia resolved. Normal bowel and bladder function. She is exclusively pumping.     Information  Sex: male  Weight: 8 lbs. 13 oz.  Complications: none  Feeding Method: breast milk    Maternal Information  Postpartum Depression:denies  Resumed [place]: no  Last Pap Smear: 2022 NIL, HPV negative  Birth Control Method:none    ROS:  General/Constitutional:  Denies chills or fever  Respiratory: Denies shortness of breath, cough, wheezing   Cardiovascular: Denies chest pain, exertional pain, irregular heartbeat  Gastrointestinal:  Denies abdominal pain, constipation, nausea, or vomiting  Genitourinary: Denies hematuria, difficulty urinating, frequency, or dysuria   Skin: Denies dry skin, itching, rash, new skin lesions  Musculoskeletal: Denies aching muscles or joints, swelling in joints, back pain, shoulder pain, or painful feet, no peripheral edema  Psychiatric: Denies post partum depression    Physical Exam:   Vitals:    23 1502   BP: 117/79   BP Location: Right arm   Patient Position: Chair   Cuff Size: Adult Regular   Pulse: 76   Resp: 16   Temp: 99  F (37.2  C)   TempSrc: Tympanic   Weight: 81.6 kg (180 lb)   Height: 1.499 m (4' 11\")      Estimated body mass index is 36.36 kg/m  as calculated from the following:    Height as of this encounter: 1.499 m (4' 11\").    Weight as of this encounter: 81.6 kg (180 lb).    General appearance: well-hydrated, A&O x 3, no apparent distress  Lungs: Equal expansion bilaterally, no accessory muscle use  Heart: No " heaves or thrills. No peripheral varicosities  Abdomen: Soft, non-tender, non-distended. No rebound, rigidity, or guarding. Incision well healed  Extremities:  edema  Neuro: CN II-XII grossly intact      Assessment and Plan:     Encounter Diagnosis   Name Primary?     Routine postpartum follow-up Yes       Appropriate post partum recovery. Discussed clearance for normal activity.    Patient declines contraception. Discussed recommendation of 12+ months after C section prior to conception.    Plan for routine GYN cares, PAP smear due 2027.       Avani Beatty DO

## 2023-05-15 ENCOUNTER — HOSPITAL ENCOUNTER (OUTPATIENT)
Dept: ULTRASOUND IMAGING | Facility: CLINIC | Age: 33
Discharge: HOME | End: 2023-05-15
Attending: STUDENT IN AN ORGANIZED HEALTH CARE EDUCATION/TRAINING PROGRAM
Payer: COMMERCIAL

## 2023-05-15 ENCOUNTER — HOSPITAL ENCOUNTER (OUTPATIENT)
Dept: GENERAL RADIOLOGY | Facility: CLINIC | Age: 33
Discharge: HOME | End: 2023-05-15
Attending: STUDENT IN AN ORGANIZED HEALTH CARE EDUCATION/TRAINING PROGRAM
Payer: COMMERCIAL

## 2023-05-15 DIAGNOSIS — N20.0 NEPHROLITHIASIS: ICD-10-CM

## 2023-05-15 PROCEDURE — 76770 US EXAM ABDO BACK WALL COMP: CPT

## 2023-05-15 PROCEDURE — 74018 RADEX ABDOMEN 1 VIEW: CPT

## 2023-05-16 ENCOUNTER — VIRTUAL VISIT (OUTPATIENT)
Dept: UROLOGY | Facility: CLINIC | Age: 33
End: 2023-05-16
Payer: COMMERCIAL

## 2023-05-16 DIAGNOSIS — N20.0 NEPHROLITHIASIS: Primary | ICD-10-CM

## 2023-05-16 PROCEDURE — 99213 OFFICE O/P EST LOW 20 MIN: CPT | Mod: VID | Performed by: STUDENT IN AN ORGANIZED HEALTH CARE EDUCATION/TRAINING PROGRAM

## 2023-05-16 NOTE — PROGRESS NOTES
Video-Visit Details    Type of service:  Video Visit   Joined the call at 5/16/2023, 1:32:09?pm.  Left the call at 5/16/2023, 1:42:17?pm.  You were on the call for 10 minutes 7 seconds .  Originating Location (pt. Location): Home    Distant Location (provider location):  On-site  Platform used for Video Visit: [facility]      UROLOGY OUTPATIENT VISIT      Chief Complaint:   Kidney stone      Synopsis   Franchesca Bowen is a very pleasant AGE: 32 year old year old person who was initially 31 weeks when I saw her. She has since delivered baby.    Father's side with kidney stones      She has had a stone bin 2022 that she passed spontaneously, it was 3 mm right UVJ stone     12/16/2022 R flank pain. +emesis. A renal US showed moderate R hydronephrosis and perhaps 7 mm stone in kidney      1/3/2023 - follow-up after renal US. Renal US showed stone in R distal ureter vs phlebolith. No renal stone noted. We planned to get another US    2/7/2023 - She was doing well no flank pain. We planned to see her after delivery.    Today has baby boy. Doing well. No flank pain. No hematuria.     Imaging  5/15/2023 - Renal US with no hydronephrosis and no stones noted  5/15/2023 - KUB : 3 mm calcification overlying the upper pole of the right  kidney, may represent a small stone. No definite ureterolithiasis.  Calcifications noted in the left hemipelvis, favor phleboliths.  Moderate volume of formed stool in the colon. No acute bony  Abnormality.      The following distinct labs were reviewed   Most Recent 3 BMP's:Recent Labs   Lab Test 12/15/22  1843 02/15/22  1224   * 139   POTASSIUM 4.5 3.0*   CHLORIDE 101 108   CO2 17* 23   BUN 9.3 14   CR 0.63 0.80   ANIONGAP 16* 8   FERNY 9.2 8.7   GLC 89 115*       Medical Comorbidities    No past medical history on file.            Medications     Current Outpatient Medications   Medication     famotidine (PEPCID) 20 MG tablet     ferrous sulfate (FEROSUL) 325 (65 Fe) MG tablet      ibuprofen (ADVIL/MOTRIN) 800 MG tablet     oxyCODONE (ROXICODONE) 5 MG tablet     Prenatal Vit-Fe Fumarate-FA (PRENATAL MULTIVITAMIN W/IRON) 27-0.8 MG tablet     senna-docusate (SENOKOT-S/PERICOLACE) 8.6-50 MG tablet     No current facility-administered medications for this visit.            Assessment/Plan   32 year old year old person with history of kidney stones    1. Right Kidney Stone - I think she has a 3 mm stone in R kidney. She had a stone in this location on CT initially and I think she has passed a stone during the course of her pregnancy. It is small enough to observe.     We also discussed stone prevention.  We reviewed general recommendations to prevent kidney stones including the followin) Low oxalate diet. We discussed foods that are particularly high in oxalate including spinach, rhubarb, beets, nuts, nut butters, and black teas.     2) Low salt diet. Discussed common foods with high amounts of salt as well as dietary strategies to minimize sodium.     3) High fluid intake. Recommend 3 liters of fluid daily to produce goal of 2.5L of urine.     4) Modest animal protein. Recommend limiting animal protein to one serving or less daily.     5) Normal dietary calcium.    I discussed obtaining litholink but would like to hold off for now with new baby in life     Will plan to see her in 1 year with VEGA    CC:  [place] [facility]    20 minutes spent on the clinical encounter date doing chart review, history and exam, documentation and further activities as noted above

## 2023-05-16 NOTE — LETTER
5/16/2023       RE: Franchesca Bowen  [address] [address]     Dear Colleague,    Thank you for referring your patient, Franchesca Bowen, to the [facility] [place] [facility]. Please see a copy of my visit note below.    Video-Visit Details    Type of service:  Video Visit   Joined the call at 5/16/2023, 1:32:09?pm.  Left the call at 5/16/2023, 1:42:17?pm.  You were on the call for 10 minutes 7 seconds .  Originating Location (pt. Location): Home    Distant Location (provider location):  On-site  Platform used for Video Visit: [facility]      UROLOGY OUTPATIENT VISIT      Chief Complaint:   Kidney stone      Synopsis   Franchesca Bowen is a very pleasant AGE: 32 year old year old person who was initially 31 weeks when I saw her. She has since delivered baby.    Father's side with kidney stones      She has had a stone bin 2022 that she passed spontaneously, it was 3 mm right UVJ stone     12/16/2022 R flank pain. +emesis. A renal US showed moderate R hydronephrosis and perhaps 7 mm stone in kidney      1/3/2023 - follow-up after renal US. Renal US showed stone in R distal ureter vs phlebolith. No renal stone noted. We planned to get another US    2/7/2023 - She was doing well no flank pain. We planned to see her after delivery.    Today has baby boy. Doing well. No flank pain. No hematuria.     Imaging  5/15/2023 - Renal US with no hydronephrosis and no stones noted  5/15/2023 - KUB : 3 mm calcification overlying the upper pole of the right  kidney, may represent a small stone. No definite ureterolithiasis.  Calcifications noted in the left hemipelvis, favor phleboliths.  Moderate volume of formed stool in the colon. No acute bony  Abnormality.      The following distinct labs were reviewed   Most Recent 3 BMP's:Recent Labs   Lab Test 12/15/22  1843 02/15/22  1224   * 139   POTASSIUM 4.5 3.0*   CHLORIDE 101 108    CO2 17* 23   BUN 9.3 14   CR 0.63 0.80   ANIONGAP 16* 8   FERNY 9.2 8.7   GLC 89 115*       Medical Comorbidities    No past medical history on file.            Medications     Current Outpatient Medications   Medication    famotidine (PEPCID) 20 MG tablet    ferrous sulfate (FEROSUL) 325 (65 Fe) MG tablet    ibuprofen (ADVIL/MOTRIN) 800 MG tablet    oxyCODONE (ROXICODONE) 5 MG tablet    Prenatal Vit-Fe Fumarate-FA (PRENATAL MULTIVITAMIN W/IRON) 27-0.8 MG tablet    senna-docusate (SENOKOT-S/PERICOLACE) 8.6-50 MG tablet     No current facility-administered medications for this visit.            Assessment/Plan   32 year old year old person with history of kidney stones    1. Right Kidney Stone - I think she has a 3 mm stone in R kidney. She had a stone in this location on CT initially and I think she has passed a stone during the course of her pregnancy. It is small enough to observe.     We also discussed stone prevention.  We reviewed general recommendations to prevent kidney stones including the followin) Low oxalate diet. We discussed foods that are particularly high in oxalate including spinach, rhubarb, beets, nuts, nut butters, and black teas.     2) Low salt diet. Discussed common foods with high amounts of salt as well as dietary strategies to minimize sodium.     3) High fluid intake. Recommend 3 liters of fluid daily to produce goal of 2.5L of urine.     4) Modest animal protein. Recommend limiting animal protein to one serving or less daily.     5) Normal dietary calcium.    I discussed obtaining litholink but would like to hold off for now with new baby in life     Will plan to see her in 1 year with VEGA    CC:  [place] [facility]    20 minutes spent on the clinical encounter date doing chart review, history and exam, documentation and further activities as noted above    Sincerely,    Evie Olson MD

## 2023-05-16 NOTE — NURSING NOTE
Is the patient currently in the state of [place]? YES    Visit mode:VIDEO    If the visit is dropped, the patient can be reconnected by: VIDEO VISIT: Text to cell phone: [phone]    Will anyone else be joining the visit? NO      How would you like to obtain your AVS? MyChart    Are changes needed to the allergy or medication list? NO    Reason for visit: Video Visit (Follow up)

## 2023-05-24 ENCOUNTER — MEDICAL CORRESPONDENCE (OUTPATIENT)
Dept: HEALTH INFORMATION MANAGEMENT | Facility: CLINIC | Age: 33
End: 2023-05-24
Payer: COMMERCIAL

## 2023-07-31 ENCOUNTER — MEDICAL CORRESPONDENCE (OUTPATIENT)
Dept: HEALTH INFORMATION MANAGEMENT | Facility: CLINIC | Age: 33
End: 2023-07-31
Payer: COMMERCIAL

## 2023-09-29 ENCOUNTER — MEDICAL CORRESPONDENCE (OUTPATIENT)
Dept: HEALTH INFORMATION MANAGEMENT | Facility: CLINIC | Age: 33
End: 2023-09-29
Payer: COMMERCIAL

## 2023-11-15 NOTE — NURSING NOTE
"Initial /86 (BP Location: Right arm, Patient Position: Chair, Cuff Size: Adult Regular)   Pulse 73   Temp 98.4  F (36.9  C) (Tympanic)   Resp 16   Ht 1.492 m (4' 10.75\")   Wt 80.7 kg (177 lb 14.4 oz)   LMP 06/22/2022   Breastfeeding No   BMI 36.24 kg/m   Estimated body mass index is 36.24 kg/m  as calculated from the following:    Height as of this encounter: 1.492 m (4' 10.75\").    Weight as of this encounter: 80.7 kg (177 lb 14.4 oz). .    Prerna Rogers CMA    " Accompanied by mom    Parental Concerns none    Water Source:  private well    Second Hand Smoke Exposure No     Pets Yes dogs    Parental Hearing Concerns No    Parental Vision Concerns No    TB Risk: Low Risk  Child Born Outside of [place] No  Contact with anyone with TB No  Contact with anyone with positive PPD No    Lead Risk Low Risk  House <1960 and peeling paint No  House <1960 and renovations No  Hobbies with Pb exposure No  Sibs or Playmates with Pb poisoning No  Live near a Pb smelter or battery recycling center No    Screening/Safety:  Child rides in the 2nd row of the car.   Child rides in a 5 point harness carseat.  Any patient education given No.

## 2024-02-05 ENCOUNTER — OFFICE VISIT (OUTPATIENT)
Dept: OBGYN | Facility: CLINIC | Age: 34
End: 2024-02-05
Payer: COMMERCIAL

## 2024-02-05 VITALS
RESPIRATION RATE: 18 BRPM | HEART RATE: 99 BPM | DIASTOLIC BLOOD PRESSURE: 88 MMHG | HEIGHT: 59 IN | SYSTOLIC BLOOD PRESSURE: 135 MMHG | TEMPERATURE: 97.4 F | WEIGHT: 195 LBS | BODY MASS INDEX: 39.31 KG/M2

## 2024-02-05 DIAGNOSIS — E66.09 CLASS 2 OBESITY DUE TO EXCESS CALORIES WITH BODY MASS INDEX (BMI) OF 39.0 TO 39.9 IN ADULT, UNSPECIFIED WHETHER SERIOUS COMORBIDITY PRESENT: ICD-10-CM

## 2024-02-05 DIAGNOSIS — N92.1 MENORRHAGIA WITH IRREGULAR CYCLE: Primary | ICD-10-CM

## 2024-02-05 DIAGNOSIS — E66.812 CLASS 2 OBESITY DUE TO EXCESS CALORIES WITH BODY MASS INDEX (BMI) OF 39.0 TO 39.9 IN ADULT, UNSPECIFIED WHETHER SERIOUS COMORBIDITY PRESENT: ICD-10-CM

## 2024-02-05 LAB
HCG UR QL: NEGATIVE
HGB BLD-MCNC: 13.8 G/DL (ref 11.7–15.7)
INTERNAL QC OK POCT: NORMAL
POCT KIT EXPIRATION DATE: NORMAL
POCT KIT LOT NUMBER: NORMAL
TSH SERPL DL<=0.005 MIU/L-ACNC: 2.22 UIU/ML (ref 0.3–4.2)

## 2024-02-05 PROCEDURE — 36415 COLL VENOUS BLD VENIPUNCTURE: CPT | Performed by: ADVANCED PRACTICE MIDWIFE

## 2024-02-05 PROCEDURE — 85018 HEMOGLOBIN: CPT | Performed by: ADVANCED PRACTICE MIDWIFE

## 2024-02-05 PROCEDURE — 99214 OFFICE O/P EST MOD 30 MIN: CPT | Performed by: ADVANCED PRACTICE MIDWIFE

## 2024-02-05 PROCEDURE — 84443 ASSAY THYROID STIM HORMONE: CPT | Performed by: ADVANCED PRACTICE MIDWIFE

## 2024-02-05 PROCEDURE — 81025 URINE PREGNANCY TEST: CPT | Performed by: ADVANCED PRACTICE MIDWIFE

## 2024-02-05 RX ORDER — TRANEXAMIC ACID 650 MG/1
1300 TABLET ORAL 3 TIMES DAILY
Qty: 30 TABLET | Refills: 3 | Status: SHIPPED | OUTPATIENT
Start: 2024-02-05 | End: 2024-06-11

## 2024-02-05 NOTE — PROGRESS NOTES
S: Here due to heavy, irregular menstrual bleeding staring on 1/18/23.  They are trying for another pregnancy.  She had a baby 3/23 and periods restarted 3 months after.  Periods have been regular until now.  She denies any risk of STI.  Her weight has increased since her last baby.  She has a history of infertility.  She would prefer not to take hormonal birth control.    O:  Appears well, concerned  Pelvic exam offered and declined  Body mass index is 39.39 kg/m .  UPT negative   A/P:   (N92.1) Menorrhagia with irregular cycle  (primary encounter diagnosis)  Plan: Hemoglobin, US Pelvic Complete with         Transvaginal, tranexamic acid (LYSTEDA) 650 MG         tablet, TSH with free T4 reflex  Discussed reasons for irregular periods.  Exam offered and declined today.    US offered and accepted.  Hemoglobin and TSH testing offered and accepted   Encouraged healthy diet and exercise to increase fertility.    She declines hormonal birth control to help with bleeding.  Lysteda explained and and ordered.  She wanted this ordered to help manage heavy bleeding.    The next step is to see Delfina CROWLEY for fertility assessment and assistance    (E66.09,  Z68.39) Class 2 obesity due to excess calories with body mass index (BMI) of 39.0 to 39.9 in adult, unspecified whether serious comorbidity present  Plan: Discussed healthy diet and exercise. We discussed comp wt management program at [employer] and/or [facility] to assist.  Discussed cutting down sugar and empty carbs.  She declines any referrals at this time.

## 2024-02-10 ENCOUNTER — HEALTH MAINTENANCE LETTER (OUTPATIENT)
Age: 34
End: 2024-02-10

## 2024-06-11 ENCOUNTER — VIRTUAL VISIT (OUTPATIENT)
Dept: OBGYN | Facility: CLINIC | Age: 34
End: 2024-06-11
Attending: OBSTETRICS & GYNECOLOGY
Payer: COMMERCIAL

## 2024-06-11 DIAGNOSIS — Z34.80 PRENATAL CARE OF MULTIGRAVIDA, ANTEPARTUM: Primary | ICD-10-CM

## 2024-06-11 PROCEDURE — 99207 PR NO CHARGE NURSE ONLY: CPT | Mod: 93

## 2024-06-11 NOTE — PATIENT INSTRUCTIONS
Learning About Pregnancy  Your Care Instructions     Your health in the early weeks of your pregnancy is particularly important for your baby's health. Take good care of yourself. Anything you do that harms your body can also harm your baby.  Make sure to go to all of your doctor appointments. Regular checkups will help keep you and your baby healthy.  How can you care for yourself at home?  Diet    Choose healthy foods like fruits, vegetables, whole grains, lean proteins, and healthy fats.     Choose foods that are good sources of calcium, iron, and folate. You can try dairy products, dark leafy greens, fortified orange juice and cereals, almonds, broccoli, dried fruit, and beans.     Do not skip meals or go for many hours without eating. If you are nauseated, try to eat a small, healthy snack every 2 to 3 hours.     Avoid fish that are high in mercury. These include shark, swordfish, mallika mackerel, marlin, orange roughy, and bigeye tuna, as well as tilefish from the [facility].     It's okay to eat up to 8 to 12 ounces a week of fish that are low in mercury or up to 4 ounces a week of fish that have medium levels of mercury. Some fish that are low in mercury are salmon, shrimp, canned light tuna, cod, and tilapia. Some fish that have medium levels of mercury are halibut and white albacore tuna.     Drink plenty of fluids. If you have kidney, heart, or liver disease and have to limit fluids, talk with your doctor before you increase the amount of fluids you drink.     Limit caffeine to about 200 to 300 mg per day. On average, a cup of brewed coffee has around 80 to 100 mg of caffeine.     Do not drink alcohol, such as beer, wine, or hard liquor.     Take a multivitamin that contains at least 400 micrograms (mcg) of folic acid to help prevent birth defects. Fortified cereal and whole wheat bread are good additional sources of folic acid.     Increase the calcium in your diet. Try to drink a quart of skim milk  each day. You may also take calcium supplements and choose foods such as cheese and yogurt.   Lifestyle    Make sure you go to your follow-up appointments.     Get plenty of rest. You may be unusually tired while you are pregnant.     Get at least 30 minutes of exercise on most days of the week. Walking is a good choice. If you have not exercised in the past, start out slowly. Take several short walks each day.     Do not smoke. If you need help quitting, talk to your doctor about stop-smoking programs. These can increase your chances of quitting for good.     Do not touch cat feces or litter boxes. Also, wash your hands after you handle raw meat, and fully cook all meat before you eat it. Wear gloves when you work in the yard or garden, and wash your hands well when you are done. Cat feces, raw or undercooked meat, and contaminated dirt can cause an infection that may harm your baby or lead to a miscarriage.     Avoid things that can make your body too hot and may be harmful to your baby, such as a hot tub or sauna. Or talk with your doctor before doing anything that raises your body temperature. Your doctor can tell you if it's safe.     Avoid chemical fumes, paint fumes, or poisons.     Do not use illegal drugs, marijuana, or alcohol.   Medicines    Review all of your medicines with your doctor. Some of your routine medicines may need to be changed to protect your baby.     Use acetaminophen (Tylenol) to relieve minor problems, such as a mild headache or backache or a mild fever with cold symptoms. Do not use nonsteroidal anti-inflammatory drugs (NSAIDs), such as ibuprofen (Advil, Motrin) or naproxen (Aleve), unless your doctor says it is okay.     Do not take two or more pain medicines at the same time unless the doctor told you to. Many pain medicines have acetaminophen, which is Tylenol. Too much acetaminophen (Tylenol) can be harmful.     Take your medicines exactly as prescribed. Call your doctor if you  "think you are having a problem with your medicine.   To manage morning sickness    Keep food in your stomach, but not too much at once. Try eating five or six small meals a day instead of three large meals.     For nausea when you wake up, eat a small snack, such as a couple of crackers or pretzels, before rising. Allow a few minutes for your stomach to settle before you slowly get up.     Try to avoid smells and foods that make you feel nauseated. High-fat or greasy foods, milk, and coffee may make nausea worse. Some foods that may be easier to tolerate include cold, spicy, sour, and salty foods.     Drink enough fluids. Water and other caffeine-free drinks are good choices.     Take your prenatal vitamins at night on a full stomach.     Try foods and drinks made with tiff. Tiff may help with nausea.     Get lots of rest. Morning sickness may be worse when you are tired.     Talk to your doctor about over-the-counter products, such as vitamin B6 or doxylamine, to help relieve symptoms.     Try a P6 acupressure wrist band. These anti-nausea wristbands help some people.   Follow-up care is a key part of your treatment and safety. Be sure to make and go to all appointments, and call your doctor if you are having problems. It's also a good idea to know your test results and keep a list of the medicines you take.  Where can you learn more?  Go to [email or web address]  Enter E868 in the search box to learn more about \"Learning About Pregnancy.\"  Current as of: July 10, 2023               Content Version: 14.0    5437-7647 [employer].   Care instructions adapted under license by your healthcare professional. If you have questions about a medical condition or this instruction, always ask your healthcare professional. [employer] disclaims any warranty or liability for your use of this information.      Weeks 6 to 10 of Your Pregnancy: Care Instructions  During these weeks of " "pregnancy, your body goes through many changes. You may start to feel different, both in your body and your emotions. Each pregnancy is different, so there's no \"right\" way to feel. These early weeks are a time to make healthy choices for you and your pregnancy.    Take a daily prenatal vitamin. Choose one with folic acid in it.    Avoid alcohol, tobacco, and drugs (including marijuana). If you need help quitting, talk to your doctor.    Drink plenty of liquids.  Be sure to drink enough water. And limit sodas, other sweetened drinks, and caffeine.     Choose foods that are good sources of calcium, iron, and folate.  You can try dairy products, dark leafy greens, fortified orange juice and cereals, almonds, broccoli, dried fruit, and beans.     Avoid foods that may be harmful.  Don't eat raw meat, deli meat, raw seafood, or raw eggs. Avoid soft cheese and unpasteurized dairy, like Brie and blue cheese. And don't eat fish that contains a lot of mercury, like shark and swordfish.     Don't touch bijan litter or cat poop.  They can cause an infection that could be harmful during pregnancy.     Avoid things that can make your body too hot.  For example, avoid hot tubs and saunas.     Soothe morning sickness.  Try eating 5 or 6 small meals a day, getting some fresh air, or using miguel angel to control symptoms.     Ask your doctor about flu and COVID-19 shots.  Getting them can help protect against infection.   Follow-up care is a key part of your treatment and safety. Be sure to make and go to all appointments, and call your doctor if you are having problems. It's also a good idea to know your test results and keep a list of the medicines you take.  Where can you learn more?  Go to [email or web address]/[email or web address]  Enter G112 in the search box to learn more about \"Weeks 6 to 10 of Your Pregnancy: Care Instructions.\"  Current as of: July 10, 2023               Content Version: 14.0    9015-8959 [employer]. "   Care instructions adapted under license by your healthcare professional. If you have questions about a medical condition or this instruction, always ask your healthcare professional. [employer] disclaims any warranty or liability for your use of this information.         Managing Morning Sickness (01:55)  Your health professional recommends that you watch this short online health video.  Learn tips for dealing with morning sickness, no matter what time of day you have it.  Purpose:  Gives tips for managing morning sickness, including eating small low-fat meals and avoiding caffeine and spicy food.  Goal:  The user will learn tips for dealing with morning sickness during pregnancy.     How to watch the video    Scan the QR code   OR Visit the website    [email or web address]   Current as of: July 10, 2023               Content Version: 14.0    1112-3144 [employer].   Care instructions adapted under license by your healthcare professional. If you have questions about a medical condition or this instruction, always ask your healthcare professional. [employer] disclaims any warranty or liability for your use of this information.      Pregnancy and Heartburn: Care Instructions  Overview     Heartburn is a common problem during pregnancy.  Heartburn happens when stomach acid backs up into the tube that carries food to the stomach. This tube is called the esophagus. Early in pregnancy, heartburn is caused by hormone changes that slow down digestion. Later on, it's also caused by the large uterus pushing up on the stomach.  Even though you can't fix the cause, there are things you can do to get relief. Treating heartburn during pregnancy focuses first on making lifestyle changes, like changing what and how you eat, and on taking medicines.  Heartburn usually improves or goes away after childbirth.  Follow-up care is a key part of your treatment and safety. Be sure to make  "and go to all appointments, and call your doctor if you are having problems. It's also a good idea to know your test results and keep a list of the medicines you take.  How can you care for yourself at home?  Eat small, frequent meals.  Avoid foods that make your symptoms worse, such as chocolate, peppermint, and spicy foods. Avoid drinks with caffeine, such as coffee, tea, and sodas.  Avoid bending over or lying down after meals.  Take a short walk after you eat.  If heartburn is a problem at night, do not eat for 2 hours before bedtime.  Take antacids like Mylanta, Maalox, Rolaids, or Tums. Do not take antacids that have sodium bicarbonate, magnesium trisilicate, or aspirin. Be careful when you take over-the-counter antacid medicines. Many of these medicines have aspirin in them. While you are pregnant, do not take aspirin or medicines that contain aspirin unless your doctor says it is okay.  If you're not getting relief, talk to your doctor. You may be able to take a stronger acid-reducing medicine.  When should you call for help?   Call your doctor now or seek immediate medical care if:    You have new or worse belly pain.     You are vomiting.   Watch closely for changes in your health, and be sure to contact your doctor if:    You have new or worse symptoms of reflux.     You are losing weight.     You have trouble or pain swallowing.     You do not get better as expected.   Where can you learn more?  Go to https://www.[employer].net/patiented  Enter U946 in the search box to learn more about \"Pregnancy and Heartburn: Care Instructions.\"  Current as of: July 10, 2023               Content Version: 14.0    9077-6671 [employer].   Care instructions adapted under license by your healthcare professional. If you have questions about a medical condition or this instruction, always ask your healthcare professional. [employer] disclaims any warranty or liability for your use of this " information.      Constipation: Care Instructions  Overview     Constipation means that you have a hard time passing stools (bowel movements). People pass stools from 3 times a day to once every 3 days. What is normal for you may be different. Constipation may occur with pain in the rectum and cramping. The pain may get worse when you try to pass stools. Sometimes there are small amounts of bright red blood on toilet paper or the surface of stools. This is because of enlarged veins near the rectum (hemorrhoids).  A few changes in your diet and lifestyle may help you avoid ongoing constipation. Your doctor may also prescribe medicine to help loosen your stool.  Some medicines can cause constipation. These include pain medicines and antidepressants. Tell your doctor about all the medicines you take. Your doctor may want to make a medicine change to ease your symptoms.  Follow-up care is a key part of your treatment and safety. Be sure to make and go to all appointments, and call your doctor if you are having problems. It's also a good idea to know your test results and keep a list of the medicines you take.  How can you care for yourself at home?  Drink plenty of fluids. If you have kidney, heart, or liver disease and have to limit fluids, talk with your doctor before you increase the amount of fluids you drink.  Include high-fiber foods in your diet each day. These include fruits, vegetables, beans, and whole grains.  Get at least 30 minutes of exercise on most days of the week. Walking is a good choice. You also may want to do other activities, such as running, swimming, cycling, or playing tennis or team sports.  Take a fiber supplement, such as Citrucel or Metamucil, every day. Read and follow all instructions on the label.  Schedule time each day for a bowel movement. A daily routine may help. Take your time having a bowel movement, but don't sit for more than 10 minutes at a time. And don't strain too  "much.  Support your feet with a small step stool when you sit on the toilet. This helps flex your hips and places your pelvis in a squatting position.  Your doctor may recommend an over-the-counter laxative to relieve your constipation. Examples are Milk of Magnesia and MiraLax. Read and follow all instructions on the label. Do not use laxatives on a long-term basis.  When should you call for help?   Call your doctor now or seek immediate medical care if:    You have new or worse belly pain.     You have new or worse nausea or vomiting.     You have blood in your stools.   Watch closely for changes in your health, and be sure to contact your doctor if:    Your constipation is getting worse.     You do not get better as expected.   Where can you learn more?  Go to https://www.[email or web address].net/patiented  Enter P343 in the search box to learn more about \"Constipation: Care Instructions.\"  Current as of: October 19, 2023               Content Version: 14.0    0414-4889 [employer].   Care instructions adapted under license by your healthcare professional. If you have questions about a medical condition or this instruction, always ask your healthcare professional. [employer] disclaims any warranty or liability for your use of this information.      Learning About High-Iron Foods  What foods are high in iron?     The foods you eat contain nutrients, such as vitamins and minerals. Iron is a nutrient. Your body needs the right amount to stay healthy and work as it should. You can use the list below to help you make choices about which foods to eat.  Here are some foods that contain iron. They have 1 to 2 milligrams of iron per serving.  Fruits  Figs (dried), 5 figs  Vegetables  Asparagus (canned), 6 solis  Pavel, beet, Swiss chard, or turnip greens, 1 cup  Dried peas, cooked,   cup  Seaweed, spirulina (dried),   cup  Spinach, (cooked)   cup or (raw) 1 cup  Grains  Cereals, fortified with iron, 1 " "cup  Grits (instant, cooked), fortified with iron,   cup  Meats and other protein foods  Beans (kidney, lima, navy, white), canned or cooked,   cup  Beef or lamb, 3 oz  Chicken giblets, 3 oz  Chickpeas (garbanzo beans),   cup  Liver of beef, lamb, or pork, 3 oz  Oysters (cooked), 3 oz  Sardines (canned), 3 oz  Soybeans (boiled),   cup  Tofu (firm),   cup  Work with your doctor to find out how much of this nutrient you need. Depending on your health, you may need more or less of it in your diet.  Where can you learn more?  Go to https://www.[employer]oquo.net/patiented  Enter R005 in the search box to learn more about \"Learning About High-Iron Foods.\"  Current as of: September 20, 2023               Content Version: 14.0    4676-6869 [employer].   Care instructions adapted under license by your healthcare professional. If you have questions about a medical condition or this instruction, always ask your healthcare professional. [employer] disclaims any warranty or liability for your use of this information.      Rh Antibodies Screening During Pregnancy: About This Test  What is it?     The Rh antibodies screening test is a blood test. It checks your blood for Rh antibodies. If you have Rh-negative blood and have been exposed to Rh-positive blood, your immune system may make antibodies to attack the Rh-positive blood. When a pregnant woman has these antibodies, it is called Rh sensitization.  Why is this test done?  The Rh antibodies screening test is done during pregnancy to find out if your baby is at risk for Rh disease. This can happen if you have Rh-negative blood and your baby has Rh-positive blood. If your Rh-negative blood mixes with Rh-positive blood, your immune system will make antibodies to attack the Rh-positive blood.  During pregnancy, these antibodies could attach to the baby's red blood cells. This can cause your baby to have serious health problems. The results of this test " "will help your doctor know how to best care for you and your baby during your pregnancy.  How do you prepare for the test?  In general, there's nothing you have to do before this test, unless your doctor tells you to.  How is the test done?  A health professional uses a needle to take a blood sample, usually from the arm.  What happens after the test?  You will probably be able to go home right away. It depends on the reason for the test.  You can go back to your usual activities right away.  Follow-up care is a key part of your treatment and safety. Be sure to make and go to all appointments, and call your doctor if you are having problems. It's also a good idea to keep a list of the medicines you take. Ask your doctor when you can expect to have your test results.  Where can you learn more?  Go to [email or web address]  Enter P722 in the search box to learn more about \"Rh Antibodies Screening During Pregnancy: About This Test.\"  Current as of: July 10, 2023               Content Version: 14.0    7820-7360 [employer].   Care instructions adapted under license by your healthcare professional. If you have questions about a medical condition or this instruction, always ask your healthcare professional. [employer] disclaims any warranty or liability for your use of this information.      Learning About Preventing Rh Disease  What is Rh disease?     Rh disease can be a serious problem in pregnancy. It happens when substances called antibodies in the mother's blood cause red blood cells in her baby's blood to be destroyed. This can occur when the blood types of a mother and her baby do not match.  All blood has an Rh factor. This is what makes a blood type positive or negative. When you are Rh-negative, your baby may be Rh-negative or Rh-positive. If your baby has Rh-positive blood and it mixes with yours, your body will make antibodies. This is called Rh sensitization.  Most of " "the time, this is not a problem in a first pregnancy. But in future pregnancies, it could cause Rh disease.  A  with Rh disease has mild anemia and may have jaundice. In severe cases, anemia, jaundice, and swelling can be very dangerous or fatal. Some babies need to be delivered early. Some need special care in the NICU. A very sick baby will need a blood transfusion before or after birth.  Fortunately, Rh sensitization is usually easy to prevent.  That's why it's important to get your Rh status checked in your first trimester. It doesn't cause any warning signs. A blood test is the only way to know if you are Rh-sensitive or are at risk for it.  How can you prevent Rh disease?  If you are Rh-negative, your doctor gives you an Rh immune globulin shot (such as RhoGAM). It helps prevent your body from making the antibodies that attack your baby's red blood cells.  Timing is important. You need the shot at certain times during your pregnancy. And you need one anytime there is a chance that your baby's blood might mix with yours. That can happen with certain prenatal tests or when you have pregnancy bleeding, such as:  Right after any pregnancy loss, amniocentesis, or CVS testing.  After turning of a breech baby.  Before and maybe after childbirth. Your doctor gives you a shot around week 28. If your  is Rh-positive, you will have another shot.  Follow-up care is a key part of your treatment and safety. Be sure to make and go to all appointments, and call your doctor if you are having problems. It's also a good idea to know your test results and keep a list of the medicines you take.  Where can you learn more?  Go to https://[email or web address]/[email or web address]  Enter W177 in the search box to learn more about \"Learning About Preventing Rh Disease.\"  Current as of: July 10, 2023               Content Version: 14.0    8791-8233 [employer].   Care instructions adapted under license by [facility] healthcare " professional. If you have questions about a medical condition or this instruction, always ask your healthcare professional. Healthwise, [facility] disclaims any warranty or liability for your use of this information.      Learning About Rh Immunoglobulin Shots  Introduction     An Rh immunoglobulin shot is given to pregnant women who have Rh-negative blood.  You may have Rh-negative blood, and your baby may have Rh-positive blood. If the two types of blood mix, your body will make antibodies. This is called Rh sensitization. Most of the time, this is not a problem the first time you're pregnant. But it could cause problems in future pregnancies.  This shot keeps your body from making the antibodies. You get the shot around 28 weeks of pregnancy. After the birth, your baby's blood is tested. If the blood is Rh positive, you will get another shot. You may also get the shot if you have vaginal bleeding while you are pregnant or if you have a miscarriage. These shots protect future pregnancies.  Women with Rh negative blood will need this shot each time they get pregnant.  Example  Rh immunoglobulin (HypRho-D, MICRhoGAM, and RhoGAM)  Possible side effects  Rare side effects may include:  Some mild pain where you got the shot.  A slight fever.  An allergic reaction.  You may have other side effects not listed here. Check the information that comes with your medicine.  What to know about taking this medicine  You may need more than one shot. You may need the shot again:  After amniocentesis, fetal blood sampling, or chorionic villus sampling tests.  If you have bleeding in your second or third trimester.  After turning of a breech baby.  After an injury to the belly while you are pregnant.  After a miscarriage or an .  Before or right after treatment for an ectopic or a partial molar pregnancy.  Tell your doctor if you have any allergies or have had a bad response to medicines in the past.  If you get this shot  "within 3 months of getting a live-virus vaccine, the vaccine may not work. Your doctor will tell you if you need more vaccine.  Check with your doctor or pharmacist before you use any other medicines. This includes over-the-counter medicines. Make sure your doctor knows all of the medicines, vitamins, herbs, and supplements you take. Taking some medicines at the same time can cause problems.  Where can you learn more?  Go to https://www.[employer].Invisible/patiented  Enter V615 in the search box to learn more about \"Learning About Rh Immunoglobulin Shots.\"  Current as of: July 10, 2023               Content Version: 14.0    9742-3062 [employer]   Care instructions adapted under license by your healthcare professional. If you have questions about a medical condition or this instruction, always ask your healthcare professional. [employer] disclaims any warranty or liability for your use of this information.      Rubella (Ivorian Measles): Care Instructions  Overview  Rubella, also called Ivorian measles or 3-day measles, is a disease caused by a virus. It spreads by coughs, sneezes, and close contact. Rubella usually is mild and does not cause long-term problems. But if you are pregnant and get it, you can give the disease to your unborn baby. This can cause serious birth defects.  While you have rubella, you may get a rash and a mild fever, and the lymph glands in your neck may swell. Older children often have a fever, eye pain, a sore throat, and body aches. You can relieve most symptoms with care at home. Avoid being around others, especially pregnant people, until your rash has been gone for at least 4 days. People who have not had this disease before or have not had the vaccine have the greatest chance of getting the virus.  Follow-up care is a key part of your treatment and safety. Be sure to make and go to all appointments, and call your doctor if you are having problems. It's also a good " "idea to know your test results and keep a list of the medicines you take.  How can you care for yourself at home?  Drink plenty of fluids. If you have kidney, heart, or liver disease and have to limit fluids, talk with your doctor before you increase the amount of fluids you drink.  Get plenty of rest to help your body heal.  Take an over-the-counter pain medicine, such as acetaminophen (Tylenol), ibuprofen (Advil, Motrin), or naproxen (Aleve), to reduce fever and discomfort. Read and follow all instructions on the label. Do not give aspirin to anyone younger than 20. It has been linked to Reye syndrome, a serious illness.  Do not take two or more pain medicines at the same time unless the doctor told you to. Many pain medicines have acetaminophen, which is Tylenol. Too much acetaminophen (Tylenol) can be harmful.  Try not to scratch the rash. Put cold, wet cloths on the rash to reduce itching.  Do not smoke. Smoking can make your symptoms worse. If you need help quitting, talk to your doctor about stop-smoking programs and medicines. These can increase your chances of quitting for good.  Avoid contact with people who have never had rubella and who have not been immunized.  When should you call for help?   Call your doctor now or seek immediate medical care if:    You have a fever with a stiff neck or a severe headache.     You are sensitive to light or feel very sleepy or confused.   Watch closely for changes in your health, and be sure to contact your doctor if:    You do not get better as expected.   Where can you learn more?  Go to https://[email or web address].Machina.net/patiented  Enter B812 in the search box to learn more about \"Rubella (Al[place] Measles): Care Instructions.\"  Current as of: June 12, 2023               Content Version: 14.0    9909-9741 [employer].   Care instructions adapted under license by your healthcare professional. If you have questions about a medical condition or this instruction, " always ask your healthcare professional. [employer], [facility] disclaims any warranty or liability for your use of this information.      Gonorrhea and Chlamydia: About These Tests  What is it?  These tests use a sample of urine or other body fluid to look for the bacteria that cause these sexually transmitted infections (STIs). The fluid sample can come from the cervix, vagina, rectum, throat, or eyes.  Why is this test done?  These tests may be done to:  Find out if symptoms are caused by gonorrhea or chlamydia.  Check people who are at high risk of being infected with gonorrhea or chlamydia.  Retest people several months after they have been treated for gonorrhea or chlamydia.  Check for infection in your  if you had a gonorrhea or chlamydia infection at the time of delivery.  How can you prepare for the test?  If you are going to have a urine test, do not urinate for at least 1 hour before the test.  If you think you may have chlamydia or gonorrhea, don't have sexual intercourse until you get your test results. And you may want to have tests for other STIs, such as HIV.  How is the test done?  For a direct sample, a swab is used to collect body fluid from the cervix, vagina, rectum, throat, or eyes. Your doctor may collect the sample. Or you may be given instructions on how to collect your own sample.  For a urine sample, you will collect the urine that comes out when you first start to urinate. Don't wipe the genital area clean before you urinate.  How long does the test take?  The test will take a few minutes.  What happens after the test?  You will be able to go home right away.  You can go back to your usual activities right away.  If you do have an infection, don't have sexual intercourse for 7 days after you start treatment. And your sex partner(s) should also be treated.  Follow-up care is a key part of your treatment and safety. Be sure to make and go to all appointments, and call your doctor  "if you are having problems. It's also a good idea to keep a list of the medicines you take. Ask your doctor when you can expect to have your test results.  Where can you learn more?  Go to https://[email or web address].net/patiented  Enter K976 in the search box to learn more about \"Gonorrhea and Chlamydia: About These Tests.\"  Current as of: November 27, 2023               Content Version: 14.0    7017-6262 [employer].   Care instructions adapted under license by your healthcare professional. If you have questions about a medical condition or this instruction, always ask your healthcare professional. [employer] disclaims any warranty or liability for your use of this information.      Trichomoniasis: About This Test  What is it?     This test uses a sample of urine or other body fluid to look for the tiny parasite that causes trichomoniasis (also called trich). The fluid sample can come from the vagina, cervix, or urethra. Your doctor may choose to use one or more of many available tests.  Why is it done?  A trich test may be done to:  Find out if symptoms are caused by trich.  Check people who are at high risk for being infected with trich.  Check after treatment to make sure that the infection is gone.  How do you prepare for the test?  If you are going to have a urine test, do not urinate for at least 1 hour before the test.  How is the test done?  For a direct sample, a swab is used to collect body fluid from the cervix, vagina, or urethra. Your doctor may collect the sample. Or you may be given instructions on how to collect your own sample.  For a urine sample, you will collect the urine that comes out when you first start to urinate. Don't wipe the area clean before you urinate.  How long does the test take?  It will take a few minutes to collect a sample.  What happens after the test?  You can go home right away.  You can go back to your usual activities right away.  You may get the " test results the same day or several days later. It depends on the test used.  If you do have an infection, don't have sexual intercourse for 7 days after you start treatment. Your sex partner or partners should also be treated.  Follow-up care is a key part of your treatment and safety. Be sure to make and go to all appointments, and call your doctor if you are having problems. Ask your doctor when you can expect to have your test results.  Current as of: November 27, 2023               Content Version: 14.0    2967-6000 [employer].   Care instructions adapted under license by your healthcare professional. If you have questions about a medical condition or this instruction, always ask your healthcare professional. [employer] disclaims any warranty or liability for your use of this information.      HIV Testing: Care Instructions  Overview  You can get tested for the human immunodeficiency virus (HIV). Most doctors use a blood test to check for HIV antibodies and antigens in your blood. It may also check for the genetic material (RNA) of HIV. Some tests use saliva to check for HIV antibodies. But these aren't as accurate. For example, they may give a false result if you've just been infected.  What do the results mean?    Normal (negative)    No HIV antibodies, antigens, or RNA were found.  You may need more testing. It can make sure your test results are correct.    Uncertain (indeterminate)    Test results didn't clearly show if you have an HIV infection.  HIV antibodies or antigens may not have formed yet.  Some other type of antibody or antigen may have affected the results.  You will need another test to be sure.    Abnormal (positive)    HIV antibodies, antigens, or RNA were found.  If you haven't had an RNA test yet, one will be done. If it's positive, you have HIV.  If your test result is positive, your doctor will talk to you. You will discuss starting treatment.  Follow-up care  "is a key part of your treatment and safety. Be sure to make and go to all appointments, and call your doctor if you are having problems. It's also a good idea to know your test results and keep a list of the medicines you take.  Where can you learn more?  Go to [email or web address]://[email or web address]/patiented  Enter T792 in the search box to learn more about \"HIV Testing: Care Instructions.\"  Current as of: June 12, 2023               Content Version: 14.0    6702-4242 [employer].   Care instructions adapted under license by your healthcare professional. If you have questions about a medical condition or this instruction, always ask your healthcare professional. [employer] disclaims any warranty or liability for your use of this information.      Hepatitis C Virus Tests: About These Tests  What are they?     Hepatitis C virus tests are blood tests that check for substances in the blood that show whether you have hepatitis C now or had it in the past. The tests can also tell you what type of hepatitis C you have and how severe the disease is. This can help your doctor with treatment.  If the tests show that you have long-term hepatitis C, you need to take steps to prevent spreading the disease.  Why are these tests done?  You may need these tests if:  You have symptoms of hepatitis.  You may have been exposed to the virus. You are more likely to have been exposed to the virus if you inject drugs or are exposed to body fluids (such as if you are a health care worker).  You've had other tests that show you have liver problems.  You are 18 to 79 years old.  You have an HIV infection.  The tests also are done to help your doctor decide about your treatment and see how well it works.  How do you prepare for the test?  In general, there's nothing you have to do before this test, unless your doctor tells you to.  How is the test done?  A health professional uses a needle to take a blood sample, usually from " "the arm.  What happens after these tests?  You will probably be able to go home right away.  You can go back to your usual activities right away.  Follow-up care is a key part of your treatment and safety. Be sure to make and go to all appointments, and call your doctor if you are having problems. It's also a good idea to keep a list of the medicines you take. Ask your doctor when you can expect to have your test results.  Where can you learn more?  Go to https://www.RiskIQ.net/patiented  Enter W551 in the search box to learn more about \"Hepatitis C Virus Tests: About These Tests.\"  Current as of: June 12, 2023               Content Version: 14.0    2729-3286 [employer].   Care instructions adapted under license by your healthcare professional. If you have questions about a medical condition or this instruction, always ask your healthcare professional. [employer] disclaims any warranty or liability for your use of this information.      Learning About Fetal Ultrasound Results  What is a fetal ultrasound?     Fetal ultrasound is a test that lets your doctor see an image of your baby. Your doctor learns information about your baby from this picture. You may find out, for example, if you are having a boy or a girl. But the main reason you have this test is to get information about your baby's health.  (You may hear your baby called a fetus. This is a common medical term for a baby that's growing in the mother's uterus.)  What kind of information can you learn from this test?  The findings of an ultrasound fall into two categories, normal and abnormal.  Normal  The fetus is the right size for its age.  The placenta is the expected size and does not cover the cervix.  There is enough amniotic fluid in the uterus.  No birth defects can be seen.  Abnormal  The fetus is small or large for its age.  The placenta covers the cervix.  There is too much or too little amniotic fluid in the " uterus.  The fetus may have a birth defect.  What does an abnormal result mean?  Abnormal seems to imply that something is wrong with your baby. But what it means is that the test has shown something the doctor wants to take a closer look at.  And that's what happens next. Your doctor will talk to you about what further test or tests you may need.  What do the results mean?  Some of the things your doctor may see on an abnormal ultrasound include:  Echogenic bowel.  The bowel looks very bright on the screen. This could mean that there's blood in the bowel. Or it could mean that something is blocking the small bowel.  Increased nuchal translucency.  The ultrasound measures the thickness at the back of the baby's neck. An increase in thickness is sometimes an early sign of Down syndrome.  Increased or decreased amniotic fluid.  The doctor will look for a reason for the level of amniotic fluid and will watch the pregnancy closely as it progresses.  Large ventricles.  Ventricles in the brain look larger than they should. Your doctor may take a closer look at the brain.  Renal pyelectasis/hydronephrosis.  The ultrasound measures the fluid around the kidney. If there is more fluid than expected, there is a chance of urinary tract or kidney problems.  Short long bones.  The ultrasound measures certain arm and leg bones. A long bone (humerus or femur) that is shorter than average could be a sign of Down syndrome.  Subchorionic hemorrhage.  An ultrasound can show bleeding under one of the membranes that surrounds the fetus. Some women don't have symptoms of bleeding. The ultrasound can find this problem when women are not bleeding from their vagina. Women who have this condition have a slightly higher chance of miscarriage.  What do you do now?  Take a deep breath, and let it out. Keep in mind that an abnormal finding on an ultrasound, after it's coupled with more information, may:  Turn out to be nothing.  Turn out to be  "something mild that won't affect the baby.  Turn out to be something more serious. But if this happens, early diagnosis helps you and your doctor plan treatment options sooner rather than later.  Your medical team is there for you. So are your family and friends. Ask questions, and get the help and support you need.  Follow-up care is a key part of your treatment and safety. Be sure to make and go to all appointments, and call your doctor if you are having problems. It's also a good idea to know your test results and keep a list of the medicines you take.  Where can you learn more?  Go to https://www.SoundCure.net/patiented  Enter K451 in the search box to learn more about \"Learning About Fetal Ultrasound Results.\"  Current as of: July 10, 2023               Content Version: 14.0    2614-9021 [employer].   Care instructions adapted under license by your healthcare professional. If you have questions about a medical condition or this instruction, always ask your healthcare professional. [employer] disclaims any warranty or liability for your use of this information.      Learning About Prenatal Visits  Overview     Regular prenatal visits are very important during any pregnancy. These quick office visits may seem simple and routine. But they can help you have a safe and healthy pregnancy. Your doctor is watching for problems that can only be found through regular checkups. The visits also give you and your doctor time to build a good relationship.  After your first visit, you will most likely start on a schedule of monthly visits. In your third trimester, the visits will get more frequent. Based on your health, your age, and if you've had a normal, full-term pregnancy before, your doctor may want to see you more or less often.  At different times in your pregnancy, you will have exams and tests. Some are routine. Others are done only when there is a chance of a problem. Everything healthy " you do for your body helps you have a healthy pregnancy. Rest when you need it. Eat well, drink plenty of water, and exercise regularly.  What happens during a prenatal visit?  You will have blood pressure checks, along with urine tests. You also may have blood tests. If you need to go to the bathroom while waiting for the doctor, tell the nurse. You will be given a sample cup so your urine can be tested.  You will be weighed and have your belly measured.  Your doctor may listen to the fetal heartbeat with a special device.  At about 24 weeks, and possibly earlier in your pregnancy, your doctor will check your blood sugar (glucose tolerance test) for diabetes that can occur during pregnancy. This is gestational diabetes, which can be harmful.  You will have tests to check for infections that could harm your . These include group B streptococcus and hepatitis B.  Your doctor may do ultrasounds to check for problems. This also checks the position of the fetus. An ultrasound uses sound waves to produce a picture of the fetus.  You may get your vaccines updated.  Your doctor may ask you questions to check for signs of anxiety or depression. Tell your doctor if you feel sad, anxious, or hopeless for more than a few days.  You may have other tests at any time during your pregnancy.  Use your visits to discuss with your doctor any concerns you have.  How can you care for yourself at home?  Get plenty of rest.  Try to exercise every day, if your doctor says it is okay. If you have not exercised in the past, start out slowly. For example, you can take short walks each day.  Choose healthy foods, such as fruits, vegetables, whole grains, lean proteins, low-fat dairy, and healthy fats.  Drink plenty of fluids. Cut down on drinks with caffeine, such as coffee, tea, and cola. If you have kidney, heart, or liver disease and have to limit fluids, talk with your doctor before you increase the amount of fluids you drink.  Try  "to avoid chemical fumes, paint fumes, and poisons.  If you smoke, vape, or use alcohol, marijuana, or other drugs, quit or cut back as much as you can. Talk to your doctor if you need help quitting.  Review all of your medicines, including over-the-counter medicines and supplements, with your doctor. Some of your routine medicines may need to be changed. Do not stop or start taking any medicines without talking to your doctor first.  Follow-up care is a key part of your treatment and safety. Be sure to make and go to all appointments, and call your doctor if you are having problems. It's also a good idea to know your test results and keep a list of the medicines you take.  Where can you learn more?  Go to https://www.Gertrude.net/patiented  Enter J502 in the search box to learn more about \"Learning About Prenatal Visits.\"  Current as of: July 10, 2023               Content Version: 14.0    0714-7052 [employer].   Care instructions adapted under license by your healthcare professional. If you have questions about a medical condition or this instruction, always ask your healthcare professional. [employer] disclaims any warranty or liability for your use of this information.      Intimate Partner Violence: Care Instructions  Overview     If you want to save this information but don't think it is safe to take it home, see if a trusted friend can keep it for you. Plan ahead. Know who you can call for help, and memorize the phone number.   Be careful online too. Your online activity may be seen by others. Do not use your personal computer or device to read about this topic. Use a safe computer, such as one at work, a friend's home, or a library.    Intimate partner violence--a type of domestic abuse--is different from an argument now and then. It is a pattern of abuse that one person may use to control another person's behavior. It may start with threats and name-calling. Then, it may lead to " more serious acts, like pushing and slapping. The abuse also may occur in other areas. For example, the abuser may withhold money or spend a partner's money without their knowledge.  Abuse can cause serious harm. You are more likely to have a long-term health problem from the injuries and stress of living in a violent relationship. People who are sexually abused by their partners have more sexually transmitted infections and unplanned pregnancies. Anyone who is abused also faces emotional pain. Anyone can be abused in relationships. In some relationships, both people use abusive behavior.  If you are pregnant, abuse can cause problems such as poor weight gain, infections, and bleeding. Abuse during this time may increase your baby's risk of low birth weight, premature birth, and death.  Follow-up care is a key part of your treatment and safety. Be sure to make and go to all appointments, and call your doctor if you are having problems. It's also a good idea to know your test results and keep a list of the medicines you take.  How can you care for yourself at home?  If you do not have a safe place to stay, discuss this with your doctor before you leave.  Have a plan for where to go, how to leave your home, and where to stay in case of an emergency. Do not tell your partner about your plan. Contact:  The National Domestic Violence Hotline toll-free at 1-984.684.5877. They can help you find resources in your area.  Your local police department, hospital, or clinic for information about shelters and safe homes near you.  Talk to a trusted friend or neighbor, a counselor, or a benigno leader. Do not feel that you have to hide what happened.  Teach your children how to call for help in an emergency.  Be alert to warning signs, such as threats, heavy alcohol use, or drug use. This can help you avoid danger.  If you can, make sure that there are no guns or other weapons in your home.  When should you call for help?   Call 048  "anytime you think you may need emergency care. For example, call if:    You or someone else has just been abused.     You think you or someone else is in danger of being abused.   Watch closely for changes in your health, and be sure to contact your doctor if you have any problems.  Where can you learn more?  Go to https://www.Yunzhisheng.net/patiented  Enter G282 in the search box to learn more about \"Intimate Partner Violence: Care Instructions.\"  Current as of: June 24, 2023               Content Version: 14.0    9194-5629 [employer]   Care instructions adapted under license by your healthcare professional. If you have questions about a medical condition or this instruction, always ask your healthcare professional. [employer] disclaims any warranty or liability for your use of this information.      Intimate Partner Violence Safety Instructions: Care Instructions  Overview     If you want to save this information but don't think it is safe to take it home, see if a trusted friend can keep it for you. Plan ahead. Know who you can call for help, and memorize the phone number.   Be careful online too. Your online activity may be seen by others. Do not use your personal computer or device to read about this topic. Use a safe computer, such as one at work, a friend's home, or a library.    When you are abused by a spouse or partner, you can take actions to protect yourself and your children.  You can increase your safety whether you decide to stay with your spouse or partner or you decide to leave. You may want to make a safety plan and pack a bag ahead of time. This will help you leave quickly when you decide to. Remember, you cannot change your partner's actions, but you can find help for you and your children. No one deserves to be abused.  Follow-up care is a key part of your treatment and safety. Be sure to make and go to all appointments, and call your doctor if you are having " problems. It's also a good idea to know your test results and keep a list of the medicines you take.  How can you care for yourself at home?  Make a plan for your safety   If you decide to stay with your abusive spouse or partner, you can do the following to increase your safety:  Decide what works best to keep you safe in an emergency.  Know who you can call to help you in an emergency.  Decide if you will call the police if you get hurt again. If you can, agree on a signal with your children or neighbor to call the police for you if you need help. You can flash lights or hang something out of a window.  Choose a safe place to go for a short time if you need to leave home. Memorize the address and phone number.  Learn escape routes out of your home in case you need to leave in a hurry. Teach your children different ways to get out of your home quickly if they need to.  If you can, hide or lock up things that can be used as weapons (guns, knives, hammers).  Learn the number of a domestic violence shelter. Talk to the people there about how they can help.  Find out about other community resources that can help you.  Take pictures of bruises or other injuries if you can. You can also take pictures of things your abuser has broken.  Teach your children that violence is never okay. Tell them that they do not deserve to be hurt.  Pack a bag   Prepare a bag with things you will need if you leave suddenly. Leave it with a friend, a relative, or someone else you trust. You could include the following items in the bag:  A set of keys to your home and car.  Emergency phone numbers and addresses.  Money such as cash or checks. You can also ask a friend, a relative, or someone else you trust to hold money for you.  Copies of legal documents such as house and car titles or rent receipts, birth certificates, Social Security card, voter registration, marriage and 's licenses, and your children's health records.  Personal  "items you would need for a few days, such as clothes, a toothbrush, toothpaste, and any medicines you or your children need.  A favorite toy or book for your child or children.  Diapers and bottles, if you have very young children.  Pictures that show signs of abuse and violence. You may also add pictures of your abuser.  If you leave   If you decide to leave, you can take the following steps:  Go to the emergency room at a hospital if you have been hurt.  Think about asking the police to be with you as you leave. They can protect you as you leave your home.  If you decide to leave secretly, remember that activities can be tracked. Your abuser may still have access to your cell phone, email, and credit cards. It may be possible for these to be traced. Always be aware of your surroundings.  If this is an emergency, do not worry about gathering up anything. Just leave--your safety is most important.  If your abuser moves out, change the locks on the doors. If you have a security system, change the access code.  When should you call for help?   Call 911 anytime you think you may need emergency care. For example, call if:    You or someone else has just been abused.     You think you or someone else is in danger of being abused.   Watch closely for changes in your health, and be sure to contact your doctor if you have any problems.  Where can you learn more?  Go to https://www.[employer].net/patiented  Enter A752 in the search box to learn more about \"Intimate Partner Violence Safety Instructions: Care Instructions.\"  Current as of: June 24, 2023               Content Version: 14.0    2016-4907 [employer].   Care instructions adapted under license by your healthcare professional. If you have questions about a medical condition or this instruction, always ask your healthcare professional. [employer] disclaims any warranty or liability for your use of this information.      Learning About " Intimate Partner Violence  What is intimate partner violence?  Intimate partner violence is a type of domestic abuse. It's threatening, emotionally harmful, or violent behavior in a personal relationship. It can happen between past or current partners or spouses. In some relationships both people abuse each other. One partner may be more abusive. Or the abuse may be equal.  Abuse can affect people of any ethnic group, race, or Jewish. It can affect teens, adults, or the elderly. And it can happen to people of any sexual orientation, gender, or social status.  Abusers use fear, bullying, and threats to control their partners. They may control what their partners do. They may control where their partners go or who they see. They may act jealous, controlling, or possessive. These early signs of abuse may happen soon after the start of the relationship. Sometimes it can be hard to notice abuse at first. But after the relationship becomes more serious, the abuse may get worse.  If you are being abused in your relationship, it's important to get help. The abuse is not your fault. You don't have to face it alone.  Be careful  It may not be safe to take home domestic abuse information like this handout. Some people ask a trusted friend to keep it for them. It's also important to plan ahead and to memorize the phone number of places you can go for help. If you are concerned about your safety, do not use your computer, smartphone, or tablet to read about domestic abuse.   What are the types of intimate partner violence?  Abuse can happen in different ways. Each type can happen on its own or in combination with others.  Emotional abuse  Emotional abuse is a pattern of threats, insults, or controlling behavior. It includes verbal abuse. It goes beyond healthy disagreements in a relationship. It's a sign of an unhealthy relationship.  Do you feel threatened, intimidated, or controlled?  Does your partner:  Threaten your  children, other family members, or pets?  Use jokes meant to embarrass or shame you?  Call you names?  Tell you that you are a bad parent?  Threaten to take away your children?  Threaten to have you or your family members deported?  Control your access to money or other basic needs?  Control what you do, who you see or talk to, or where you go?  Another form of emotional abuse is denying that it is happening. Or the abuser may act like the abuse is no big deal or is your fault.  Sexual abuse  With sexual abuse, abusers may try to convince or force you to have sex. They may force you into sex acts you're not comfortable with. Or they may sexually assault you. Sexual abuse can happen even if you are in a committed relationship.  Physical abuse  Physical abuse means that a partner hits, kicks, or does something else to physically hurt you. Physical abuse that starts with a slap might lead to kicking, shoving, and choking over time. The abuser may also threaten to hurt or kill you.  Stalking  Stalking means that an abuser gives you attention that you do not want and that causes you fear. Examples of stalking include:  Following you.  Showing up at places where the abuser isn't invited, such as at your work or school.  Constantly calling or texting you.  What problems can  to?  Intimate partner violence can be very dangerous. It can cause serious, repeated injury. It can even lead to death.  All forms of abuse can cause long-term health problems from the stress of a violent relationship. Verbal abuse can lead to sexual and physical abuse.  Abuse causes:  Emotional pain.  Depression.  Anxiety.  Post-traumatic stress.  Sexual abuse can lead to sexually transmitted infections (such as HIV/AIDS) and unplanned pregnancy.  Pregnancy can be a very dangerous time for people in abusive relationships. Abuse can cause or increase the risk of problems during pregnancy. These include low weight gain, anemia, infections, and  "bleeding. Abuse may also increase your baby's risk of low birth weight, premature birth, and death.  It can be hard for some victims of abuse to ask for help or to leave their relationship. You may feel scared, stuck, or not sure what steps to take. But it's important not to ignore abuse. Talking to someone you trust could be the first step to ending the abuse and taking care of your own health and happiness again. There are resources available that can help keep you safe.  Where can you get help?  Talk to a trusted friend. Find a local advocacy group, or talk to your doctor about the abuse.  Contact the National Domestic Violence Hotline at 2-248-292-FLSR (1-608.500.9135) for more safety tips. They can guide you to groups in your area that can help. Or go to the National Coalition Against Domestic Violence website at www.thehotlCellARide.org to learn more.  Domestic violence groups or a counselor in your area can help you make a safety plan for yourself and your children.  When to call for help  Call 911 anytime you think you may need emergency care. For example, call if:  You think that you or someone you know is in danger of being abused.  You have been hurt and can't have someone safely take you to emergency care.  You have just been abused.  A family member has just been abused.  Where can you learn more?  Go to https://www.[employer].net/patiented  Enter S665 in the search box to learn more about \"Learning About Intimate Partner Violence.\"  Current as of: June 24, 2023               Content Version: 14.0    6721-3784 [employer].   Care instructions adapted under license by your healthcare professional. If you have questions about a medical condition or this instruction, always ask your healthcare professional. [employer] disclaims any warranty or liability for your use of this information.      Vaginal Bleeding During Pregnancy: Care Instructions  Overview     It's common to have some " vaginal spotting when you are pregnant. In some cases, the bleeding isn't serious. And there aren't any more problems with the pregnancy.  But sometimes bleeding is a sign of a more serious problem. This is more common if the bleeding is heavy or painful. Examples of more serious problems include miscarriage, an ectopic pregnancy, and a problem with the placenta.  You may have to see your doctor again to be sure everything is okay. You may also need more tests to find the cause of the bleeding.  Home treatment may be all you need. But it depends on what is causing the bleeding. Be sure to tell your doctor if you have any new symptoms or if your symptoms get worse.  The doctor has checked you carefully, but problems can develop later. If you notice any problems or new symptoms, get medical treatment right away.  Follow-up care is a key part of your treatment and safety. Be sure to make and go to all appointments, and call your doctor if you are having problems. It's also a good idea to know your test results and keep a list of the medicines you take.  How can you care for yourself at home?  If your doctor prescribed medicines, take them exactly as directed. Call your doctor if you think you are having a problem with your medicine.  Do not have vaginal sex until your doctor says it's okay.  Do not put anything in your vagina until your doctor says it's okay.  Ask your doctor about other activities you can or can't do.  Get a lot of rest. Being pregnant can make you tired.  Do not use nonsteroidal anti-inflammatory drugs (NSAIDs), such as ibuprofen (Advil, Motrin), naproxen (Aleve), or aspirin, unless your doctor says it is okay.  When should you call for help?   Call 911 anytime you think you may need emergency care. For example, call if:    You passed out (lost consciousness).     You have severe vaginal bleeding. This means you are soaking through a pad each hour for 2 or more hours.     You have sudden, severe pain  "in your belly or pelvis.   Call your doctor now or seek immediate medical care if:    You have new or worse vaginal bleeding.     You are dizzy or lightheaded, or you feel like you may faint.     You have pain in your belly, pelvis, or lower back.     You think that you are in labor.     You have a sudden release of fluid from your vagina.     You've been having regular contractions for an hour. This means that you've had at least 8 contractions within 1 hour or at least 4 contractions within 20 minutes, even after you change your position and drink fluids.     You notice that your baby has stopped moving or is moving much less than normal.   Watch closely for changes in your health, and be sure to contact your doctor if you have any problems.  Where can you learn more?  Go to https://www.[employer].net/patiented  Enter N829 in the search box to learn more about \"Vaginal Bleeding During Pregnancy: Care Instructions.\"  Current as of: July 10, 2023               Content Version: 14.0    1497-1173 [employer].   Care instructions adapted under license by your healthcare professional. If you have questions about a medical condition or this instruction, always ask your healthcare professional. [employer] disclaims any warranty or liability for your use of this information.      Weeks 10 to 14 of Your Pregnancy: Care Instructions  It's now possible to hear the fetus's heartbeat with a special ultrasound device. And the fetus's organs are developing.    Decide about tests to check for birth defects. Think about your age, your chance of passing on a family disease, your need to know about any problems, and what you might do after you have the test results.    It's okay to exercise. Try activities such as walking or swimming. Check with your doctor before starting a new program.    You may feel more tired than usual.  Taking naps during the day may help.     You may feel emotional.  It might help " "to talk to someone.     You may have headaches.  Try lying down and putting a cool cloth over your forehead.     You can use acetaminophen (Tylenol) for pain relief.  Don't take any anti-inflammatory medicines (such as Advil, Motrin, Aleve), unless your doctor says it's okay.     You may feel a fullness or aching in your lower belly.  This can feel like the kind of cramps you might get before a period. A back rub may help.     You may need to urinate more.  Your growing uterus and changing hormones can affect your bladder.     You may feel sick to your stomach (morning sickness).  Try avoiding food and smells that make you feel sick.     Your breasts may feel different.  They may feel tender or get bigger. Your nipples may get darker. Try a bra that gives you good support.     Avoid alcohol, tobacco, and drugs (including marijuana).  If you need help quitting, talk to your doctor.     Take a daily prenatal vitamin.  Choose one with folic acid.   Follow-up care is a key part of your treatment and safety. Be sure to make and go to all appointments, and call your doctor if you are having problems. It's also a good idea to know your test results and keep a list of the medicines you take.  Where can you learn more?  Go to https://www.[employer].net/patiented  Enter E090 in the search box to learn more about \"Weeks 10 to 14 of Your Pregnancy: Care Instructions.\"  Current as of: July 10, 2023               Content Version: 14.0    2971-3814 [employer].   Care instructions adapted under license by your healthcare professional. If you have questions about a medical condition or this instruction, always ask your healthcare professional. [employer] disclaims any warranty or liability for your use of this information.        Nutrition During Pregnancy: Care Instructions  Overview     Healthy eating when you are pregnant is important for you and your baby. It can help you feel well and have a " successful pregnancy and delivery. During pregnancy your nutrition needs increase. Even if you have excellent eating habits, your doctor may recommend a multivitamin to make sure you get enough iron and folic acid.  You may wonder how much weight you should gain. In general, if you were at a healthy weight before you became pregnant, then you should gain between 25 and 35 pounds. If you were overweight before pregnancy, then you'll likely be advised to gain 15 to 25 pounds. If you were underweight before pregnancy, then you'll probably be advised to gain 28 to 40 pounds. Your doctor will work with you to set a weight goal that is right for you. Gaining a healthy amount of weight helps you have a healthy baby.  Follow-up care is a key part of your treatment and safety. Be sure to make and go to all appointments, and call your doctor if you are having problems. It's also a good idea to know your test results and keep a list of the medicines you take.  How can you care for yourself at home?  Eat plenty of fruits and vegetables. Include a variety of orange, yellow, and leafy dark-green vegetables every day.  Choose whole-grain bread, cereal, and pasta. Good choices include whole wheat bread, whole wheat pasta, brown rice, and oatmeal.  Get 4 or more servings of milk and milk products each day. Good choices include nonfat or low-fat milk, yogurt, and cheese. If you cannot eat milk products, you can get calcium from calcium-fortified products such as orange juice, soy milk, and tofu. Other non-milk sources of calcium include leafy green vegetables, such as broccoli, kale, mustard greens, turnip greens, bok cheyenne, and brussels sprouts.  If you eat meat, pick lower-fat types. Good choices include lean cuts of meat and chicken or turkey without the skin.  Avoid fish that are high in mercury. These include shark, swordfish, mallika mackerel, marlin, orange roughy, and bigeye tuna, as well as tilefish from the [place] of  [place].  It's okay to eat up to 8 to 12 ounces a week of fish that are low in mercury or up to 4 ounces a week of fish that have medium levels of mercury. Some fish that are low in mercury are salmon, shrimp, canned light tuna, cod, and tilapia. Some fish that have medium levels of mercury are halibut and white albacore tuna.  For more advice about eating fish, you can visit the U.S. Food and Drug Administration (FDA) or U.S. Environmental Protection Agency (EPA) website.  Heat lunch meats (such as turkey, ham, or bologna) to 165 F before you eat them. This reduces your risk of getting sick from a kind of bacteria that can be found in lunch meats.  Do not eat unpasteurized soft cheeses, such as brie, feta, fresh mozzarella, and blue cheese. They have a bacteria that could harm your baby.  Limit caffeine to about 200 to 300 mg per day. On average, a cup of brewed coffee has around 80 to 100 mg of caffeine.  Do not drink any alcohol. No amount of alcohol has been found to be safe during pregnancy.  Do not diet or try to lose weight. For example, do not follow a low-carbohydrate diet. If you are overweight at the start of your pregnancy, your doctor will work with you to manage your weight gain.  Tell your doctor about all vitamins and supplements you take.  When should you call for help?  Watch closely for changes in your health, and be sure to contact your doctor if you have any problems.  Where can you learn more?  Go to https://www.[employer].net/patiented  Enter Y785 in the search box to learn more about \"Nutrition During Pregnancy: Care Instructions.\"  Current as of: September 20, 2023               Content Version: 14.0    4206-2367 [employer].   Care instructions adapted under license by your healthcare professional. If you have questions about a medical condition or this instruction, always ask your healthcare professional. [employer] disclaims any warranty or liability for your use " of this information.      Exercise During Pregnancy: Care Instructions  Overview     Exercise is good for you during a healthy pregnancy. It can relieve back pain, swelling, and other discomforts. It also prepares your muscles for childbirth. And exercise can improve your energy level and help you sleep better.  If your doctor advises it, get more exercise. For example, walking is a good choice. Bit by bit, increase the amount you walk every day. Try for at least 30 minutes on most days of the week. You could also try a prenatal exercise class. But if you do not already exercise, be sure to talk with your doctor before you start a new exercise program. Doctors do not recommend contact sports during pregnancy.  Follow-up care is a key part of your treatment and safety. Be sure to make and go to all appointments, and call your doctor if you are having problems. It's also a good idea to know your test results and keep a list of the medicines you take.  How can you care for yourself at home?  Talk with your doctor about the right kind of exercise for each stage of pregnancy.  Listen to your body to know if your exercise is at a safe level.  Do not become overheated while you exercise. High body temperature can be harmful. Avoid activities that can make your body too hot.  If you feel tired, take it easy. You might walk instead of run.  If you are used to strenuous exercise, ask your doctor how to know when it's time to slow down.  If you exercised before getting pregnant, you should be able to keep up your routine early in your pregnancy. Later in your pregnancy, you may want to switch to more gentle activities.  Drink plenty of fluids before, during, and after exercise.  Avoid contact sports, such as soccer and basketball. Also avoid risky activities. These include scuba diving, horseback riding, and exercising at a high altitude (above 6,000 feet). If you live in a place with a high altitude, talk to your doctor about  "how you can exercise safely.  Do not get overtired while you exercise. You should be able to talk while you work out.  After your fourth month of pregnancy, avoid exercises that require you to lie flat on your back on a hard surface. These include sit-ups and some yoga poses.  Get plenty of rest. You may be very tired while you are pregnant.  Where can you learn more?  Go to https://[email or web address]".net/patiented  Enter S801 in the search box to learn more about \"Exercise During Pregnancy: Care Instructions.\"  Current as of: July 10, 2023               Content Version: 14.0    7541-1018 [employer].   Care instructions adapted under license by your healthcare professional. If you have questions about a medical condition or this instruction, always ask your healthcare professional. [employer] disclaims any warranty or liability for your use of this information.      Learning About Pregnancy and Obesity  How does your weight affect your pregnancy?     The basics of prenatal care are the same for everyone, regardless of size. You'll get what you need to have a healthy baby.  But your size can make a difference in a few things. You and your doctor will have to watch your pregnancy weight. Your weight may affect your labor and delivery.  You may have some extra doctor visits and tests. And you may have some tests earlier in your pregnancy. You'll need to pay close attention to things like blood pressure and the chance of getting gestational diabetes. (This is a type of diabetes that sometimes happens during pregnancy.) And close attention will be given to your developing baby.  Work with your doctor to get the care you need. Go to all your doctor visits, and follow your doctor's advice about what to do and what to avoid during pregnancy.  How much weight gain is healthy?  If you are very overweight (obese), experts recommend that you gain between 11 and 20 pounds. Your doctor will work with you " "to set a weight goal that's right for you. In some cases, your doctor may recommend that you not gain any weight.  How much extra food do you need to eat?  Although you may joke that you're \"eating for two\" during pregnancy, you don't need to eat twice as much food. How much you can eat depends on:  Your height.  How much you weigh when you get pregnant.  How active you are.  If you're carrying more than one fetus (multiple pregnancy).  In the first trimester, you'll probably need the same amount of calories as you did before you were pregnant. In general, in your second trimester, you need to eat about 340 extra calories a day. In your third trimester, you need to eat about 450 extra calories a day.  What can you do to have a healthy pregnancy?  The best things you can do for you and your baby are to eat healthy foods, get regular exercise, avoid alcohol and smoking, and go to your doctor visits.  Eat a variety of foods from all the food groups. Make sure to get enough calcium and folic acid.  You may want to work with a dietitian to help you plan healthy meals to get the right amount of calories for you.  If you didn't exercise much before you got pregnant, talk to your doctor about how you can slowly get more active. Your doctor may want to set up an exercise program with you.  Where can you learn more?  Go to https://www.[employer].net/patiented  Enter B644 in the search box to learn more about \"Learning About Pregnancy and Obesity.\"  Current as of: July 10, 2023               Content Version: 14.0    5846-7377 [employer].   Care instructions adapted under license by your healthcare professional. If you have questions about a medical condition or this instruction, always ask your healthcare professional. [employer] disclaims any warranty or liability for your use of this information.      You have been provided the CDC Warning Signs in Pregnancy document.    Additional copies can be " found here: [email or web address][email or web address]

## 2024-06-11 NOTE — PROGRESS NOTES
Denied need for review of teaching  [facility] OB Intake Nurse    Patient supplied answers from flow sheet for:  Prenatal OB Questionnaire.  Past Medical History  Have you ever recieved care for your mental health? : No  Have you ever been in a major accident or suffered serious trauma?: No  Within the last year, has anyone hit, slapped, kicked or otherwise hurt you?: No  In the last year, has anyone forced you to have sex when you didn't want to?: No    Past Medical History 2   Have you ever received a blood transfusion?: No  Would you accept a blood transfusion if was medically recommended?: Yes  Does anyone in your home smoke?: No   Is your blood type Rh negative?: No  Have you ever ?: (!) Yes  Have you been hospitalized for a nonsurgical reason excluding normal delivery?: No  Have you ever had an abnormal pap smear?: No    Past Medical History (Continued)  Do you have a history of abnormalities of the uterus?: No  Did your mother take LATHA or any other hormones when she was pregnant with you?: No  Do you have any other problems we have not asked about which you feel may be important to this pregnancy?: No

## 2024-06-26 ENCOUNTER — APPOINTMENT (OUTPATIENT)
Dept: CT IMAGING | Facility: CLINIC | Age: 34
End: 2024-06-26
Attending: EMERGENCY MEDICINE
Payer: COMMERCIAL

## 2024-06-26 ENCOUNTER — HOSPITAL ENCOUNTER (EMERGENCY)
Facility: CLINIC | Age: 34
Discharge: HOME | End: 2024-06-27
Attending: EMERGENCY MEDICINE | Admitting: EMERGENCY MEDICINE
Payer: COMMERCIAL

## 2024-06-26 DIAGNOSIS — N23 RENAL COLIC ON RIGHT SIDE: ICD-10-CM

## 2024-06-26 LAB
ALBUMIN SERPL BCG-MCNC: 4.3 G/DL (ref 3.5–5.2)
ALBUMIN UR-MCNC: 30 MG/DL
ALP SERPL-CCNC: 58 U/L (ref 40–150)
ALT SERPL W P-5'-P-CCNC: 23 U/L (ref 0–50)
ANION GAP SERPL CALCULATED.3IONS-SCNC: 14 MMOL/L (ref 7–15)
APPEARANCE UR: ABNORMAL
AST SERPL W P-5'-P-CCNC: 26 U/L (ref 0–45)
BASOPHILS # BLD AUTO: 0.1 10E3/UL (ref 0–0.2)
BASOPHILS NFR BLD AUTO: 1 %
BILIRUB SERPL-MCNC: 0.3 MG/DL
BILIRUB UR QL STRIP: NEGATIVE
BUN SERPL-MCNC: 7.9 MG/DL (ref 6–20)
CALCIUM SERPL-MCNC: 8.7 MG/DL (ref 8.6–10)
CHLORIDE SERPL-SCNC: 103 MMOL/L (ref 98–107)
COLOR UR AUTO: YELLOW
CREAT SERPL-MCNC: 0.78 MG/DL (ref 0.51–0.95)
DEPRECATED HCO3 PLAS-SCNC: 20 MMOL/L (ref 22–29)
EGFRCR SERPLBLD CKD-EPI 2021: >90 ML/MIN/1.73M2
EOSINOPHIL # BLD AUTO: 0.1 10E3/UL (ref 0–0.7)
EOSINOPHIL NFR BLD AUTO: 1 %
ERYTHROCYTE [DISTWIDTH] IN BLOOD BY AUTOMATED COUNT: 12.6 % (ref 10–15)
GLUCOSE SERPL-MCNC: 93 MG/DL (ref 70–99)
GLUCOSE UR STRIP-MCNC: NEGATIVE MG/DL
HCG SERPL QL: NEGATIVE
HCT VFR BLD AUTO: 36.7 % (ref 35–47)
HGB BLD-MCNC: 13 G/DL (ref 11.7–15.7)
HGB UR QL STRIP: ABNORMAL
HOLD SPECIMEN: NORMAL
HOLD SPECIMEN: NORMAL
IMM GRANULOCYTES # BLD: 0 10E3/UL
IMM GRANULOCYTES NFR BLD: 0 %
KETONES UR STRIP-MCNC: NEGATIVE MG/DL
LEUKOCYTE ESTERASE UR QL STRIP: NEGATIVE
LYMPHOCYTES # BLD AUTO: 3.1 10E3/UL (ref 0.8–5.3)
LYMPHOCYTES NFR BLD AUTO: 28 %
MCH RBC QN AUTO: 31.3 PG (ref 26.5–33)
MCHC RBC AUTO-ENTMCNC: 35.4 G/DL (ref 31.5–36.5)
MCV RBC AUTO: 88 FL (ref 78–100)
MONOCYTES # BLD AUTO: 0.8 10E3/UL (ref 0–1.3)
MONOCYTES NFR BLD AUTO: 7 %
MUCOUS THREADS #/AREA URNS LPF: PRESENT /LPF
NEUTROPHILS # BLD AUTO: 6.8 10E3/UL (ref 1.6–8.3)
NEUTROPHILS NFR BLD AUTO: 63 %
NITRATE UR QL: NEGATIVE
NRBC # BLD AUTO: 0 10E3/UL
NRBC BLD AUTO-RTO: 0 /100
PH UR STRIP: 5 [PH] (ref 5–7)
PLATELET # BLD AUTO: 283 10E3/UL (ref 150–450)
POTASSIUM SERPL-SCNC: 3.7 MMOL/L (ref 3.4–5.3)
PROT SERPL-MCNC: 7.4 G/DL (ref 6.4–8.3)
RBC # BLD AUTO: 4.16 10E6/UL (ref 3.8–5.2)
RBC URINE: >182 /HPF
SODIUM SERPL-SCNC: 137 MMOL/L (ref 135–145)
SP GR UR STRIP: 1.02 (ref 1–1.03)
SQUAMOUS EPITHELIAL: 1 /HPF
UROBILINOGEN UR STRIP-MCNC: NORMAL MG/DL
WBC # BLD AUTO: 10.9 10E3/UL (ref 4–11)
WBC URINE: 8 /HPF

## 2024-06-26 PROCEDURE — 96374 THER/PROPH/DIAG INJ IV PUSH: CPT

## 2024-06-26 PROCEDURE — 85025 COMPLETE CBC W/AUTO DIFF WBC: CPT | Performed by: EMERGENCY MEDICINE

## 2024-06-26 PROCEDURE — 74176 CT ABD & PELVIS W/O CONTRAST: CPT

## 2024-06-26 PROCEDURE — 80053 COMPREHEN METABOLIC PANEL: CPT | Performed by: EMERGENCY MEDICINE

## 2024-06-26 PROCEDURE — 250N000011 HC RX IP 250 OP 636: Mod: JZ | Performed by: EMERGENCY MEDICINE

## 2024-06-26 PROCEDURE — 36415 COLL VENOUS BLD VENIPUNCTURE: CPT | Performed by: EMERGENCY MEDICINE

## 2024-06-26 PROCEDURE — 81001 URINALYSIS AUTO W/SCOPE: CPT | Performed by: EMERGENCY MEDICINE

## 2024-06-26 PROCEDURE — 84703 CHORIONIC GONADOTROPIN ASSAY: CPT | Performed by: EMERGENCY MEDICINE

## 2024-06-26 PROCEDURE — 99285 EMERGENCY DEPT VISIT HI MDM: CPT | Performed by: EMERGENCY MEDICINE

## 2024-06-26 PROCEDURE — 96361 HYDRATE IV INFUSION ADD-ON: CPT

## 2024-06-26 PROCEDURE — 258N000003 HC RX IP 258 OP 636: Performed by: EMERGENCY MEDICINE

## 2024-06-26 PROCEDURE — 99285 EMERGENCY DEPT VISIT HI MDM: CPT | Mod: 25

## 2024-06-26 PROCEDURE — 96375 TX/PRO/DX INJ NEW DRUG ADDON: CPT

## 2024-06-26 RX ORDER — ONDANSETRON 2 MG/ML
4 INJECTION INTRAMUSCULAR; INTRAVENOUS ONCE
Status: COMPLETED | OUTPATIENT
Start: 2024-06-26 | End: 2024-06-26

## 2024-06-26 RX ORDER — HYDROMORPHONE HYDROCHLORIDE 1 MG/ML
0.5 INJECTION, SOLUTION INTRAMUSCULAR; INTRAVENOUS; SUBCUTANEOUS ONCE
Status: COMPLETED | OUTPATIENT
Start: 2024-06-26 | End: 2024-06-26

## 2024-06-26 RX ADMIN — ONDANSETRON 4 MG: 2 INJECTION INTRAMUSCULAR; INTRAVENOUS at 22:23

## 2024-06-26 RX ADMIN — SODIUM CHLORIDE 1000 ML: 9 INJECTION, SOLUTION INTRAVENOUS at 23:27

## 2024-06-26 RX ADMIN — HYDROMORPHONE HYDROCHLORIDE 0.5 MG: 1 INJECTION, SOLUTION INTRAMUSCULAR; INTRAVENOUS; SUBCUTANEOUS at 23:25

## 2024-06-26 ASSESSMENT — COLUMBIA-SUICIDE SEVERITY RATING SCALE - C-SSRS
2. HAVE YOU ACTUALLY HAD ANY THOUGHTS OF KILLING YOURSELF IN THE PAST MONTH?: NO
1. IN THE PAST MONTH, HAVE YOU WISHED YOU WERE DEAD OR WISHED YOU COULD GO TO SLEEP AND NOT WAKE UP?: NO
6. HAVE YOU EVER DONE ANYTHING, STARTED TO DO ANYTHING, OR PREPARED TO DO ANYTHING TO END YOUR LIFE?: NO

## 2024-06-26 ASSESSMENT — ACTIVITIES OF DAILY LIVING (ADL): ADLS_ACUITY_SCORE: 35

## 2024-06-27 ENCOUNTER — APPOINTMENT (OUTPATIENT)
Dept: GENERAL RADIOLOGY | Facility: CLINIC | Age: 34
End: 2024-06-27
Attending: EMERGENCY MEDICINE
Payer: COMMERCIAL

## 2024-06-27 VITALS
BODY MASS INDEX: 39.31 KG/M2 | SYSTOLIC BLOOD PRESSURE: 129 MMHG | TEMPERATURE: 98.1 F | DIASTOLIC BLOOD PRESSURE: 85 MMHG | HEIGHT: 59 IN | RESPIRATION RATE: 22 BRPM | WEIGHT: 195 LBS | HEART RATE: 62 BPM | OXYGEN SATURATION: 94 %

## 2024-06-27 VITALS
RESPIRATION RATE: 16 BRPM | DIASTOLIC BLOOD PRESSURE: 75 MMHG | HEART RATE: 79 BPM | BODY MASS INDEX: 39.39 KG/M2 | TEMPERATURE: 97.1 F | SYSTOLIC BLOOD PRESSURE: 120 MMHG | HEIGHT: 59 IN | OXYGEN SATURATION: 94 %

## 2024-06-27 DIAGNOSIS — N20.1 URETEROLITHIASIS: ICD-10-CM

## 2024-06-27 DIAGNOSIS — N20.0 NEPHROLITHIASIS: ICD-10-CM

## 2024-06-27 LAB
HOLD SPECIMEN: NORMAL
HOLD SPECIMEN: NORMAL

## 2024-06-27 PROCEDURE — 250N000011 HC RX IP 250 OP 636: Mod: JZ | Performed by: EMERGENCY MEDICINE

## 2024-06-27 PROCEDURE — 99284 EMERGENCY DEPT VISIT MOD MDM: CPT | Performed by: EMERGENCY MEDICINE

## 2024-06-27 PROCEDURE — 99284 EMERGENCY DEPT VISIT MOD MDM: CPT | Mod: 25

## 2024-06-27 PROCEDURE — 96375 TX/PRO/DX INJ NEW DRUG ADDON: CPT

## 2024-06-27 PROCEDURE — 250N000011 HC RX IP 250 OP 636: Performed by: EMERGENCY MEDICINE

## 2024-06-27 PROCEDURE — 258N000003 HC RX IP 258 OP 636: Performed by: EMERGENCY MEDICINE

## 2024-06-27 PROCEDURE — 96376 TX/PRO/DX INJ SAME DRUG ADON: CPT

## 2024-06-27 PROCEDURE — 250N000013 HC RX MED GY IP 250 OP 250 PS 637: Performed by: EMERGENCY MEDICINE

## 2024-06-27 PROCEDURE — 74018 RADEX ABDOMEN 1 VIEW: CPT

## 2024-06-27 RX ORDER — ONDANSETRON 2 MG/ML
4 INJECTION INTRAMUSCULAR; INTRAVENOUS ONCE
Status: COMPLETED | OUTPATIENT
Start: 2024-06-27 | End: 2024-06-27

## 2024-06-27 RX ORDER — TAMSULOSIN HYDROCHLORIDE 0.4 MG/1
0.4 CAPSULE ORAL DAILY
Qty: 10 CAPSULE | Refills: 0 | Status: SHIPPED | OUTPATIENT
Start: 2024-06-27 | End: 2024-07-07

## 2024-06-27 RX ORDER — TAMSULOSIN HYDROCHLORIDE 0.4 MG/1
0.4 CAPSULE ORAL DAILY
Status: DISCONTINUED | OUTPATIENT
Start: 2024-06-27 | End: 2024-06-28 | Stop reason: HOSPADM

## 2024-06-27 RX ORDER — KETOROLAC TROMETHAMINE 15 MG/ML
15 INJECTION, SOLUTION INTRAMUSCULAR; INTRAVENOUS ONCE
Status: COMPLETED | OUTPATIENT
Start: 2024-06-27 | End: 2024-06-27

## 2024-06-27 RX ORDER — ONDANSETRON 4 MG/1
4 TABLET, ORALLY DISINTEGRATING ORAL EVERY 8 HOURS PRN
Qty: 10 TABLET | Refills: 0 | Status: SHIPPED | OUTPATIENT
Start: 2024-06-27

## 2024-06-27 RX ORDER — OXYCODONE HYDROCHLORIDE 5 MG/1
5-10 TABLET ORAL EVERY 6 HOURS PRN
Qty: 15 TABLET | Refills: 0 | Status: SHIPPED | OUTPATIENT
Start: 2024-06-27

## 2024-06-27 RX ORDER — OXYCODONE AND ACETAMINOPHEN 5; 325 MG/1; MG/1
1 TABLET ORAL EVERY 6 HOURS PRN
Qty: 12 TABLET | Refills: 0 | Status: SHIPPED | OUTPATIENT
Start: 2024-06-27

## 2024-06-27 RX ORDER — HYDROMORPHONE HYDROCHLORIDE 1 MG/ML
0.3 INJECTION, SOLUTION INTRAMUSCULAR; INTRAVENOUS; SUBCUTANEOUS ONCE
Status: COMPLETED | OUTPATIENT
Start: 2024-06-27 | End: 2024-06-27

## 2024-06-27 RX ORDER — TAMSULOSIN HYDROCHLORIDE 0.4 MG/1
0.4 CAPSULE ORAL DAILY
Status: DISCONTINUED | OUTPATIENT
Start: 2024-06-28 | End: 2024-06-27

## 2024-06-27 RX ORDER — HYDROMORPHONE HYDROCHLORIDE 1 MG/ML
0.5 INJECTION, SOLUTION INTRAMUSCULAR; INTRAVENOUS; SUBCUTANEOUS ONCE
Status: COMPLETED | OUTPATIENT
Start: 2024-06-27 | End: 2024-06-27

## 2024-06-27 RX ADMIN — KETOROLAC TROMETHAMINE 15 MG: 15 INJECTION, SOLUTION INTRAMUSCULAR; INTRAVENOUS at 01:05

## 2024-06-27 RX ADMIN — TAMSULOSIN HYDROCHLORIDE 0.4 MG: 0.4 CAPSULE ORAL at 22:45

## 2024-06-27 RX ADMIN — ONDANSETRON 4 MG: 2 INJECTION INTRAMUSCULAR; INTRAVENOUS at 00:54

## 2024-06-27 RX ADMIN — KETOROLAC TROMETHAMINE 15 MG: 15 INJECTION, SOLUTION INTRAMUSCULAR; INTRAVENOUS at 22:21

## 2024-06-27 RX ADMIN — SODIUM CHLORIDE 1000 ML: 9 INJECTION, SOLUTION INTRAVENOUS at 21:45

## 2024-06-27 RX ADMIN — HYDROMORPHONE HYDROCHLORIDE 0.3 MG: 1 INJECTION, SOLUTION INTRAMUSCULAR; INTRAVENOUS; SUBCUTANEOUS at 01:06

## 2024-06-27 RX ADMIN — HYDROMORPHONE HYDROCHLORIDE 0.5 MG: 1 INJECTION, SOLUTION INTRAMUSCULAR; INTRAVENOUS; SUBCUTANEOUS at 22:21

## 2024-06-27 RX ADMIN — ONDANSETRON 4 MG: 2 INJECTION INTRAMUSCULAR; INTRAVENOUS at 21:47

## 2024-06-27 ASSESSMENT — ACTIVITIES OF DAILY LIVING (ADL)
ADLS_ACUITY_SCORE: 35

## 2024-06-27 ASSESSMENT — COLUMBIA-SUICIDE SEVERITY RATING SCALE - C-SSRS
6. HAVE YOU EVER DONE ANYTHING, STARTED TO DO ANYTHING, OR PREPARED TO DO ANYTHING TO END YOUR LIFE?: NO
1. IN THE PAST MONTH, HAVE YOU WISHED YOU WERE DEAD OR WISHED YOU COULD GO TO SLEEP AND NOT WAKE UP?: NO
2. HAVE YOU ACTUALLY HAD ANY THOUGHTS OF KILLING YOURSELF IN THE PAST MONTH?: NO

## 2024-06-27 NOTE — ED TRIAGE NOTES
Right sided flank pain, feels like kidney stones that she's had in the past. Did take 1 tab oxy at 2030 that she had from the last time     Triage Assessment (Adult)       Row Name 06/26/24 2152          Triage Assessment    Airway WDL WDL        Respiratory WDL    Respiratory WDL WDL        Skin Circulation/Temperature WDL    Skin Circulation/Temperature WDL WDL        Cardiac WDL    Cardiac WDL WDL        Peripheral/Neurovascular WDL    Peripheral Neurovascular WDL WDL        Cognitive/Neuro/Behavioral WDL    Cognitive/Neuro/Behavioral WDL WDL

## 2024-06-27 NOTE — ED PROVIDER NOTES
History     Chief Complaint   Patient presents with    Flank Pain     HPI  Franchesca Bowen is a 33 year old female past medical history significant for recent miscarriage and kidney stones who presents emergency department complaining of right flank pain had sudden onset of pain this afternoon and has persisted.  Took oxycodone that she had from previous stone and this did not improve things.  Denies any fevers or chills has not any headache or neck pain denies any chest pain or shortness of breath.  Denies any bowel or bladder dysfunction.    Allergies:  No Known Allergies    Problem List:    Patient Active Problem List    Diagnosis Date Noted    Prenatal care, subsequent pregnancy 2024     Priority: Medium    S/P primary low transverse  2023     Priority: Medium    Anemia due to blood loss, acute 2023     Priority: Medium    Morbid obesity (H) 2023     Priority: Medium    Class 2 obesity without serious comorbidity in adult 2023     Priority: Medium    Prenatal care, first pregnancy 2022     Priority: Medium    Female infertility 2022     Priority: Medium        Past Medical History:    Past Medical History:   Diagnosis Date    Chickenpox        Past Surgical History:    Past Surgical History:   Procedure Laterality Date     SECTION N/A 3/24/2023    Procedure:  SECTION;  Surgeon: Naomi Moore MD;  Location: [facility] OR    MYRINGOTOMY, INSERT TUBE BILATERAL, COMBINED      IN REMOVE TONSILS/ADENOIDS,<11 Y/O      age 9       Family History:    Family History   Problem Relation Age of Onset    Crohn's Disease Father        Social History:  Marital Status:   [2]  Social History     Tobacco Use    Smoking status: Former     Current packs/day: 0.00     Average packs/day: 0.5 packs/day for 7.0 years (3.5 ttl pk-yrs)     Types: Cigarettes     Start date: 2010     Quit date: 2017     Years since quittin.1    Smokeless  "tobacco: Never   Vaping Use    Vaping status: Never Used   Substance Use Topics    Alcohol use: Not Currently    Drug use: Never        Medications:    famotidine (PEPCID) 20 MG tablet  Prenatal Vit-Fe Fumarate-FA (PRENATAL MULTIVITAMIN  PLUS IRON) 27-1 MG TABS          Review of Systems  Per HPI.  Physical Exam   BP: 128/86  Pulse: 78  Temp: 97.1  F (36.2  C)  Resp: 16  Height: 149.9 cm (4' 11\")  SpO2: 97 %      Physical Exam  Vitals and nursing note reviewed.   Constitutional:       Appearance: Normal appearance. She is obese. She is not ill-appearing, toxic-appearing or diaphoretic.      Comments: Mild distress secondary to flank pain.   HENT:      Head: Normocephalic and atraumatic.      Nose: Nose normal.      Mouth/Throat:      Mouth: Mucous membranes are moist.      Pharynx: Oropharynx is clear.   Eyes:      Conjunctiva/sclera: Conjunctivae normal.   Cardiovascular:      Rate and Rhythm: Normal rate and regular rhythm.      Pulses: Normal pulses.      Heart sounds: Normal heart sounds. No murmur heard.  Pulmonary:      Effort: Pulmonary effort is normal.      Breath sounds: Normal breath sounds. No stridor. No wheezing or rhonchi.   Abdominal:      Comments: Soft, nondistended, tender to palpation right flank region.  No anterior abdominal tenderness no guarding or rebound bowel sounds positive no masses or hernia are noted.   Musculoskeletal:         General: Normal range of motion.      Cervical back: Normal range of motion and neck supple.      Right lower leg: No edema.      Left lower leg: No edema.   Skin:     General: Skin is warm and dry.      Findings: No rash.   Neurological:      General: No focal deficit present.      Mental Status: She is alert and oriented to person, place, and time.      Sensory: No sensory deficit.      Motor: No weakness.      Coordination: Coordination normal.   Psychiatric:         Mood and Affect: Mood normal.         ED Course        Procedures              Critical Care " time:  none               Results for orders placed or performed during the hospital encounter of 06/26/24 (from the past 24 hour(s))   UA with Microscopic reflex to Culture    Specimen: Urine, Midstream   Result Value Ref Range    Color Urine Yellow Colorless, Straw, Light Yellow, Yellow    Appearance Urine Cloudy (A) Clear    Glucose Urine Negative Negative mg/dL    Bilirubin Urine Negative Negative    Ketones Urine Negative Negative mg/dL    Specific Gravity Urine 1.020 1.003 - 1.035    Blood Urine Large (A) Negative    pH Urine 5.0 5.0 - 7.0    Protein Albumin Urine 30 (A) Negative mg/dL    Urobilinogen Urine Normal Normal, 2.0 mg/dL    Nitrite Urine Negative Negative    Leukocyte Esterase Urine Negative Negative    Mucus Urine Present (A) None Seen /LPF    RBC Urine >182 (H) <=2 /HPF    WBC Urine 8 (H) <=5 /HPF    Squamous Epithelials Urine 1 <=1 /HPF    Narrative    Urine Culture not indicated   [place] Draw    Narrative    The following orders were created for panel order [place] Draw.  Procedure                               Abnormality         Status                     ---------                               -----------         ------                     Extra Blue Top Tube[[id number]]                              In process                 Extra Red Top Tube[[id number]]                               In process                   Please view results for these tests on the individual orders.   CBC with platelets, differential    Narrative    The following orders were created for panel order CBC with platelets, differential.  Procedure                               Abnormality         Status                     ---------                               -----------         ------                     CBC with platelets and d...[[id number]]                      Final result                 Please view results for these tests on the individual orders.   Comprehensive metabolic panel   Result Value Ref Range    Sodium  137 135 - 145 mmol/L    Potassium 3.7 3.4 - 5.3 mmol/L    Carbon Dioxide (CO2) 20 (L) 22 - 29 mmol/L    Anion Gap 14 7 - 15 mmol/L    Urea Nitrogen 7.9 6.0 - 20.0 mg/dL    Creatinine 0.78 0.51 - 0.95 mg/dL    GFR Estimate >90 >60 mL/min/1.73m2    Calcium 8.7 8.6 - 10.0 mg/dL    Chloride 103 98 - 107 mmol/L    Glucose 93 70 - 99 mg/dL    Alkaline Phosphatase 58 40 - 150 U/L    AST 26 0 - 45 U/L    ALT 23 0 - 50 U/L    Protein Total 7.4 6.4 - 8.3 g/dL    Albumin 4.3 3.5 - 5.2 g/dL    Bilirubin Total 0.3 <=1.2 mg/dL   CBC with platelets and differential   Result Value Ref Range    WBC Count 10.9 4.0 - 11.0 10e3/uL    RBC Count 4.16 3.80 - 5.20 10e6/uL    Hemoglobin 13.0 11.7 - 15.7 g/dL    Hematocrit 36.7 35.0 - 47.0 %    MCV 88 78 - 100 fL    MCH 31.3 26.5 - 33.0 pg    MCHC 35.4 31.5 - 36.5 g/dL    RDW 12.6 10.0 - 15.0 %    Platelet Count 283 150 - 450 10e3/uL    % Neutrophils 63 %    % Lymphocytes 28 %    % Monocytes 7 %    % Eosinophils 1 %    % Basophils 1 %    % Immature Granulocytes 0 %    NRBCs per 100 WBC 0 <1 /100    Absolute Neutrophils 6.8 1.6 - 8.3 10e3/uL    Absolute Lymphocytes 3.1 0.8 - 5.3 10e3/uL    Absolute Monocytes 0.8 0.0 - 1.3 10e3/uL    Absolute Eosinophils 0.1 0.0 - 0.7 10e3/uL    Absolute Basophils 0.1 0.0 - 0.2 10e3/uL    Absolute Immature Granulocytes 0.0 <=0.4 10e3/uL    Absolute NRBCs 0.0 10e3/uL   HCG qualitative pregnancy (blood)   Result Value Ref Range    hCG Serum Qualitative Negative Negative       Medications   sodium chloride 0.9% BOLUS 1,000 mL (has no administration in time range)   HYDROmorphone (PF) (DILAUDID) injection 0.5 mg (has no administration in time range)   ondansetron (ZOFRAN) injection 4 mg (4 mg Intravenous $Given 6/26/24 2223)       Assessments & Plan (with Medical Decision Making) records reviewed including past medical history medications and allergies.  Visit from 12/16/2022 for renal colic was reviewed.  Labs were obtained.  Independently reviewed and  interpreted.  CBC was unremarkable.  Comprehensive metabolic panel with a carbon dioxide of 20 otherwise unremarkable.  Pregnancy test negative.  Urinalysis with large blood 30 protein greater than 182 RBCs 8 WBCs.  CT scan stone protocol was obtained.  Independently reviewed this.  Patient given Zofran and Dilaudid along with a fluid bolus.  CT scan revealed obstructing right distal ureteral 0.4 cm calculus resulting in mild hydronephrosis.  No other abnormality is noted.  Findings discussed with patient.  She continued to have pain and was given second dose of Dilaudid along with Toradol.  She was observed in significant improvement of pain.  Patient feels comfortable going home at this time.  She will be sent home with Zofran and Percocet.  She should drink fluids is much as possible and return if worsening pain fevers decreased urine output or other symptoms present.  She should follow-up with her primary next week for recheck and possible follow-up with urology for further assessment and care.  Patient is agreement this plan.     I have reviewed the nursing notes.    I have reviewed the findings, diagnosis, plan and need for follow up with the patient.             Discharge Medication List as of 6/27/2024  2:39 AM        START taking these medications    Details   ondansetron (ZOFRAN ODT) 4 MG ODT tab Take 1 tablet (4 mg) by mouth every 8 hours as needed for nausea, Disp-10 tablet, R-0, [facility]      oxyCODONE-acetaminophen (PERCOCET) 5-325 MG tablet Take 1 tablet by mouth every 6 hours as needed for severe pain, Disp-12 tablet, R-0, [facility]             Final diagnoses:   Renal colic on right side       6/26/2024   [facility] EMERGENCY DEPT       Earle Johnson MD  07/01/24 3018

## 2024-06-27 NOTE — DISCHARGE INSTRUCTIONS
Return if symptoms worsen or new symptoms develop.  Follow-up with primary care physician next available.  Drink plenty of fluids.  Take pain medication and nausea medication as directed.  If fevers increased abdominal pain decreased urine output or other concerns please return for further evaluation and care.

## 2024-06-28 NOTE — DISCHARGE INSTRUCTIONS
Use ibuprofen 400 mg to 600 mg every 6 hours as needed for kidney stone pain, this relaxes the ureters and decreases spasm and pain.    Use Flomax/tamsulosin to promote passage of stone.    Follow-up in kidney stone clinic.  A referral was made for you.    Use one or more over the counter meds as needed to prevent constipation from opiate analgesic (VOxycodone):     Fiber supplement  Milk of Magnesia  Miralax powder daily (or generic brand)  Dulcolax or Senokot (laxative)  Magnesium Citrate 1 bottle if needed for severe constipation  Fleet Enema if needed for severe constipation    These are available over-the-counter and can be used together or in combination, as needed to prevent constipation.

## 2024-06-28 NOTE — ED TRIAGE NOTES
Pt presents with concerns of right flank pain. Pt states she was seen yesterday in ER and diagnosed with kidney stone. Pt states she last took oxycodone and zofran at 5pm with no relief.      Triage Assessment (Adult)       Row Name 06/27/24 2011          Triage Assessment    Airway WDL WDL        Respiratory WDL    Respiratory WDL WDL        Skin Circulation/Temperature WDL    Skin Circulation/Temperature WDL WDL        Cardiac WDL    Cardiac WDL WDL        Peripheral/Neurovascular WDL    Peripheral Neurovascular WDL WDL        Cognitive/Neuro/Behavioral WDL    Cognitive/Neuro/Behavioral WDL WDL

## 2024-06-28 NOTE — ED PROVIDER NOTES
History     Chief Complaint   Patient presents with    Flank Pain     Right flank     HPI  Franchesca Bowen is a 33 year old female with history of nephrolithiasis and newly diagnosed obstructing 4 mm distal right ureteral stone with mild hydronephrosis on CT yesterday after she presented to the ED after developing acute right flank pain yesterday afternoon, who presents with recurrent severe right flank pain which began this afternoon and is refractory to Percocet 1 tablet p.o.  Yesterday pain was controlled with Dilaudid, and she was given Zofran in the ED and discharged with Rx for Percocet 5/325 mg 1 tablet p.o. every 6 hours as needed for pain, and Rx Zofran.  Yesterday she had an unremarkable laboratory evaluation, including UA only remarkable for hematuria. No fever, chills, nausea or vomiting.  No UTI signs or symptoms or hematuria.    CT yesterday also showed additional nonobstructing right renal stones.  No other pertinent history or acute complaints or concerns.    Previous Records Reviewed:  CT ABDOMEN PELVIS W/O CONTRAST -  2024  KIDNEYS/BLADDER: Few nonobstructing right renal calculi measuring up to 0.4 cm. Obstructing right distal ureteral calculus measuring 0.4 cm. Mild right hydroureteronephrosis. No left nephrolithiasis. No left hydronephrosis. Urinary bladder appears   normal.  IMPRESSION:   1.  Obstructing right distal ureteral 0.4 cm calculus, resulting in mild hydroureteronephrosis.     2.  Additional nonobstructing right intrarenal calculi.     Allergies:  No Known Allergies    Problem List:    Patient Active Problem List    Diagnosis Date Noted    Prenatal care, subsequent pregnancy 2024     Priority: Medium    S/P primary low transverse  2023     Priority: Medium    Anemia due to blood loss, acute 2023     Priority: Medium    Morbid obesity (H) 2023     Priority: Medium    Class 2 obesity without serious comorbidity in adult 2023     Priority:  "Medium    Prenatal care, first pregnancy 2022     Priority: Medium    Female infertility 2022     Priority: Medium        Past Medical History:    Past Medical History:   Diagnosis Date    Chickenpox        Past Surgical History:    Past Surgical History:   Procedure Laterality Date     SECTION N/A 3/24/2023    Procedure:  SECTION;  Surgeon: Naomi Moore MD;  Location: [facility] OR    MYRINGOTOMY, INSERT TUBE BILATERAL, COMBINED      [place] REMOVE TONSILS/ADENOIDS,<11 Y/O      age 9       Family History:    Family History   Problem Relation Age of Onset    Crohn's Disease Father        Social History:  Marital Status:   [2]  Social History     Tobacco Use    Smoking status: Former     Current packs/day: 0.00     Average packs/day: 0.5 packs/day for 7.0 years (3.5 ttl pk-yrs)     Types: Cigarettes     Start date: 2010     Quit date: 2017     Years since quittin.1    Smokeless tobacco: Never   Vaping Use    Vaping status: Never Used   Substance Use Topics    Alcohol use: Not Currently    Drug use: Never        Medications:    oxyCODONE (ROXICODONE) 5 MG tablet  tamsulosin (FLOMAX) 0.4 MG capsule  famotidine (PEPCID) 20 MG tablet  ondansetron (ZOFRAN ODT) 4 MG ODT tab  oxyCODONE-acetaminophen (PERCOCET) 5-325 MG tablet  Prenatal Vit-Fe Fumarate-FA (PRENATAL MULTIVITAMIN  PLUS IRON) 27-1 MG TABS        Review of Systems  As mentioned in the HPI, in addition focused review of systems was negative.    Physical Exam   BP: 122/84  Pulse: 75  Temp: 98.1  F (36.7  C)  Resp: 22  Height: 149.9 cm (4' 11\")  Weight: 88.5 kg (195 lb)  SpO2: 96 %      Physical Exam  Vitals and nursing note reviewed.   Constitutional:       General: She is not in acute distress.     Appearance: Normal appearance. She is well-developed. She is not ill-appearing or diaphoretic.      Comments: Not appear ill or uncomfortable.   HENT:      Head: Normocephalic and atraumatic.   Eyes:      General: No " scleral icterus.     Extraocular Movements: Extraocular movements intact.      Conjunctiva/sclera: Conjunctivae normal.   Neck:      Trachea: No tracheal deviation.   Cardiovascular:      Rate and Rhythm: Normal rate and regular rhythm.      Heart sounds: Normal heart sounds.   Pulmonary:      Effort: Pulmonary effort is normal. No respiratory distress.      Breath sounds: Normal breath sounds.   Abdominal:      Palpations: Abdomen is soft.      Tenderness: There is right CVA tenderness. There is no guarding or rebound.   Musculoskeletal:         General: Normal range of motion.      Cervical back: Normal range of motion and neck supple.      Right lower leg: No edema.      Left lower leg: No edema.   Skin:     General: Skin is warm and dry.      Coloration: Skin is not pale.      Findings: No erythema or rash.   Neurological:      Mental Status: She is alert.   Psychiatric:         Mood and Affect: Mood normal.         Behavior: Behavior normal.         ED Course        Procedures                  Results for orders placed or performed during the hospital encounter of 06/27/24 (from the past 24 hour(s))   Fowler Draw    Narrative    The following orders were created for panel order Fowler Draw.  Procedure                               Abnormality         Status                     ---------                               -----------         ------                     Extra Green Top (Lithium...[[id number]]                      Final result               Extra Purple Top Tube[[id number]]                            Final result                 Please view results for these tests on the individual orders.   Extra Green Top (Lithium Heparin) Tube   Result Value Ref Range    Hold Specimen JIC    Extra Purple Top Tube   Result Value Ref Range    Hold Specimen     XR KUB    Narrative    EXAM: XR KUB  LOCATION: [facility]  DATE: 6/27/2024    INDICATION: Acute recurrent flank pain, diagnosed with  a 4 mm distal right ureteral stone and nonobstructing right renal stones yesterday on CT imaging evaluation.  COMPARISON: CT abdomen and pelvis without IV contrast 6/26/2024.      Impression    IMPRESSION: Right ureteral stone overlying the right sacrum on prior study has migrated further downstream to the mid/lower pelvis, likely adjacent to the ureterovesical junction. Small stone overlying the right mid kidney. Scattered gas and stool   material within normal-caliber colon. Pelvic phleboliths. Anatomic alignment of the bones and joints.       Medications   sodium chloride 0.9% BOLUS 1,000 mL (has no administration in time range)   tamsulosin (FLOMAX) capsule 0.4 mg (0.4 mg Oral $Given 6/27/24 2245)   sodium chloride 0.9% BOLUS 1,000 mL (0 mLs Intravenous Stopped 6/27/24 2333)   ondansetron (ZOFRAN) injection 4 mg (4 mg Intravenous $Given 6/27/24 2147)   ketorolac (TORADOL) injection 15 mg (15 mg Intravenous $Given 6/27/24 2221)   HYDROmorphone (PF) (DILAUDID) injection 0.5 mg (0.5 mg Intravenous $Given 6/27/24 2221)     CT yesterday was also remarkable for a few nonobstructing right renal stones.  She could possibly be passing another stone, but I think this is probably a recurrence or exacerbation of pain from renal/ureteral colic from the 4 mm stone she is passing, documented on CT yesterday.  Through shared decision-making elected defer repeat CT imaging today with deferral of the radiation exposure.  I will proceed with a KUB abdominal film for further assessment and make a referral to kidney stone clinic for her follow-up.    Excellent pain relief with Toradol and Dilaudid.  She was also given a dose of Flomax.    Assessments & Plan (with Medical Decision Making)   33-year-old female passing a 4 mm distal right ureteral stone with mild hydronephrosis diagnosed on CT scan in the ED yesterday who presents for recurrent severe right flank pain this afternoon.  CT yesterday was also remarkable for some  nonobstructing right renal stones. I doubt she is passing a new stone, and if this were the case I doubt this would change her current clinical management and through shared decision making we elected to defer repeat CT imaging again today.  A KUB abdominal film was obtained and it appears the right ureteral stone overlying the right sacrum on CT yesterday has migrated to the mid lower pelvis and is likely adjacent to the ureterovesicular junction. The migration of stone appears to have caused her acute exacerbation of pain.  UA yesterday was negative and I doubt UTI or pyelonephritis.  Her pain was well-controlled with Toradol and Dilaudid and she is comfortable discharge home.  I will have her continue to use NSAID for ureteral colic and spasm and I will prescribe her some more oxycodone and have her use 2 tablets if needed, 10 mg oxycodone per dose instead of 5 mg, and Rx Flomax.  I will make a urology/kidney stone clinic referral for her follow-up.  She was provided instructions for supportive care and will return as needed for worsened condition or worsening symptoms, or new problems or concerns.      I have reviewed the nursing notes.    I have reviewed the findings, diagnosis, plan and need for follow up with the patient.    Medical Decision Making: Moderate complexity      New Prescriptions    OXYCODONE (ROXICODONE) 5 MG TABLET    Take 1-2 tablets (5-10 mg) by mouth every 6 hours as needed for severe pain    TAMSULOSIN (FLOMAX) 0.4 MG CAPSULE    Take 1 capsule (0.4 mg) by mouth daily for 10 days       Final diagnoses:   Ureterolithiasis - 4 mm distal right ureteral stone   Nephrolithiasis - Nonobstructing right renal stone       6/27/2024   [facility] EMERGENCY DEPT       Alison, Rene NAIR MD  06/28/24 [id number]

## 2024-07-02 NOTE — CONFIDENTIAL NOTE
Chief Complaint:   Kidney stones         History of Present Illness:   Franchesca Bowen is a 33 year old female presenting for an evaluation of kidney stones.     The patient presented to the ED on 2024 for right flank pain. UA was notable for >182 RBCs and 8 WBCs. CT abdomen pelvis was notable for a 4 mm right distal ureteral stone with mild right hydronephrosis. Non-obstructing right intrarenal stones were present as well. She returned to the ED on 2024 for pain and was discharged.      The patient reports no pain in the last 2-3 days. She had more significant pain the few days after she left the ED. She denies dysuria, gross hematuria, urinary urgency, nausea, and fevers.     She has a history of kidney stones. She has never required stone clearance procedures.          Past Medical History:     Past Medical History:   Diagnosis Date    Chickenpox             Past Surgical History:     Past Surgical History:   Procedure Laterality Date     SECTION N/A 3/24/2023    Procedure:  SECTION;  Surgeon: Naomi Moore MD;  Location: [facility] OR    MYRINGOTOMY, INSERT TUBE BILATERAL, COMBINED      [place] REMOVE TONSILS/ADENOIDS,<13 Y/O      age 9            Medications     Current Outpatient Medications   Medication Sig Dispense Refill    famotidine (PEPCID) 20 MG tablet Take 20 mg by mouth 2 times daily (Patient not taking: Reported on 2023)      ondansetron (ZOFRAN ODT) 4 MG ODT tab Take 1 tablet (4 mg) by mouth every 8 hours as needed for nausea 10 tablet 0    oxyCODONE (ROXICODONE) 5 MG tablet Take 1-2 tablets (5-10 mg) by mouth every 6 hours as needed for severe pain 15 tablet 0    oxyCODONE-acetaminophen (PERCOCET) 5-325 MG tablet Take 1 tablet by mouth every 6 hours as needed for severe pain 12 tablet 0    Prenatal Vit-Fe Fumarate-FA (PRENATAL MULTIVITAMIN  PLUS IRON) 27-1 MG TABS Take 1 tablet by mouth daily      tamsulosin (FLOMAX) 0.4 MG capsule Take 1 capsule (0.4 mg)  by mouth daily for 10 days 10 capsule 0     No current facility-administered medications for this visit.            Allergies:   Patient has no known allergies.         Review of Systems:  From intake questionnaire   Negative 14 system review except as noted on HPI, nurse's note.         Physical Exam:   Patient is a 33 year old female evaluated via video visit.       Labs and Pathology:    I personally reviewed all applicable laboratory data and went over findings with patient  Significant for:    CBC RESULTS:  Recent Labs   Lab Test 06/26/24  2223 02/05/24  1457 03/25/23  0616 03/21/23  1948 12/15/22  1843 12/12/22  1514   WBC 10.9  --   --  9.0 18.6* 11.3*   HGB 13.0 13.8 9.6* 10.9* 11.0* 11.0*     --   --  198 243 245        BMP RESULTS:  Recent Labs   Lab Test 06/26/24  2223 12/15/22  1843 02/15/22  1224    134* 139   POTASSIUM 3.7 4.5 3.0*   CHLORIDE 103 101 108   CO2 20* 17* 23   ANIONGAP 14 16* 8   GLC 93 89 115*   BUN 7.9 9.3 14   CR 0.78 0.63 0.80   GFRESTIMATED >90 >90 >90   FERNY 8.7 9.2 8.7       UA RESULTS:   Recent Labs   Lab Test 06/26/24  2200 12/15/22  1719 12/15/22  1142   SG 1.020 1.020 >=1.030   URINEPH 5.0 5.0 5.5   NITRITE Negative Negative Negative   RBCU >182* 52* *   WBCU 8* 6* 0-5            Assessment and Plan:   Assessment: Franchesca Bowen is a 33 year old female seen in evaluation for a 4 mm stone in the distal right ureter found on CT from 6/26/2024. She has been essentially pain free for the last 2-3 days.     We discussed a continued trial of passage with plan for follow up CT in 1-2 weeks. If the patient catches her stone, we can cancel the CT. The patient is agreeable.       Plan:  CT abdomen pelvis without contrast to assess for right ureteral stone passage.       Charline Ku PA-C  Department of Urology

## 2024-07-03 ENCOUNTER — VIRTUAL VISIT (OUTPATIENT)
Dept: UROLOGY | Facility: CLINIC | Age: 34
End: 2024-07-03
Payer: COMMERCIAL

## 2024-07-03 DIAGNOSIS — N20.0 NEPHROLITHIASIS: ICD-10-CM

## 2024-07-03 DIAGNOSIS — N20.1 URETEROLITHIASIS: ICD-10-CM

## 2024-07-03 PROCEDURE — 99214 OFFICE O/P EST MOD 30 MIN: CPT | Mod: 95 | Performed by: STUDENT IN AN ORGANIZED HEALTH CARE EDUCATION/TRAINING PROGRAM

## 2024-07-03 NOTE — PROGRESS NOTES
Franchesca Bowen is a 33 year old year old who is being evaluated via a billable video visit.      How would you like to obtain your AVS? MyChart  If the video visit is dropped, the invitation should be resent by: Text to cell phone: [phone]  Will anyone else be joining your video visit? No    Video-Visit Details    Type of service:  Video Visit     Originating Location (pt. Location): Home    Distant Location (provider location):  Off-site  Platform used for Video Visit: EmilyWell

## 2024-09-18 ENCOUNTER — VIRTUAL VISIT (OUTPATIENT)
Dept: OBGYN | Facility: CLINIC | Age: 34
End: 2024-09-18
Payer: COMMERCIAL

## 2024-09-18 VITALS — WEIGHT: 195 LBS | BODY MASS INDEX: 39.31 KG/M2 | HEIGHT: 59 IN

## 2024-09-18 DIAGNOSIS — Z34.80 PRENATAL CARE, SUBSEQUENT PREGNANCY, UNSPECIFIED TRIMESTER: Primary | ICD-10-CM

## 2024-09-18 PROBLEM — Z34.00 PRENATAL CARE, FIRST PREGNANCY: Status: RESOLVED | Noted: 2022-08-11 | Resolved: 2024-09-18

## 2024-09-18 PROCEDURE — 99207 PR NO CHARGE NURSE ONLY: CPT | Mod: 93

## 2024-09-18 NOTE — PROGRESS NOTES
Important Information for Provider: two miscarriages this year.    Telephone visit with patient for New Prenatal Intake and Education. This is patient's 4th pregnancy. Handouts reviewed and will be provided at next prenatal appointment. Scheduled for New Prenatal with [facility] on 10/2. Patient would like to get in sooner for a viability scan-  will contact the patient.       Prenatal OB Questionnaire  Patient supplied answers from flow sheet for:  Patient supplied answers from flow sheet for:  Prenatal OB Questionnaire.  Past Medical History  Have you ever recieved care for your mental health? : No  Have you ever been in a major accident or suffered serious trauma?: No  Within the last year, has anyone hit, slapped, kicked or otherwise hurt you?: No  In the last year, has anyone forced you to have sex when you didn't want to?: No    Past Medical History 2   Have you ever received a blood transfusion?: No  Would you accept a blood transfusion if was medically recommended?: Yes  Does anyone in your home smoke?: No   Is your blood type Rh negative?: No  Have you ever ?: (!) Yes  Have you been hospitalized for a nonsurgical reason excluding normal delivery?: No  Have you ever had an abnormal pap smear?: No    Past Medical History (Continued)  Do you have a history of abnormalities of the uterus?: No  Did your mother take LATHA or any other hormones when she was pregnant with you?: No  Do you have any other problems we have not asked about which you feel may be important to this pregnancy?: No            PHQ-2 Score:         2/5/2024     2:13 PM 5/16/2023     1:17 PM   PHQ-2 ( 1999 [employer])   Q1: Little interest or pleasure in doing things 0 0   Q2: Feeling down, depressed or hopeless 0 0   PHQ-2 Score 0 0   Q1: Little interest or pleasure in doing things Not at all    Q2: Feeling down, depressed or hopeless Not at all    PHQ-2 Score 0         Allergies as of 9/18/2024:    Allergies as of 09/18/2024    (No  Known Allergies)       Current medications are:  Current Outpatient Medications   Medication Sig Dispense Refill    famotidine (PEPCID) 20 MG tablet Take 20 mg by mouth 2 times daily (Patient not taking: Reported on 5/8/2023)      ondansetron (ZOFRAN ODT) 4 MG ODT tab Take 1 tablet (4 mg) by mouth every 8 hours as needed for nausea (Patient not taking: Reported on 7/3/2024) 10 tablet 0    oxyCODONE (ROXICODONE) 5 MG tablet Take 1-2 tablets (5-10 mg) by mouth every 6 hours as needed for severe pain (Patient not taking: Reported on 7/3/2024) 15 tablet 0    oxyCODONE-acetaminophen (PERCOCET) 5-325 MG tablet Take 1 tablet by mouth every 6 hours as needed for severe pain (Patient not taking: Reported on 7/3/2024) 12 tablet 0    Prenatal Vit-Fe Fumarate-FA (PRENATAL MULTIVITAMIN  PLUS IRON) 27-1 MG TABS Take 1 tablet by mouth daily

## 2024-09-18 NOTE — PATIENT INSTRUCTIONS
Learning About Pregnancy  Your Care Instructions     Your health in the early weeks of your pregnancy is particularly important for your baby's health. Take good care of yourself. Anything you do that harms your body can also harm your baby.  Make sure to go to all of your doctor appointments. Regular checkups will help keep you and your baby healthy.  How can you care for yourself at home?  Diet    Choose healthy foods like fruits, vegetables, whole grains, lean proteins, and healthy fats.     Choose foods that are good sources of calcium, iron, and folate. You can try dairy products, dark leafy greens, fortified orange juice and cereals, almonds, broccoli, dried fruit, and beans.     Do not skip meals or go for many hours without eating. If you are nauseated, try to eat a small, healthy snack every 2 to 3 hours.     Avoid fish that are high in mercury. These include shark, swordfish, mallika mackerel, marlin, orange roughy, and bigeye tuna, as well as tilefish from the [facility].     It's okay to eat up to 8 to 12 ounces a week of fish that are low in mercury or up to 4 ounces a week of fish that have medium levels of mercury. Some fish that are low in mercury are salmon, shrimp, canned light tuna, cod, and tilapia. Some fish that have medium levels of mercury are halibut and white albacore tuna.     Drink plenty of fluids. If you have kidney, heart, or liver disease and have to limit fluids, talk with your doctor before you increase the amount of fluids you drink.     Limit caffeine to about 200 to 300 mg per day. On average, a cup of brewed coffee has around 80 to 100 mg of caffeine.     Do not drink alcohol, such as beer, wine, or hard liquor.     Take a multivitamin that contains at least 400 micrograms (mcg) of folic acid to help prevent birth defects. Fortified cereal and whole wheat bread are good additional sources of folic acid.     Increase the calcium in your diet. Try to drink a quart of skim milk  each day. You may also take calcium supplements and choose foods such as cheese and yogurt.   Lifestyle    Make sure you go to your follow-up appointments.     Get plenty of rest. You may be unusually tired while you are pregnant.     Get at least 30 minutes of exercise on most days of the week. Walking is a good choice. If you have not exercised in the past, start out slowly. Take several short walks each day.     Do not smoke. If you need help quitting, talk to your doctor about stop-smoking programs. These can increase your chances of quitting for good.     Do not touch cat feces or litter boxes. Also, wash your hands after you handle raw meat, and fully cook all meat before you eat it. Wear gloves when you work in the yard or garden, and wash your hands well when you are done. Cat feces, raw or undercooked meat, and contaminated dirt can cause an infection that may harm your baby or lead to a miscarriage.     Avoid things that can make your body too hot and may be harmful to your baby, such as a hot tub or sauna. Or talk with your doctor before doing anything that raises your body temperature. Your doctor can tell you if it's safe.     Avoid chemical fumes, paint fumes, or poisons.     Do not use illegal drugs, marijuana, or alcohol.   Medicines    Review all of your medicines with your doctor. Some of your routine medicines may need to be changed to protect your baby.     Use acetaminophen (Tylenol) to relieve minor problems, such as a mild headache or backache or a mild fever with cold symptoms. Do not use nonsteroidal anti-inflammatory drugs (NSAIDs), such as ibuprofen (Advil, Motrin) or naproxen (Aleve), unless your doctor says it is okay.     Do not take two or more pain medicines at the same time unless the doctor told you to. Many pain medicines have acetaminophen, which is Tylenol. Too much acetaminophen (Tylenol) can be harmful.     Take your medicines exactly as prescribed. Call your doctor if you  "think you are having a problem with your medicine.   To manage morning sickness    Keep food in your stomach, but not too much at once. Try eating five or six small meals a day instead of three large meals.     For nausea when you wake up, eat a small snack, such as a couple of crackers or pretzels, before rising. Allow a few minutes for your stomach to settle before you slowly get up.     Try to avoid smells and foods that make you feel nauseated. High-fat or greasy foods, milk, and coffee may make nausea worse. Some foods that may be easier to tolerate include cold, spicy, sour, and salty foods.     Drink enough fluids. Water and other caffeine-free drinks are good choices.     Take your prenatal vitamins at night on a full stomach.     Try foods and drinks made with tiff. Tiff may help with nausea.     Get lots of rest. Morning sickness may be worse when you are tired.     Talk to your doctor about over-the-counter products, such as vitamin B6 or doxylamine, to help relieve symptoms.     Try a P6 acupressure wrist band. These anti-nausea wristbands help some people.   Follow-up care is a key part of your treatment and safety. Be sure to make and go to all appointments, and call your doctor if you are having problems. It's also a good idea to know your test results and keep a list of the medicines you take.  Where can you learn more?  Go to [email or web address]  Enter E868 in the search box to learn more about \"Learning About Pregnancy.\"  Current as of: July 10, 2023               Content Version: 14.0    4132-7680 [employer]   Care instructions adapted under license by your healthcare professional. If you have questions about a medical condition or this instruction, always ask your healthcare professional. [employer] disclaims any warranty or liability for your use of this information.      Weeks 6 to 10 of Your Pregnancy: Care Instructions  During these weeks of " "pregnancy, your body goes through many changes. You may start to feel different, both in your body and your emotions. Each pregnancy is different, so there's no \"right\" way to feel. These early weeks are a time to make healthy choices for you and your pregnancy.    Take a daily prenatal vitamin. Choose one with folic acid in it.    Avoid alcohol, tobacco, and drugs (including marijuana). If you need help quitting, talk to your doctor.    Drink plenty of liquids.  Be sure to drink enough water. And limit sodas, other sweetened drinks, and caffeine.     Choose foods that are good sources of calcium, iron, and folate.  You can try dairy products, dark leafy greens, fortified orange juice and cereals, almonds, broccoli, dried fruit, and beans.     Avoid foods that may be harmful.  Don't eat raw meat, deli meat, raw seafood, or raw eggs. Avoid soft cheese and unpasteurized dairy, like Brie and blue cheese. And don't eat fish that contains a lot of mercury, like shark and swordfish.     Don't touch bijan litter or cat poop.  They can cause an infection that could be harmful during pregnancy.     Avoid things that can make your body too hot.  For example, avoid hot tubs and saunas.     Soothe morning sickness.  Try eating 5 or 6 small meals a day, getting some fresh air, or using miguel angel to control symptoms.     Ask your doctor about flu and COVID-19 shots.  Getting them can help protect against infection.   Follow-up care is a key part of your treatment and safety. Be sure to make and go to all appointments, and call your doctor if you are having problems. It's also a good idea to know your test results and keep a list of the medicines you take.  Where can you learn more?  Go to https://[email or web address]  Enter G112 in the search box to learn more about \"Weeks 6 to 10 of Your Pregnancy: Care Instructions.\"  Current as of: July 10, 2023               Content Version: 14.0    9109-0361 [employer]. "   Care instructions adapted under license by your healthcare professional. If you have questions about a medical condition or this instruction, always ask your healthcare professional. [employer] disclaims any warranty or liability for your use of this information.         Pregnancy: Managing Morning Sickness (01:48)  Your health professional recommends that you watch this short online health video.  Learn how to manage morning sickness during pregnancy.   Purpose:  Goal: Learn how to manage morning sickness during pregnancy.    Watch: Scan the QR code or visit the link to view video       [email or web address]  Current as of: July 10, 2023  Content Version: 14.1 2006-2024 [employer].   Care instructions adapted under license by your healthcare professional. If you have questions about a medical condition or this instruction, always ask your healthcare professional. [employer] disclaims any warranty or liability for your use of this information.    Pregnancy and Heartburn: Care Instructions  Overview     Heartburn is a common problem during pregnancy.  Heartburn happens when stomach acid backs up into the tube that carries food to the stomach. This tube is called the esophagus. Early in pregnancy, heartburn is caused by hormone changes that slow down digestion. Later on, it's also caused by the large uterus pushing up on the stomach.  Even though you can't fix the cause, there are things you can do to get relief. Treating heartburn during pregnancy focuses first on making lifestyle changes, like changing what and how you eat, and on taking medicines.  Heartburn usually improves or goes away after childbirth.  Follow-up care is a key part of your treatment and safety. Be sure to make and go to all appointments, and call your doctor if you are having problems. It's also a good idea to know your test results and keep a list of the medicines you take.  How can you  "care for yourself at home?  Eat small, frequent meals.  Avoid foods that make your symptoms worse, such as chocolate, peppermint, and spicy foods. Avoid drinks with caffeine, such as coffee, tea, and sodas.  Avoid bending over or lying down after meals.  Take a short walk after you eat.  If heartburn is a problem at night, do not eat for 2 hours before bedtime.  Take antacids like Mylanta, Maalox, Rolaids, or Tums. Do not take antacids that have sodium bicarbonate, magnesium trisilicate, or aspirin. Be careful when you take over-the-counter antacid medicines. Many of these medicines have aspirin in them. While you are pregnant, do not take aspirin or medicines that contain aspirin unless your doctor says it is okay.  If you're not getting relief, talk to your doctor. You may be able to take a stronger acid-reducing medicine.  When should you call for help?   Call your doctor now or seek immediate medical care if:    You have new or worse belly pain.     You are vomiting.   Watch closely for changes in your health, and be sure to contact your doctor if:    You have new or worse symptoms of reflux.     You are losing weight.     You have trouble or pain swallowing.     You do not get better as expected.   Where can you learn more?  Go to https://www.[employer].net/patiented  Enter U946 in the search box to learn more about \"Pregnancy and Heartburn: Care Instructions.\"  Current as of: July 10, 2023  Content Version: 14.1 2006-2024 [employer].   Care instructions adapted under license by your healthcare professional. If you have questions about a medical condition or this instruction, always ask your healthcare professional. [employer] disclaims any warranty or liability for your use of this information.    Constipation: Care Instructions  Overview     Constipation means that you have a hard time passing stools (bowel movements). People pass stools from 3 times a day to once every 3 days. " What is normal for you may be different. Constipation may occur with pain in the rectum and cramping. The pain may get worse when you try to pass stools. Sometimes there are small amounts of bright red blood on toilet paper or the surface of stools. This is because of enlarged veins near the rectum (hemorrhoids).  A few changes in your diet and lifestyle may help you avoid ongoing constipation. Your doctor may also prescribe medicine to help loosen your stool.  Some medicines can cause constipation. These include pain medicines and antidepressants. Tell your doctor about all the medicines you take. Your doctor may want to make a medicine change to ease your symptoms.  Follow-up care is a key part of your treatment and safety. Be sure to make and go to all appointments, and call your doctor if you are having problems. It's also a good idea to know your test results and keep a list of the medicines you take.  How can you care for yourself at home?  Drink plenty of fluids. If you have kidney, heart, or liver disease and have to limit fluids, talk with your doctor before you increase the amount of fluids you drink.  Include high-fiber foods in your diet each day. These include fruits, vegetables, beans, and whole grains.  Get at least 30 minutes of exercise on most days of the week. Walking is a good choice. You also may want to do other activities, such as running, swimming, cycling, or playing tennis or team sports.  Take a fiber supplement, such as Citrucel or Metamucil, every day. Read and follow all instructions on the label.  Schedule time each day for a bowel movement. A daily routine may help. Take your time having a bowel movement, but don't sit for more than 10 minutes at a time. And don't strain too much.  Support your feet with a small step stool when you sit on the toilet. This helps flex your hips and places your pelvis in a squatting position.  Your doctor may recommend an over-the-counter laxative to  "relieve your constipation. Examples are Milk of Magnesia and MiraLax. Read and follow all instructions on the label. Do not use laxatives on a long-term basis.  When should you call for help?   Call your doctor now or seek immediate medical care if:    You have new or worse belly pain.     You have new or worse nausea or vomiting.     You have blood in your stools.   Watch closely for changes in your health, and be sure to contact your doctor if:    Your constipation is getting worse.     You do not get better as expected.   Where can you learn more?  Go to [email or web address].net/[email or web address]  Enter P343 in the search box to learn more about \"Constipation: Care Instructions.\"  Current as of: October 19, 2023               Content Version: 14.0    3650-8885 [employer].   Care instructions adapted under license by your healthcare professional. If you have questions about a medical condition or this instruction, always ask your healthcare professional. [employer] disclaims any warranty or liability for your use of this information.      Learning About High-Iron Foods  What foods are high in iron?     The foods you eat contain nutrients, such as vitamins and minerals. Iron is a nutrient. Your body needs the right amount to stay healthy and work as it should. You can use the list below to help you make choices about which foods to eat.  Here are some foods that contain iron. They have 1 to 2 milligrams of iron per serving.  Fruits  Figs (dried), 5 figs  Vegetables  Asparagus (canned), 6 solis  Pavel, beet, Swiss chard, or turnip greens, 1 cup  Dried peas, cooked,   cup  Seaweed, spirulina (dried),   cup  Spinach, (cooked)   cup or (raw) 1 cup  Grains  Cereals, fortified with iron, 1 cup  Grits (instant, cooked), fortified with iron,   cup  Meats and other protein foods  Beans (kidney, lima, navy, white), canned or cooked,   cup  Beef or lamb, 3 oz  Chicken giblets, 3 oz  Chickpeas " "(garbanzo beans),   cup  Liver of beef, lamb, or pork, 3 oz  Oysters (cooked), 3 oz  Sardines (canned), 3 oz  Soybeans (boiled),   cup  Tofu (firm),   cup  Work with your doctor to find out how much of this nutrient you need. Depending on your health, you may need more or less of it in your diet.  Where can you learn more?  Go to [email or web address]  Enter R005 in the search box to learn more about \"Learning About High-Iron Foods.\"  Current as of: September 20, 2023  Content Version: 14.1    5754-9178 [employer].   Care instructions adapted under license by your healthcare professional. If you have questions about a medical condition or this instruction, always ask your healthcare professional. [employer] disclaims any warranty or liability for your use of this information.    Rh Antibodies Screening During Pregnancy: About This Test  What is it?     The Rh antibodies screening test is a blood test. It checks your blood for Rh antibodies. If you have Rh-negative blood and have been exposed to Rh-positive blood, your immune system may make antibodies to attack the Rh-positive blood. When a pregnant woman has these antibodies, it is called Rh sensitization.  Why is this test done?  The Rh antibodies screening test is done during pregnancy to find out if your baby is at risk for Rh disease. This can happen if you have Rh-negative blood and your baby has Rh-positive blood. If your Rh-negative blood mixes with Rh-positive blood, your immune system will make antibodies to attack the Rh-positive blood.  During pregnancy, these antibodies could attach to the baby's red blood cells. This can cause your baby to have serious health problems. The results of this test will help your doctor know how to best care for you and your baby during your pregnancy.  How do you prepare for the test?  In general, there's nothing you have to do before this test, unless your doctor tells you " "to.  How is the test done?  A health professional uses a needle to take a blood sample, usually from the arm.  What happens after the test?  You will probably be able to go home right away. It depends on the reason for the test.  You can go back to your usual activities right away.  Follow-up care is a key part of your treatment and safety. Be sure to make and go to all appointments, and call your doctor if you are having problems. It's also a good idea to keep a list of the medicines you take. Ask your doctor when you can expect to have your test results.  Where can you learn more?  Go to [email or web address]/[email or web address]  Enter P722 in the search box to learn more about \"Rh Antibodies Screening During Pregnancy: About This Test.\"  Current as of: July 10, 2023  Content Version: 14.1    2605-0669 [employer].   Care instructions adapted under license by your healthcare professional. If you have questions about a medical condition or this instruction, always ask your healthcare professional. [employer] disclaims any warranty or liability for your use of this information.    Learning About Preventing Rh Disease  What is Rh disease?     Rh disease can be a serious problem in pregnancy. It happens when substances called antibodies in the mother's blood cause red blood cells in her baby's blood to be destroyed. This can occur when the blood types of a mother and her baby do not match.  All blood has an Rh factor. This is what makes a blood type positive or negative. When you are Rh-negative, your baby may be Rh-negative or Rh-positive. If your baby has Rh-positive blood and it mixes with yours, your body will make antibodies. This is called Rh sensitization.  Most of the time, this is not a problem in a first pregnancy. But in future pregnancies, it could cause Rh disease.  A  with Rh disease has mild anemia and may have jaundice. In severe cases, anemia, jaundice, and swelling can be " "very dangerous or fatal. Some babies need to be delivered early. Some need special care in the NICU. A very sick baby will need a blood transfusion before or after birth.  Fortunately, Rh sensitization is usually easy to prevent.  That's why it's important to get your Rh status checked in your first trimester. It doesn't cause any warning signs. A blood test is the only way to know if you are Rh-sensitive or are at risk for it.  How can you prevent Rh disease?  If you are Rh-negative, your doctor gives you an Rh immune globulin shot (such as RhoGAM). It helps prevent your body from making the antibodies that attack your baby's red blood cells.  Timing is important. You need the shot at certain times during your pregnancy. And you need one anytime there is a chance that your baby's blood might mix with yours. That can happen with certain prenatal tests or when you have pregnancy bleeding, such as:  Right after any pregnancy loss, amniocentesis, or CVS testing.  After turning of a breech baby.  Before and maybe after childbirth. Your doctor gives you a shot around week 28. If your  is Rh-positive, you will have another shot.  Follow-up care is a key part of your treatment and safety. Be sure to make and go to all appointments, and call your doctor if you are having problems. It's also a good idea to know your test results and keep a list of the medicines you take.  Where can you learn more?  Go to https://[email or web address]/[email or web address]  Enter W177 in the search box to learn more about \"Learning About Preventing Rh Disease.\"  Current as of: July 10, 2023  Content Version: 14. [employer].   Care instructions adapted under license by your healthcare professional. If you have questions about a medical condition or this instruction, always ask your healthcare professional. [employer] disclaims any warranty or liability for your use of this information.    Learning About Rh " Immunoglobulin Shots  Introduction     An Rh immunoglobulin shot is given to pregnant women who have Rh-negative blood.  You may have Rh-negative blood, and your baby may have Rh-positive blood. If the two types of blood mix, your body will make antibodies. This is called Rh sensitization. Most of the time, this is not a problem the first time you're pregnant. But it could cause problems in future pregnancies.  This shot keeps your body from making the antibodies. You get the shot around 28 weeks of pregnancy. After the birth, your baby's blood is tested. If the blood is Rh positive, you will get another shot. You may also get the shot if you have vaginal bleeding while you are pregnant or if you have a miscarriage. These shots protect future pregnancies.  Women with Rh negative blood will need this shot each time they get pregnant.  Example  Rh immunoglobulin (HypRho-D, MICRhoGAM, and RhoGAM)  Possible side effects  Rare side effects may include:  Some mild pain where you got the shot.  A slight fever.  An allergic reaction.  You may have other side effects not listed here. Check the information that comes with your medicine.  What to know about taking this medicine  You may need more than one shot. You may need the shot again:  After amniocentesis, fetal blood sampling, or chorionic villus sampling tests.  If you have bleeding in your second or third trimester.  After turning of a breech baby.  After an injury to the belly while you are pregnant.  After a miscarriage or an .  Before or right after treatment for an ectopic or a partial molar pregnancy.  Tell your doctor if you have any allergies or have had a bad response to medicines in the past.  If you get this shot within 3 months of getting a live-virus vaccine, the vaccine may not work. Your doctor will tell you if you need more vaccine.  Check with your doctor or pharmacist before you use any other medicines. This includes over-the-counter medicines.  "Make sure your doctor knows all of the medicines, vitamins, herbs, and supplements you take. Taking some medicines at the same time can cause problems.  Where can you learn more?  Go to https://www.[employer].net/patiented  Enter V615 in the search box to learn more about \"Learning About Rh Immunoglobulin Shots.\"  Current as of: July 10, 2023               Content Version: 14.0    8959-7991 [employer].   Care instructions adapted under license by your healthcare professional. If you have questions about a medical condition or this instruction, always ask your healthcare professional. [employer] disclaims any warranty or liability for your use of this information.      Rubella ([place] Measles): Care Instructions  Overview  Rubella, also called [place] measles or 3-day measles, is a disease caused by a virus. It spreads by coughs, sneezes, and close contact. Rubella usually is mild and does not cause long-term problems. But if you are pregnant and get it, you can give the disease to your unborn baby. This can cause serious birth defects.  While you have rubella, you may get a rash and a mild fever, and the lymph glands in your neck may swell. Older children often have a fever, eye pain, a sore throat, and body aches. You can relieve most symptoms with care at home. Avoid being around others, especially pregnant people, until your rash has been gone for at least 4 days. People who have not had this disease before or have not had the vaccine have the greatest chance of getting the virus.  Follow-up care is a key part of your treatment and safety. Be sure to make and go to all appointments, and call your doctor if you are having problems. It's also a good idea to know your test results and keep a list of the medicines you take.  How can you care for yourself at home?  Drink plenty of fluids. If you have kidney, heart, or liver disease and have to limit fluids, talk with your doctor before you " "increase the amount of fluids you drink.  Get plenty of rest to help your body heal.  Take an over-the-counter pain medicine, such as acetaminophen (Tylenol), ibuprofen (Advil, Motrin), or naproxen (Aleve), to reduce fever and discomfort. Read and follow all instructions on the label. Do not give aspirin to anyone younger than 20. It has been linked to Reye syndrome, a serious illness.  Do not take two or more pain medicines at the same time unless the doctor told you to. Many pain medicines have acetaminophen, which is Tylenol. Too much acetaminophen (Tylenol) can be harmful.  Try not to scratch the rash. Put cold, wet cloths on the rash to reduce itching.  Do not smoke. Smoking can make your symptoms worse. If you need help quitting, talk to your doctor about stop-smoking programs and medicines. These can increase your chances of quitting for good.  Avoid contact with people who have never had rubella and who have not been immunized.  When should you call for help?   Call your doctor now or seek immediate medical care if:    You have a fever with a stiff neck or a severe headache.     You are sensitive to light or feel very sleepy or confused.   Watch closely for changes in your health, and be sure to contact your doctor if:    You do not get better as expected.   Where can you learn more?  Go to https://www.[employer]ch.net/patiented  Enter B812 in the search box to learn more about \"Rubella ([place] Measles): Care Instructions.\"  Current as of: June 12, 2023               Content Version: 14.0    0728-3600 [employer].   Care instructions adapted under license by your healthcare professional. If you have questions about a medical condition or this instruction, always ask your healthcare professional. [employer] disclaims any warranty or liability for your use of this information.      Gonorrhea and Chlamydia: About These Tests  What is it?  These tests use a sample of urine or other body " fluid to look for the bacteria that cause these sexually transmitted infections (STIs). The fluid sample can come from the cervix, vagina, rectum, throat, or eyes.  Why is this test done?  These tests may be done to:  Find out if symptoms are caused by gonorrhea or chlamydia.  Check people who are at high risk of being infected with gonorrhea or chlamydia.  Retest people several months after they have been treated for gonorrhea or chlamydia.  Check for infection in your  if you had a gonorrhea or chlamydia infection at the time of delivery.  How can you prepare for the test?  If you are going to have a urine test, do not urinate for at least 1 hour before the test.  If you think you may have chlamydia or gonorrhea, don't have sexual intercourse until you get your test results. And you may want to have tests for other STIs, such as HIV.  How is the test done?  For a direct sample, a swab is used to collect body fluid from the cervix, vagina, rectum, throat, or eyes. Your doctor may collect the sample. Or you may be given instructions on how to collect your own sample.  For a urine sample, you will collect the urine that comes out when you first start to urinate. Don't wipe the genital area clean before you urinate.  How long does the test take?  The test will take a few minutes.  What happens after the test?  You will be able to go home right away.  You can go back to your usual activities right away.  If you do have an infection, don't have sexual intercourse for 7 days after you start treatment. And your sex partner(s) should also be treated.  Follow-up care is a key part of your treatment and safety. Be sure to make and go to all appointments, and call your doctor if you are having problems. It's also a good idea to keep a list of the medicines you take. Ask your doctor when you can expect to have your test results.  Where can you learn more?  Go to https://www.healthwise.net/patiented  Enter K976 in the  "search box to learn more about \"Gonorrhea and Chlamydia: About These Tests.\"  Current as of: November 27, 2023  Content Version: 14.1 2006-2024 [employer].   Care instructions adapted under license by your healthcare professional. If you have questions about a medical condition or this instruction, always ask your healthcare professional. [employer] disclaims any warranty or liability for your use of this information.    Trichomoniasis: About This Test  What is it?     This test uses a sample of urine or other body fluid to look for the tiny parasite that causes trichomoniasis (also called trich). The fluid sample can come from the vagina, cervix, or urethra. Your doctor may choose to use one or more of many available tests.  Why is it done?  A trich test may be done to:  Find out if symptoms are caused by trich.  Check people who are at high risk for being infected with trich.  Check after treatment to make sure that the infection is gone.  How do you prepare for the test?  If you are going to have a urine test, do not urinate for at least 1 hour before the test.  How is the test done?  For a direct sample, a swab is used to collect body fluid from the cervix, vagina, or urethra. Your doctor may collect the sample. Or you may be given instructions on how to collect your own sample.  For a urine sample, you will collect the urine that comes out when you first start to urinate. Don't wipe the area clean before you urinate.  How long does the test take?  It will take a few minutes to collect a sample.  What happens after the test?  You can go home right away.  You can go back to your usual activities right away.  You may get the test results the same day or several days later. It depends on the test used.  If you do have an infection, don't have sexual intercourse for 7 days after you start treatment. Your sex partner or partners should also be treated.  Follow-up care is a key part of " your treatment and safety. Be sure to make and go to all appointments, and call your doctor if you are having problems. Ask your doctor when you can expect to have your test results.  Current as of: November 27, 2023  Content Version: 14.1    8771-9088 [employer].   Care instructions adapted under license by your healthcare professional. If you have questions about a medical condition or this instruction, always ask your healthcare professional. [employer] disclaims any warranty or liability for your use of this information.    HIV Testing: Care Instructions  Overview  You can get tested for the human immunodeficiency virus (HIV). Most doctors use a blood test to check for HIV antibodies and antigens in your blood. It may also check for the genetic material (RNA) of HIV. Some tests use saliva to check for HIV antibodies. But these aren't as accurate. For example, they may give a false result if you've just been infected.  What do the results mean?    Normal (negative)    No HIV antibodies, antigens, or RNA were found.  You may need more testing. It can make sure your test results are correct.    Uncertain (indeterminate)    Test results didn't clearly show if you have an HIV infection.  HIV antibodies or antigens may not have formed yet.  Some other type of antibody or antigen may have affected the results.  You will need another test to be sure.    Abnormal (positive)    HIV antibodies, antigens, or RNA were found.  If you haven't had an RNA test yet, one will be done. If it's positive, you have HIV.  If your test result is positive, your doctor will talk to you. You will discuss starting treatment.  Follow-up care is a key part of your treatment and safety. Be sure to make and go to all appointments, and call your doctor if you are having problems. It's also a good idea to know your test results and keep a list of the medicines you take.  Where can you learn more?  Go to  "[email or web address].Brightfish.net/patiented  Enter T792 in the search box to learn more about \"HIV Testing: Care Instructions.\"  Current as of: June 12, 2023               Content Version: 14.0    0870-2680 [employer].   Care instructions adapted under license by your healthcare professional. If you have questions about a medical condition or this instruction, always ask your healthcare professional. [employer] disclaims any warranty or liability for your use of this information.      Hepatitis C Virus Tests: About These Tests  What are they?     Hepatitis C virus tests are blood tests that check for substances in the blood that show whether you have hepatitis C now or had it in the past. The tests can also tell you what type of hepatitis C you have and how severe the disease is. This can help your doctor with treatment.  If the tests show that you have long-term hepatitis C, you need to take steps to prevent spreading the disease.  Why are these tests done?  You may need these tests if:  You have symptoms of hepatitis.  You may have been exposed to the virus. You are more likely to have been exposed to the virus if you inject drugs or are exposed to body fluids (such as if you are a health care worker).  You've had other tests that show you have liver problems.  You are 18 to 79 years old.  You have an HIV infection.  The tests also are done to help your doctor decide about your treatment and see how well it works.  How do you prepare for the test?  In general, there's nothing you have to do before this test, unless your doctor tells you to.  How is the test done?  A health professional uses a needle to take a blood sample, usually from the arm.  What happens after these tests?  You will probably be able to go home right away.  You can go back to your usual activities right away.  Follow-up care is a key part of your treatment and safety. Be sure to make and go to all appointments, and call " "your doctor if you are having problems. It's also a good idea to keep a list of the medicines you take. Ask your doctor when you can expect to have your test results.  Where can you learn more?  Go to https://www.DrNaturalHealing.net/patiented  Enter W551 in the search box to learn more about \"Hepatitis C Virus Tests: About These Tests.\"  Current as of: June 12, 2023               Content Version: 14.0    8483-4994 [employer].   Care instructions adapted under license by your healthcare professional. If you have questions about a medical condition or this instruction, always ask your healthcare professional. [employer] disclaims any warranty or liability for your use of this information.      Learning About Fetal Ultrasound Results  What is a fetal ultrasound?     Fetal ultrasound is a test that lets your doctor see an image of your baby. Your doctor learns information about your baby from this picture. You may find out, for example, if you are having a boy or a girl. But the main reason you have this test is to get information about your baby's health.  (You may hear your baby called a fetus. This is a common medical term for a baby that's growing in the mother's uterus.)  What kind of information can you learn from this test?  The findings of an ultrasound fall into two categories, normal and abnormal.  Normal  The fetus is the right size for its age.  The placenta is the expected size and does not cover the cervix.  There is enough amniotic fluid in the uterus.  No birth defects can be seen.  Abnormal  The fetus is small or large for its age.  The placenta covers the cervix.  There is too much or too little amniotic fluid in the uterus.  The fetus may have a birth defect.  What does an abnormal result mean?  Abnormal seems to imply that something is wrong with your baby. But what it means is that the test has shown something the doctor wants to take a closer look at.  And that's what happens " next. Your doctor will talk to you about what further test or tests you may need.  What do the results mean?  Some of the things your doctor may see on an abnormal ultrasound include:  Echogenic bowel.  The bowel looks very bright on the screen. This could mean that there's blood in the bowel. Or it could mean that something is blocking the small bowel.  Increased nuchal translucency.  The ultrasound measures the thickness at the back of the baby's neck. An increase in thickness is sometimes an early sign of Down syndrome.  Increased or decreased amniotic fluid.  The doctor will look for a reason for the level of amniotic fluid and will watch the pregnancy closely as it progresses.  Large ventricles.  Ventricles in the brain look larger than they should. Your doctor may take a closer look at the brain.  Renal pyelectasis/hydronephrosis.  The ultrasound measures the fluid around the kidney. If there is more fluid than expected, there is a chance of urinary tract or kidney problems.  Short long bones.  The ultrasound measures certain arm and leg bones. A long bone (humerus or femur) that is shorter than average could be a sign of Down syndrome.  Subchorionic hemorrhage.  An ultrasound can show bleeding under one of the membranes that surrounds the fetus. Some women don't have symptoms of bleeding. The ultrasound can find this problem when women are not bleeding from their vagina. Women who have this condition have a slightly higher chance of miscarriage.  What do you do now?  Take a deep breath, and let it out. Keep in mind that an abnormal finding on an ultrasound, after it's coupled with more information, may:  Turn out to be nothing.  Turn out to be something mild that won't affect the baby.  Turn out to be something more serious. But if this happens, early diagnosis helps you and your doctor plan treatment options sooner rather than later.  Your medical team is there for you. So are your family and friends. Ask  "questions, and get the help and support you need.  Follow-up care is a key part of your treatment and safety. Be sure to make and go to all appointments, and call your doctor if you are having problems. It's also a good idea to know your test results and keep a list of the medicines you take.  Where can you learn more?  Go to https://www.What the Trend.net/patiented  Enter K451 in the search box to learn more about \"Learning About Fetal Ultrasound Results.\"  Current as of: July 10, 2023  Content Version: 14.1    5413-9451 [employer].   Care instructions adapted under license by your healthcare professional. If you have questions about a medical condition or this instruction, always ask your healthcare professional. [employer] disclaims any warranty or liability for your use of this information.    Learning About Prenatal Visits  Overview     Regular prenatal visits are very important during any pregnancy. These quick office visits may seem simple and routine. But they can help you have a safe and healthy pregnancy. Your doctor is watching for problems that can only be found through regular checkups. The visits also give you and your doctor time to build a good relationship.  After your first visit, you will most likely start on a schedule of monthly visits. In your third trimester, the visits will get more frequent. Based on your health, your age, and if you've had a normal, full-term pregnancy before, your doctor may want to see you more or less often.  At different times in your pregnancy, you will have exams and tests. Some are routine. Others are done only when there is a chance of a problem. Everything healthy you do for your body helps you have a healthy pregnancy. Rest when you need it. Eat well, drink plenty of water, and exercise regularly.  What happens during a prenatal visit?  You will have blood pressure checks, along with urine tests. You also may have blood tests. If you need " to go to the bathroom while waiting for the doctor, tell the nurse. You will be given a sample cup so your urine can be tested.  You will be weighed and have your belly measured.  Your doctor may listen to the fetal heartbeat with a special device.  At about 24 weeks, and possibly earlier in your pregnancy, your doctor will check your blood sugar (glucose tolerance test) for diabetes that can occur during pregnancy. This is gestational diabetes, which can be harmful.  You will have tests to check for infections that could harm your . These include group B streptococcus and hepatitis B.  Your doctor may do ultrasounds to check for problems. This also checks the position of the fetus. An ultrasound uses sound waves to produce a picture of the fetus.  You may get your vaccines updated.  Your doctor may ask you questions to check for signs of anxiety or depression. Tell your doctor if you feel sad, anxious, or hopeless for more than a few days.  You may have other tests at any time during your pregnancy.  Use your visits to discuss with your doctor any concerns you have.  How can you care for yourself at home?  Get plenty of rest.  Try to exercise every day, if your doctor says it is okay. If you have not exercised in the past, start out slowly. For example, you can take short walks each day.  Choose healthy foods, such as fruits, vegetables, whole grains, lean proteins, low-fat dairy, and healthy fats.  Drink plenty of fluids. Cut down on drinks with caffeine, such as coffee, tea, and cola. If you have kidney, heart, or liver disease and have to limit fluids, talk with your doctor before you increase the amount of fluids you drink.  Try to avoid chemical fumes, paint fumes, and poisons.  If you smoke, vape, or use alcohol, marijuana, or other drugs, quit or cut back as much as you can. Talk to your doctor if you need help quitting.  Review all of your medicines, including over-the-counter medicines and  "supplements, with your doctor. Some of your routine medicines may need to be changed. Do not stop or start taking any medicines without talking to your doctor first.  Follow-up care is a key part of your treatment and safety. Be sure to make and go to all appointments, and call your doctor if you are having problems. It's also a good idea to know your test results and keep a list of the medicines you take.  Where can you learn more?  Go to https://www.[employer].net/patiented  Enter J502 in the search box to learn more about \"Learning About Prenatal Visits.\"  Current as of: July 10, 2023               Content Version: 14.0    9638-0118 [employer].   Care instructions adapted under license by your healthcare professional. If you have questions about a medical condition or this instruction, always ask your healthcare professional. [employer] disclaims any warranty or liability for your use of this information.      Intimate Partner Violence: Care Instructions  Overview     If you want to save this information but don't think it is safe to take it home, see if a trusted friend can keep it for you. Plan ahead. Know who you can call for help, and memorize the phone number.   Be careful online too. Your online activity may be seen by others. Do not use your personal computer or device to read about this topic. Use a safe computer, such as one at work, a friend's home, or a library.    Intimate partner violence--a type of domestic abuse--is different from an argument now and then. It is a pattern of abuse that one person may use to control another person's behavior. It may start with threats and name-calling. Then, it may lead to more serious acts, like pushing and slapping. The abuse also may occur in other areas. For example, the abuser may withhold money or spend a partner's money without their knowledge.  Abuse can cause serious harm. You are more likely to have a long-term health problem " from the injuries and stress of living in a violent relationship. People who are sexually abused by their partners have more sexually transmitted infections and unplanned pregnancies. Anyone who is abused also faces emotional pain. Anyone can be abused in relationships. In some relationships, both people use abusive behavior.  If you are pregnant, abuse can cause problems such as poor weight gain, infections, and bleeding. Abuse during this time may increase your baby's risk of low birth weight, premature birth, and death.  Follow-up care is a key part of your treatment and safety. Be sure to make and go to all appointments, and call your doctor if you are having problems. It's also a good idea to know your test results and keep a list of the medicines you take.  How can you care for yourself at home?  If you do not have a safe place to stay, discuss this with your doctor before you leave.  Have a plan for where to go, how to leave your home, and where to stay in case of an emergency. Do not tell your partner about your plan. Contact:  The National Domestic Violence Hotline toll-free at 1-435.962.3927. They can help you find resources in your area.  Your local police department, hospital, or clinic for information about shelters and safe homes near you.  Talk to a trusted friend or neighbor, a counselor, or a benigno leader. Do not feel that you have to hide what happened.  Teach your children how to call for help in an emergency.  Be alert to warning signs, such as threats, heavy alcohol use, or drug use. This can help you avoid danger.  If you can, make sure that there are no guns or other weapons in your home.  When should you call for help?   Call 911 anytime you think you may need emergency care. For example, call if:    You or someone else has just been abused.     You think you or someone else is in danger of being abused.   Watch closely for changes in your health, and be sure to contact your doctor if you  "have any problems.  Where can you learn more?  Go to https://www.M[employer]o.net/patiented  Enter G282 in the search box to learn more about \"Intimate Partner Violence: Care Instructions.\"  Current as of: June 24, 2023               Content Version: 14.0    7608-6562 [employer].   Care instructions adapted under license by your healthcare professional. If you have questions about a medical condition or this instruction, always ask your healthcare professional. [employer] disclaims any warranty or liability for your use of this information.      Intimate Partner Violence Safety Instructions: Care Instructions  Overview     If you want to save this information but don't think it is safe to take it home, see if a trusted friend can keep it for you. Plan ahead. Know who you can call for help, and memorize the phone number.   Be careful online too. Your online activity may be seen by others. Do not use your personal computer or device to read about this topic. Use a safe computer, such as one at work, a friend's home, or a library.    When you are abused by a spouse or partner, you can take actions to protect yourself and your children.  You can increase your safety whether you decide to stay with your spouse or partner or you decide to leave. You may want to make a safety plan and pack a bag ahead of time. This will help you leave quickly when you decide to. Remember, you cannot change your partner's actions, but you can find help for you and your children. No one deserves to be abused.  Follow-up care is a key part of your treatment and safety. Be sure to make and go to all appointments, and call your doctor if you are having problems. It's also a good idea to know your test results and keep a list of the medicines you take.  How can you care for yourself at home?  Make a plan for your safety   If you decide to stay with your abusive spouse or partner, you can do the following to increase " your safety:  Decide what works best to keep you safe in an emergency.  Know who you can call to help you in an emergency.  Decide if you will call the police if you get hurt again. If you can, agree on a signal with your children or neighbor to call the police for you if you need help. You can flash lights or hang something out of a window.  Choose a safe place to go for a short time if you need to leave home. Memorize the address and phone number.  Learn escape routes out of your home in case you need to leave in a hurry. Teach your children different ways to get out of your home quickly if they need to.  If you can, hide or lock up things that can be used as weapons (guns, knives, hammers).  Learn the number of a domestic violence shelter. Talk to the people there about how they can help.  Find out about other community resources that can help you.  Take pictures of bruises or other injuries if you can. You can also take pictures of things your abuser has broken.  Teach your children that violence is never okay. Tell them that they do not deserve to be hurt.  Pack a bag   Prepare a bag with things you will need if you leave suddenly. Leave it with a friend, a relative, or someone else you trust. You could include the following items in the bag:  A set of keys to your home and car.  Emergency phone numbers and addresses.  Money such as cash or checks. You can also ask a friend, a relative, or someone else you trust to hold money for you.  Copies of legal documents such as house and car titles or rent receipts, birth certificates, Social Security card, voter registration, marriage and 's licenses, and your children's health records.  Personal items you would need for a few days, such as clothes, a toothbrush, toothpaste, and any medicines you or your children need.  A favorite toy or book for your child or children.  Diapers and bottles, if you have very young children.  Pictures that show signs of abuse and  "violence. You may also add pictures of your abuser.  If you leave   If you decide to leave, you can take the following steps:  Go to the emergency room at a hospital if you have been hurt.  Think about asking the police to be with you as you leave. They can protect you as you leave your home.  If you decide to leave secretly, remember that activities can be tracked. Your abuser may still have access to your cell phone, email, and credit cards. It may be possible for these to be traced. Always be aware of your surroundings.  If this is an emergency, do not worry about gathering up anything. Just leave--your safety is most important.  If your abuser moves out, change the locks on the doors. If you have a security system, change the access code.  When should you call for help?   Call 911 anytime you think you may need emergency care. For example, call if:    You or someone else has just been abused.     You think you or someone else is in danger of being abused.   Watch closely for changes in your health, and be sure to contact your doctor if you have any problems.  Where can you learn more?  Go to https://www.Aff[employer]ergy.net/patiented  Enter A752 in the search box to learn more about \"Intimate Partner Violence Safety Instructions: Care Instructions.\"  Current as of: June 24, 2023               Content Version: 14.0    5154-3152 [employer].   Care instructions adapted under license by your healthcare professional. If you have questions about a medical condition or this instruction, always ask your healthcare professional. [employer] disclaims any warranty or liability for your use of this information.      Learning About Intimate Partner Violence  What is intimate partner violence?  Intimate partner violence is a type of domestic abuse. It's threatening, emotionally harmful, or violent behavior in a personal relationship. It can happen between past or current partners or spouses. In some " relationships both people abuse each other. One partner may be more abusive. Or the abuse may be equal.  Abuse can affect people of any ethnic group, race, or [employer]ar[place]. It can affect teens, adults, or the elderly. And it can happen to people of any sexual orientation, gender, or social status.  Abusers use fear, bullying, and threats to control their partners. They may control what their partners do. They may control where their partners go or who they see. They may act jealous, controlling, or possessive. These early signs of abuse may happen soon after the start of the relationship. Sometimes it can be hard to notice abuse at first. But after the relationship becomes more serious, the abuse may get worse.  If you are being abused in your relationship, it's important to get help. The abuse is not your fault. You don't have to face it alone.  Be careful  It may not be safe to take home domestic abuse information like this handout. Some people ask a trusted friend to keep it for them. It's also important to plan ahead and to memorize the phone number of places you can go for help. If you are concerned about your safety, do not use your computer, smartphone, or tablet to read about domestic abuse.   What are the types of intimate partner violence?  Abuse can happen in different ways. Each type can happen on its own or in combination with others.  Emotional abuse  Emotional abuse is a pattern of threats, insults, or controlling behavior. It includes verbal abuse. It goes beyond healthy disagreements in a relationship. It's a sign of an unhealthy relationship.  Do you feel threatened, intimidated, or controlled?  Does your partner:  Threaten your children, other family members, or pets?  Use jokes meant to embarrass or shame you?  Call you names?  Tell you that you are a bad parent?  Threaten to take away your children?  Threaten to have you or your family members deported?  Control your access to money or other basic  needs?  Control what you do, who you see or talk to, or where you go?  Another form of emotional abuse is denying that it is happening. Or the abuser may act like the abuse is no big deal or is your fault.  Sexual abuse  With sexual abuse, abusers may try to convince or force you to have sex. They may force you into sex acts you're not comfortable with. Or they may sexually assault you. Sexual abuse can happen even if you are in a committed relationship.  Physical abuse  Physical abuse means that a partner hits, kicks, or does something else to physically hurt you. Physical abuse that starts with a slap might lead to kicking, shoving, and choking over time. The abuser may also threaten to hurt or kill you.  Stalking  Stalking means that an abuser gives you attention that you do not want and that causes you fear. Examples of stalking include:  Following you.  Showing up at places where the abuser isn't invited, such as at your work or school.  Constantly calling or texting you.  What problems can  to?  Intimate partner violence can be very dangerous. It can cause serious, repeated injury. It can even lead to death.  All forms of abuse can cause long-term health problems from the stress of a violent relationship. Verbal abuse can lead to sexual and physical abuse.  Abuse causes:  Emotional pain.  Depression.  Anxiety.  Post-traumatic stress.  Sexual abuse can lead to sexually transmitted infections (such as HIV/AIDS) and unplanned pregnancy.  Pregnancy can be a very dangerous time for people in abusive relationships. Abuse can cause or increase the risk of problems during pregnancy. These include low weight gain, anemia, infections, and bleeding. Abuse may also increase your baby's risk of low birth weight, premature birth, and death.  It can be hard for some victims of abuse to ask for help or to leave their relationship. You may feel scared, stuck, or not sure what steps to take. But it's important not to  "ignore abuse. Talking to someone you trust could be the first step to ending the abuse and taking care of your own health and happiness again. There are resources available that can help keep you safe.  Where can you get help?  Talk to a trusted friend. Find a local advocacy group, or talk to your doctor about the abuse.  Contact the National Domestic Violence Hotline at 8-833-550-SAFE (1-479.334.4383) for more safety tips. They can guide you to groups in your area that can help. Or go to the National Coalition Against Domestic Violence website at www.thehotlJobs The Word.org to learn more.  Domestic violence groups or a counselor in your area can help you make a safety plan for yourself and your children.  When to call for help  Call 911 anytime you think you may need emergency care. For example, call if:  You think that you or someone you know is in danger of being abused.  You have been hurt and can't have someone safely take you to emergency care.  You have just been abused.  A family member has just been abused.  Where can you learn more?  Go to https://www.Setup.net/patiented  Enter S665 in the search box to learn more about \"Learning About Intimate Partner Violence.\"  Current as of: June 24, 2023  Content Version: 14.1 2006-2024 [employer].   Care instructions adapted under license by your healthcare professional. If you have questions about a medical condition or this instruction, always ask your healthcare professional. [employer] disclaims any warranty or liability for your use of this information.    Vaginal Bleeding During Pregnancy: Care Instructions  Overview     It's common to have some vaginal spotting when you are pregnant. In some cases, the bleeding isn't serious. And there aren't any more problems with the pregnancy.  But sometimes bleeding is a sign of a more serious problem. This is more common if the bleeding is heavy or painful. Examples of more serious problems " include miscarriage, an ectopic pregnancy, and a problem with the placenta.  You may have to see your doctor again to be sure everything is okay. You may also need more tests to find the cause of the bleeding.  Home treatment may be all you need. But it depends on what is causing the bleeding. Be sure to tell your doctor if you have any new symptoms or if your symptoms get worse.  The doctor has checked you carefully, but problems can develop later. If you notice any problems or new symptoms, get medical treatment right away.  Follow-up care is a key part of your treatment and safety. Be sure to make and go to all appointments, and call your doctor if you are having problems. It's also a good idea to know your test results and keep a list of the medicines you take.  How can you care for yourself at home?  If your doctor prescribed medicines, take them exactly as directed. Call your doctor if you think you are having a problem with your medicine.  Do not have vaginal sex until your doctor says it's okay.  Do not put anything in your vagina until your doctor says it's okay.  Ask your doctor about other activities you can or can't do.  Get a lot of rest. Being pregnant can make you tired.  Do not use nonsteroidal anti-inflammatory drugs (NSAIDs), such as ibuprofen (Advil, Motrin), naproxen (Aleve), or aspirin, unless your doctor says it is okay.  When should you call for help?   Call 911 anytime you think you may need emergency care. For example, call if:    You passed out (lost consciousness).     You have severe vaginal bleeding. This means you are soaking through a pad each hour for 2 or more hours.     You have sudden, severe pain in your belly or pelvis.   Call your doctor now or seek immediate medical care if:    You have new or worse vaginal bleeding.     You are dizzy or lightheaded, or you feel like you may faint.     You have pain in your belly, pelvis, or lower back.     You think that you are in labor.      "You have a sudden release of fluid from your vagina.     You've been having regular contractions for an hour. This means that you've had at least 8 contractions within 1 hour or at least 4 contractions within 20 minutes, even after you change your position and drink fluids.     You notice that your baby has stopped moving or is moving much less than normal.   Watch closely for changes in your health, and be sure to contact your doctor if you have any problems.  Where can you learn more?  Go to https://www.Tim[employer].net/patiented  Enter N829 in the search box to learn more about \"Vaginal Bleeding During Pregnancy: Care Instructions.\"  Current as of: July 10, 2023               Content Version: 14.0    5425-1223 [employer]   Care instructions adapted under license by your healthcare professional. If you have questions about a medical condition or this instruction, always ask your healthcare professional. [employer] disclaims any warranty or liability for your use of this information.      "

## 2024-09-20 ENCOUNTER — TELEPHONE (OUTPATIENT)
Dept: OBGYN | Facility: CLINIC | Age: 34
End: 2024-09-20
Payer: COMMERCIAL

## 2024-09-20 DIAGNOSIS — Z34.81 PRENATAL CARE, SUBSEQUENT PREGNANCY IN FIRST TRIMESTER: Primary | ICD-10-CM

## 2024-09-20 NOTE — TELEPHONE ENCOUNTER
Ordered [facility] ultrasound for pt to have completed prior to visit on 9/23/24 with Dr. Harp. Scheduled patient 9/22/24 @3pm for ultrasound.     Called patient and informed her of plan. Patient declined ultrasound on 9/22/24 and decided to cancel appointment with Dr. Harp 9/23/24 in [facility]. Patient will keep appointment 10/2/24 in [place] for 1st ob visit.    Canceled ultrasound appointment 9/22/24 and canceled office visit on 9/23/24 per pt request.  Prerna Rogers, CMA

## 2024-10-01 ASSESSMENT — EDINBURGH POSTNATAL DEPRESSION SCALE (EPDS)
I HAVE BLAMED MYSELF UNNECESSARILY WHEN THINGS WENT WRONG: NOT VERY OFTEN
THINGS HAVE BEEN GETTING ON TOP OF ME: NO, I HAVE BEEN COPING AS WELL AS EVER
I HAVE BEEN SO UNHAPPY THAT I HAVE BEEN CRYING: NO, NEVER
I HAVE LOOKED FORWARD WITH ENJOYMENT TO THINGS: AS MUCH AS I EVER DID
THE THOUGHT OF HARMING MYSELF HAS OCCURRED TO ME: NEVER
I HAVE BEEN ANXIOUS OR WORRIED FOR NO GOOD REASON: YES, SOMETIMES
I HAVE FELT SCARED OR PANICKY FOR NO GOOD REASON: NO, NOT AT ALL
I HAVE FELT SAD OR MISERABLE: NO, NOT AT ALL
I HAVE BEEN ABLE TO LAUGH AND SEE THE FUNNY SIDE OF THINGS: AS MUCH AS I ALWAYS COULD
I HAVE BEEN SO UNHAPPY THAT I HAVE HAD DIFFICULTY SLEEPING: NOT AT ALL
TOTAL SCORE: 3

## 2024-10-02 ENCOUNTER — PRENATAL OFFICE VISIT (OUTPATIENT)
Dept: OBGYN | Facility: CLINIC | Age: 34
End: 2024-10-02
Payer: COMMERCIAL

## 2024-10-02 VITALS
SYSTOLIC BLOOD PRESSURE: 115 MMHG | HEIGHT: 59 IN | BODY MASS INDEX: 40.32 KG/M2 | RESPIRATION RATE: 18 BRPM | TEMPERATURE: 99.2 F | HEART RATE: 68 BPM | DIASTOLIC BLOOD PRESSURE: 68 MMHG | WEIGHT: 200 LBS

## 2024-10-02 DIAGNOSIS — Z23 NEED FOR PROPHYLACTIC VACCINATION AND INOCULATION AGAINST INFLUENZA: ICD-10-CM

## 2024-10-02 DIAGNOSIS — E66.813 CLASS 3 SEVERE OBESITY WITH BODY MASS INDEX (BMI) OF 40.0 TO 44.9 IN ADULT, UNSPECIFIED OBESITY TYPE, UNSPECIFIED WHETHER SERIOUS COMORBIDITY PRESENT (H): ICD-10-CM

## 2024-10-02 DIAGNOSIS — O36.80X0 PREGNANCY OF UNKNOWN ANATOMIC LOCATION: ICD-10-CM

## 2024-10-02 DIAGNOSIS — E66.01 CLASS 3 SEVERE OBESITY WITH BODY MASS INDEX (BMI) OF 40.0 TO 44.9 IN ADULT, UNSPECIFIED OBESITY TYPE, UNSPECIFIED WHETHER SERIOUS COMORBIDITY PRESENT (H): ICD-10-CM

## 2024-10-02 DIAGNOSIS — Z34.81 PRENATAL CARE, SUBSEQUENT PREGNANCY IN FIRST TRIMESTER: Primary | ICD-10-CM

## 2024-10-02 LAB
ALBUMIN UR-MCNC: NEGATIVE MG/DL
APPEARANCE UR: CLEAR
BILIRUB UR QL STRIP: NEGATIVE
COLOR UR AUTO: YELLOW
EST. AVERAGE GLUCOSE BLD GHB EST-MCNC: 111 MG/DL
GLUCOSE UR STRIP-MCNC: NEGATIVE MG/DL
HBA1C MFR BLD: 5.5 % (ref 0–5.6)
HCG INTACT+B SERPL-ACNC: ABNORMAL MIU/ML
HGB UR QL STRIP: ABNORMAL
KETONES UR STRIP-MCNC: NEGATIVE MG/DL
LEUKOCYTE ESTERASE UR QL STRIP: NEGATIVE
NITRATE UR QL: NEGATIVE
PH UR STRIP: 6 [PH] (ref 5–7)
RBC #/AREA URNS AUTO: NORMAL /HPF
SP GR UR STRIP: 1.02 (ref 1–1.03)
UROBILINOGEN UR STRIP-ACNC: 0.2 E.U./DL
WBC #/AREA URNS AUTO: NORMAL /HPF

## 2024-10-02 PROCEDURE — 36415 COLL VENOUS BLD VENIPUNCTURE: CPT | Performed by: STUDENT IN AN ORGANIZED HEALTH CARE EDUCATION/TRAINING PROGRAM

## 2024-10-02 PROCEDURE — 99213 OFFICE O/P EST LOW 20 MIN: CPT | Mod: 25 | Performed by: STUDENT IN AN ORGANIZED HEALTH CARE EDUCATION/TRAINING PROGRAM

## 2024-10-02 PROCEDURE — 90656 IIV3 VACC NO PRSV 0.5 ML IM: CPT | Performed by: STUDENT IN AN ORGANIZED HEALTH CARE EDUCATION/TRAINING PROGRAM

## 2024-10-02 PROCEDURE — 84702 CHORIONIC GONADOTROPIN TEST: CPT | Performed by: STUDENT IN AN ORGANIZED HEALTH CARE EDUCATION/TRAINING PROGRAM

## 2024-10-02 PROCEDURE — 87491 CHLMYD TRACH DNA AMP PROBE: CPT | Performed by: STUDENT IN AN ORGANIZED HEALTH CARE EDUCATION/TRAINING PROGRAM

## 2024-10-02 PROCEDURE — 76815 OB US LIMITED FETUS(S): CPT | Performed by: STUDENT IN AN ORGANIZED HEALTH CARE EDUCATION/TRAINING PROGRAM

## 2024-10-02 PROCEDURE — 83036 HEMOGLOBIN GLYCOSYLATED A1C: CPT | Performed by: STUDENT IN AN ORGANIZED HEALTH CARE EDUCATION/TRAINING PROGRAM

## 2024-10-02 PROCEDURE — 87591 N.GONORRHOEAE DNA AMP PROB: CPT | Performed by: STUDENT IN AN ORGANIZED HEALTH CARE EDUCATION/TRAINING PROGRAM

## 2024-10-02 PROCEDURE — 87086 URINE CULTURE/COLONY COUNT: CPT | Performed by: STUDENT IN AN ORGANIZED HEALTH CARE EDUCATION/TRAINING PROGRAM

## 2024-10-02 PROCEDURE — 81001 URINALYSIS AUTO W/SCOPE: CPT | Performed by: STUDENT IN AN ORGANIZED HEALTH CARE EDUCATION/TRAINING PROGRAM

## 2024-10-02 PROCEDURE — 90471 IMMUNIZATION ADMIN: CPT | Performed by: STUDENT IN AN ORGANIZED HEALTH CARE EDUCATION/TRAINING PROGRAM

## 2024-10-02 ASSESSMENT — EDINBURGH POSTNATAL DEPRESSION SCALE (EPDS)
I HAVE BEEN ANXIOUS OR WORRIED FOR NO GOOD REASON: HARDLY EVER
I HAVE FELT SAD OR MISERABLE: NO, NOT AT ALL
I HAVE FELT SCARED OR PANICKY FOR NO GOOD REASON: NO, NOT AT ALL
I HAVE LOOKED FORWARD WITH ENJOYMENT TO THINGS: AS MUCH AS I EVER DID
THINGS HAVE BEEN GETTING ON TOP OF ME: NO, I HAVE BEEN COPING AS WELL AS EVER
I HAVE BEEN SO UNHAPPY THAT I HAVE HAD DIFFICULTY SLEEPING: NOT AT ALL
I HAVE BEEN SO UNHAPPY THAT I HAVE BEEN CRYING: NO, NEVER
TOTAL SCORE: 2
I HAVE BLAMED MYSELF UNNECESSARILY WHEN THINGS WENT WRONG: NOT VERY OFTEN
THE THOUGHT OF HARMING MYSELF HAS OCCURRED TO ME: NEVER
I HAVE BEEN ABLE TO LAUGH AND SEE THE FUNNY SIDE OF THINGS: AS MUCH AS I ALWAYS COULD

## 2024-10-02 NOTE — PROGRESS NOTES
[facility]  New OB Visit Note         Assessment and Plan:      Franchesca Bowen, 33 year old  at 8w0d by LMP, presents for her initial OB visit.     Pregnancy of unknown location  H/o recurrent pregnancy loss  -On bedside transvaginal ultrasound, I saw possible gestational sac in the uterus without yolk sac. Recommended trending HCG (now and in 48 hours).  -Plan to repeat official TVUS in 14 days and then follow up visit (to discuss miscarriage management/recurrent pregnancy loss or first OB visit)  -Return precautions given.    Sabine Regalado MD  Obstetrics and Gynecology  [facility]   10/02/2024          Subjective:   This is a planned pregnancy and her and her partner are excited.  Had 2 spontaneous miscarriages in this past year and feeling nervous. Denied any vaginal bleeding. Endorsed nausea. Has zofran prescription.    ROS: A 10 pt ROS was completed and found to be negative unless mentioned in the HPI.          OB History:     OB History    Para Term  AB Living   4 1 1 0 2 1   SAB IAB Ectopic Multiple Live Births   2 0 0 0 1      # Outcome Date GA Lbr Bharat/2nd Weight Sex Type Anes PTL Lv   4 Current            3 SAB 2024           2 SAB 2024           1 Term 23 39w2d  4.01 kg (8 lb 13.5 oz) M CS-LTranv  N IVAN      Birth Comments: PNP in attendance at primary . Infant placed on sterile field for delayed cord clamping. Brought to warmer and dried/stimulated by RN. Apgars 9/9. Placed skin to skin with mother in OR.       Complications: Failure to Progress in First Stage      Name: Tomer      Apgar1: 9  Apgar5: 9          Past Medical History:   Denied personal history of asthma or cHTN.  Past Medical History:   Diagnosis Date    Chickenpox      Patient Active Problem List    Diagnosis Date Noted    Prenatal care, subsequent pregnancy 2024     Priority: Medium    S/P primary low transverse  2023      Priority: Medium    Anemia due to blood loss, acute 2023     Priority: Medium    Morbid obesity (H) 2023     Priority: Medium    Class 2 obesity without serious comorbidity in adult 2023     Priority: Medium    Female infertility 2022     Priority: Medium          Past Surgical History:     Past Surgical History:   Procedure Laterality Date     SECTION N/A 3/24/2023    Procedure:  SECTION;  Surgeon: Naomi Moore MD;  Location: [facility] OR    MYRINGOTOMY, INSERT TUBE BILATERAL, COMBINED      MS REMOVE TONSILS/ADENOIDS,<13 Y/O      age 9          Social History:   Denied smoking, alcohol use, or recreational drug use  Social History     Tobacco Use    Smoking status: Former     Current packs/day: 0.00     Average packs/day: 0.5 packs/day for 7.0 years (3.5 ttl pk-yrs)     Types: Cigarettes     Start date: 2010     Quit date: 2017     Years since quittin.3     Passive exposure: Past    Smokeless tobacco: Never   Substance Use Topics    Alcohol use: Not Currently          Family History:     Family History   Problem Relation Age of Onset    No Known Problems Mother     Crohn's Disease Father     No Known Problems Maternal Grandmother     No Known Problems Maternal Grandfather     No Known Problems Paternal Grandmother     No Known Problems Paternal Grandfather     No Known Problems Brother     No Known Problems Son           Immunizations:     Immunization History   Administered Date(s) Administered    COVID-19 MONOVALENT 12+ (Pfizer) 2021, 2022    HIB (PRP-T) 1993    HepB, Unspecified 1998, 1998, 1998    Influenza Vaccine >6 months,quad, PF 10/12/2022    Influenza Vaccine, 6+MO IM (QUADRIVALENT W/PRESERVATIVES) 2021    MMR 1993, 2004    TDAP (Adacel,Boostrix) 2023    Td (Adult), Adsorbed 2004          Allergies:   No Known Allergies       Medications:   (Not in a hospital  "admission)    Current Outpatient Medications   Medication Sig Dispense Refill    famotidine (PEPCID) 20 MG tablet Take 20 mg by mouth 2 times daily.      Prenatal Vit-Fe Fumarate-FA (PRENATAL MULTIVITAMIN  PLUS IRON) 27-1 MG TABS Take 1 tablet by mouth daily      ondansetron (ZOFRAN ODT) 4 MG ODT tab Take 1 tablet (4 mg) by mouth every 8 hours as needed for nausea (Patient not taking: Reported on 7/3/2024) 10 tablet 0    oxyCODONE (ROXICODONE) 5 MG tablet Take 1-2 tablets (5-10 mg) by mouth every 6 hours as needed for severe pain (Patient not taking: Reported on 7/3/2024) 15 tablet 0    oxyCODONE-acetaminophen (PERCOCET) 5-325 MG tablet Take 1 tablet by mouth every 6 hours as needed for severe pain (Patient not taking: Reported on 7/3/2024) 12 tablet 0         Objective:   /68 (BP Location: Right arm, Patient Position: Chair, Cuff Size: Adult Regular)   Pulse 68   Temp 99.2  F (37.3  C) (Tympanic)   Resp 18   Ht 1.499 m (4' 11\")   Wt 90.7 kg (200 lb)   LMP 08/07/2024   BMI 40.40 kg/m      Estimated body mass index is 40.4 kg/m  as calculated from the following:    Height as of this encounter: 1.499 m (4' 11\").    Weight as of this encounter: 90.7 kg (200 lb).    General appearance: well-hydrated, A&O x 3, no apparent distress  Lungs: Equal expansion bilaterally, no accessory muscle use  Heart: No heaves or thrills. No peripheral varicosities  Constitutional: See vitals  Abdomen: Soft, non-tender, non-distended. No rebound, rigidity, or guarding.  Extremities: trace edema  Genitourinary:  External genitalia: no erythema, no lesions.          Labs/Imagings:     Bedside Transvaginal Ultrasound performed on 10/02/2024    Possible gestational sac visualized in the uterus. No yolk sac. Bilateral adnexa not seen.         "

## 2024-10-02 NOTE — NURSING NOTE
"Initial /68 (BP Location: Right arm, Patient Position: Chair, Cuff Size: Adult Regular)   Pulse 68   Temp 99.2  F (37.3  C) (Tympanic)   Resp 18   Ht 1.499 m (4' 11\")   Wt 90.7 kg (200 lb)   LMP 08/07/2024   BMI 40.40 kg/m   Estimated body mass index is 40.4 kg/m  as calculated from the following:    Height as of this encounter: 1.499 m (4' 11\").    Weight as of this encounter: 90.7 kg (200 lb). .    "

## 2024-10-03 LAB
C TRACH DNA SPEC QL PROBE+SIG AMP: NEGATIVE
N GONORRHOEA DNA SPEC QL NAA+PROBE: NEGATIVE

## 2024-10-04 ENCOUNTER — LAB (OUTPATIENT)
Dept: LAB | Facility: CLINIC | Age: 34
End: 2024-10-04
Payer: COMMERCIAL

## 2024-10-04 DIAGNOSIS — O36.80X0 PREGNANCY OF UNKNOWN ANATOMIC LOCATION: ICD-10-CM

## 2024-10-04 LAB
BACTERIA UR CULT: NORMAL
HCG INTACT+B SERPL-ACNC: ABNORMAL MIU/ML

## 2024-10-04 PROCEDURE — 84702 CHORIONIC GONADOTROPIN TEST: CPT

## 2024-10-04 PROCEDURE — 36415 COLL VENOUS BLD VENIPUNCTURE: CPT

## 2024-10-15 LAB
ABO/RH(D): NORMAL
ANTIBODY SCREEN: NEGATIVE
SPECIMEN EXPIRATION DATE: NORMAL

## 2024-10-15 ASSESSMENT — EDINBURGH POSTNATAL DEPRESSION SCALE (EPDS)
I HAVE BEEN ANXIOUS OR WORRIED FOR NO GOOD REASON: NO, NOT AT ALL
I HAVE BLAMED MYSELF UNNECESSARILY WHEN THINGS WENT WRONG: NO, NEVER
THE THOUGHT OF HARMING MYSELF HAS OCCURRED TO ME: NEVER
I HAVE BEEN SO UNHAPPY THAT I HAVE HAD DIFFICULTY SLEEPING: NOT AT ALL
I HAVE BEEN SO UNHAPPY THAT I HAVE BEEN CRYING: NO, NEVER
I HAVE BEEN ABLE TO LAUGH AND SEE THE FUNNY SIDE OF THINGS: NOT AT ALL
I HAVE LOOKED FORWARD WITH ENJOYMENT TO THINGS: AS MUCH AS I EVER DID
TOTAL SCORE: 4
THINGS HAVE BEEN GETTING ON TOP OF ME: NO, I HAVE BEEN COPING AS WELL AS EVER
I HAVE FELT SAD OR MISERABLE: NOT VERY OFTEN
I HAVE FELT SCARED OR PANICKY FOR NO GOOD REASON: NO, NOT AT ALL

## 2024-10-16 ENCOUNTER — TELEPHONE (OUTPATIENT)
Dept: OBGYN | Facility: CLINIC | Age: 34
End: 2024-10-16

## 2024-10-16 ENCOUNTER — PREP FOR PROCEDURE (OUTPATIENT)
Dept: OBGYN | Facility: CLINIC | Age: 34
End: 2024-10-16

## 2024-10-16 ENCOUNTER — HOSPITAL ENCOUNTER (OUTPATIENT)
Dept: ULTRASOUND IMAGING | Facility: CLINIC | Age: 34
Discharge: HOME | End: 2024-10-16
Attending: STUDENT IN AN ORGANIZED HEALTH CARE EDUCATION/TRAINING PROGRAM | Admitting: STUDENT IN AN ORGANIZED HEALTH CARE EDUCATION/TRAINING PROGRAM
Payer: COMMERCIAL

## 2024-10-16 ENCOUNTER — PRENATAL OFFICE VISIT (OUTPATIENT)
Dept: OBGYN | Facility: CLINIC | Age: 34
End: 2024-10-16
Payer: COMMERCIAL

## 2024-10-16 VITALS
SYSTOLIC BLOOD PRESSURE: 122 MMHG | HEIGHT: 59 IN | HEART RATE: 76 BPM | RESPIRATION RATE: 18 BRPM | WEIGHT: 197 LBS | BODY MASS INDEX: 39.72 KG/M2 | DIASTOLIC BLOOD PRESSURE: 83 MMHG | TEMPERATURE: 98.5 F

## 2024-10-16 DIAGNOSIS — Z01.818 PREOP GENERAL PHYSICAL EXAM: Primary | ICD-10-CM

## 2024-10-16 DIAGNOSIS — N96 RECURRENT PREGNANCY LOSS: ICD-10-CM

## 2024-10-16 DIAGNOSIS — O36.80X0 PREGNANCY OF UNKNOWN ANATOMIC LOCATION: ICD-10-CM

## 2024-10-16 DIAGNOSIS — O03.9 MISCARRIAGE: Primary | ICD-10-CM

## 2024-10-16 PROBLEM — Z34.80 PRENATAL CARE, SUBSEQUENT PREGNANCY: Status: RESOLVED | Noted: 2024-06-11 | Resolved: 2024-10-16

## 2024-10-16 PROBLEM — N97.9 FEMALE INFERTILITY: Status: RESOLVED | Noted: 2022-05-11 | Resolved: 2024-10-16

## 2024-10-16 PROBLEM — Z98.891 S/P PRIMARY LOW TRANSVERSE C-SECTION: Status: RESOLVED | Noted: 2023-03-27 | Resolved: 2024-10-16

## 2024-10-16 PROBLEM — E66.01 MORBID OBESITY (H): Status: RESOLVED | Noted: 2023-03-27 | Resolved: 2024-10-16

## 2024-10-16 LAB
ERYTHROCYTE [DISTWIDTH] IN BLOOD BY AUTOMATED COUNT: 12.3 % (ref 10–15)
HCT VFR BLD AUTO: 40.3 % (ref 35–47)
HGB BLD-MCNC: 13.5 G/DL (ref 11.7–15.7)
MCH RBC QN AUTO: 31 PG (ref 26.5–33)
MCHC RBC AUTO-ENTMCNC: 33.5 G/DL (ref 31.5–36.5)
MCV RBC AUTO: 92 FL (ref 78–100)
PLATELET # BLD AUTO: 264 10E3/UL (ref 150–450)
PROLACTIN SERPL 3RD IS-MCNC: 19 NG/ML (ref 5–23)
RBC # BLD AUTO: 4.36 10E6/UL (ref 3.8–5.2)
TSH SERPL DL<=0.005 MIU/L-ACNC: 2.6 UIU/ML (ref 0.3–4.2)
WBC # BLD AUTO: 8.4 10E3/UL (ref 4–11)

## 2024-10-16 PROCEDURE — 84146 ASSAY OF PROLACTIN: CPT | Performed by: STUDENT IN AN ORGANIZED HEALTH CARE EDUCATION/TRAINING PROGRAM

## 2024-10-16 PROCEDURE — 85613 RUSSELL VIPER VENOM DILUTED: CPT | Performed by: STUDENT IN AN ORGANIZED HEALTH CARE EDUCATION/TRAINING PROGRAM

## 2024-10-16 PROCEDURE — 86147 CARDIOLIPIN ANTIBODY EA IG: CPT | Performed by: STUDENT IN AN ORGANIZED HEALTH CARE EDUCATION/TRAINING PROGRAM

## 2024-10-16 PROCEDURE — 85027 COMPLETE CBC AUTOMATED: CPT | Performed by: STUDENT IN AN ORGANIZED HEALTH CARE EDUCATION/TRAINING PROGRAM

## 2024-10-16 PROCEDURE — 84443 ASSAY THYROID STIM HORMONE: CPT | Performed by: STUDENT IN AN ORGANIZED HEALTH CARE EDUCATION/TRAINING PROGRAM

## 2024-10-16 PROCEDURE — 2894A BETA 2 GLYCOPROTEIN 1 ANTIBODY IGG: CPT | Performed by: STUDENT IN AN ORGANIZED HEALTH CARE EDUCATION/TRAINING PROGRAM

## 2024-10-16 PROCEDURE — 99213 OFFICE O/P EST LOW 20 MIN: CPT | Performed by: STUDENT IN AN ORGANIZED HEALTH CARE EDUCATION/TRAINING PROGRAM

## 2024-10-16 PROCEDURE — 85730 THROMBOPLASTIN TIME PARTIAL: CPT | Performed by: STUDENT IN AN ORGANIZED HEALTH CARE EDUCATION/TRAINING PROGRAM

## 2024-10-16 PROCEDURE — 82306 VITAMIN D 25 HYDROXY: CPT | Performed by: STUDENT IN AN ORGANIZED HEALTH CARE EDUCATION/TRAINING PROGRAM

## 2024-10-16 PROCEDURE — 85390 FIBRINOLYSINS SCREEN I&R: CPT | Performed by: PATHOLOGY

## 2024-10-16 PROCEDURE — 36415 COLL VENOUS BLD VENIPUNCTURE: CPT | Performed by: STUDENT IN AN ORGANIZED HEALTH CARE EDUCATION/TRAINING PROGRAM

## 2024-10-16 PROCEDURE — 86900 BLOOD TYPING SEROLOGIC ABO: CPT | Performed by: STUDENT IN AN ORGANIZED HEALTH CARE EDUCATION/TRAINING PROGRAM

## 2024-10-16 PROCEDURE — 86146 BETA-2 GLYCOPROTEIN ANTIBODY: CPT | Performed by: STUDENT IN AN ORGANIZED HEALTH CARE EDUCATION/TRAINING PROGRAM

## 2024-10-16 PROCEDURE — 76801 OB US < 14 WKS SINGLE FETUS: CPT

## 2024-10-16 PROCEDURE — 86850 RBC ANTIBODY SCREEN: CPT | Performed by: STUDENT IN AN ORGANIZED HEALTH CARE EDUCATION/TRAINING PROGRAM

## 2024-10-16 PROCEDURE — 86901 BLOOD TYPING SEROLOGIC RH(D): CPT | Performed by: STUDENT IN AN ORGANIZED HEALTH CARE EDUCATION/TRAINING PROGRAM

## 2024-10-16 RX ORDER — ONDANSETRON 2 MG/ML
4 INJECTION INTRAMUSCULAR; INTRAVENOUS ONCE
Status: CANCELLED | OUTPATIENT
Start: 2024-10-16

## 2024-10-16 RX ORDER — ONDANSETRON 4 MG/1
4 TABLET, ORALLY DISINTEGRATING ORAL ONCE
Status: CANCELLED | OUTPATIENT
Start: 2024-10-16 | End: 2024-10-16

## 2024-10-16 RX ORDER — ACETAMINOPHEN 325 MG/1
975 TABLET ORAL ONCE
Status: CANCELLED | OUTPATIENT
Start: 2024-10-16 | End: 2024-10-16

## 2024-10-16 NOTE — TELEPHONE ENCOUNTER
"[id number]  Franchesca Bowen    You are now scheduled for surgery at [facility].  Below are the details for your surgery.  Please read the \"Preparing for Your Surgery\" instructions and let us know if you have any questions.    Type of surgery: DILATION AND CURETTAGE, UTERUS, USING SUCTION   Surgeon:  Luzmaria Winston MD  Location of surgery: [facility] OR    Date of surgery: 10/18/24    Time: 8:45 am   Arrival Time: 7:15 am    Time can change, to be confirmed a couple of days prior by pre-op surgery nurse.    Pre-Op Appt Date: already done on 10/16/24  Post-Op Appt Date: not needed   Time:     Packet sent out: Yes  Pre-cert/Authorization completed:  TBD by Financial Securing Office.   MA Sterilization/Hysterectomy Acknowledgment Consent signed: Not Applicable    [facility]  [phone]    Fax: [phone]  Same Day Surgery [phone]  Fax: [phone]  [facility] [phone]    "

## 2024-10-16 NOTE — PROGRESS NOTES
[facility]  Gynecology Office Note    Assessment and Plan:   Franchesca Bowen, 33 year old  at 10w0d by LMP, presents for pregnancy of unknown location and h/o recurrent pregnancy loss follow up.    Early pregnancy loss  -Diagnosed with today's ultrasound: CRL measuring at 8mm without fetal heart beat.   -I expressed my condolences and inform her that patient did nothing wrong to cause the miscarriage.  Discussed miscarriage management options: I recommended against expectant management with the CRL measuring at 7w2d (more than 2 weeks behind 10w0d and patient not having any vaginal bleeding); medication management (benefits: Avoid risk associated with surgery; risks: Higher blood loss compared to suction D&C and inability to send surgical pathology for genetic testing); surgical management with suction D&C.  We discussed suction D&C risks: Bleeding, infection, and injury to surrounding organs.  -Patient elected to proceed to suction dilation and curettage.  Preop H&P performed today. T&S, CBC obtained. Plan to send surgical pathology to genetic testing and hospital will help with disposition of surgical pathology.    Recurrent pregnancy loss  -Plan to send product of conception for SNP microarray  Treatment: If abnormalities found refer for genetic counseling. IVF with PGT A (pre implantation genetic testing for aneuploidy). Gamete donation (depending on etiology). Expectant management  -Evaluate for endocrinopathy (accounts for ~10% of RPL): hemoglobin A1C recently returned normal. Will obtainTSH, vitamin D level, prolactin today  Replenish vitamin D  Low sugar diet, weight loss, metformin  Target TSH <2.5. Consider Selenium supplement if TPO antibody is positive  -Evaluation for possible antiphospholipid antibody syndrome.    Lupus Anticoagulant Panel    Beta 2 Glycoprotein 1 Antibody IgG    Beta 2 Glycoprotein 1 Antibody IgM    Cardiolipin Soraya IgG and IgM     Sabine Regalado  MD  Obstetrics and Gynecology  [facility]   10/16/2024     -----------------------------------------------------------------------------------------------------------------------------------  HPI: Franchesca Bowen, 33 year old  at 10w0d by LMP, presents for pregnancy of unknown location and h/o recurrent pregnancy loss follow up.    Denied any vaginal bleeding.     ROS: A 10 pt ROS was completed and found to be otherwise negative unless mentioned in the HPI.     OBHx:   OB History    Para Term  AB Living   4 1 1 0 2 1   SAB IAB Ectopic Multiple Live Births   2 0 0 0 1      # Outcome Date GA Lbr Bharat/2nd Weight Sex Type Anes PTL Lv   4             3 SAB 2024           2 SAB 2024           1 Term 23 39w2d  4.01 kg (8 lb 13.5 oz) M CS-LTranv  N IVAN      Birth Comments: PNP in attendance at primary . Infant placed on sterile field for delayed cord clamping. Brought to warmer and dried/stimulated by RN. Apgars 9/9. Placed skin to skin with mother in OR.       Complications: Failure to Progress in First Stage      Name: Tomer      Apgar1: 9  Apgar5: 9       Past Medical History:   Diagnosis Date    Chickenpox      Patient Active Problem List    Diagnosis Date Noted    Recurrent pregnancy loss 10/16/2024     Priority: Medium    Anemia due to blood loss, acute 2023     Priority: Medium    Class 2 obesity without serious comorbidity in adult 2023     Priority: Medium     Past Surgical History:   Procedure Laterality Date     SECTION N/A 3/24/2023    Procedure:  SECTION;  Surgeon: Naomi Moore MD;  Location: [facility] OR    MYRINGOTOMY, INSERT TUBE BILATERAL, COMBINED      HI REMOVE TONSILS/ADENOIDS,<11 Y/O      age 9     Current Outpatient Medications   Medication Sig Dispense Refill    famotidine (PEPCID) 20 MG tablet Take 20 mg by mouth 2 times daily.      Prenatal Vit-Fe Fumarate-FA (PRENATAL MULTIVITAMIN  PLUS IRON)  "27-1 MG TABS Take 1 tablet by mouth daily      ondansetron (ZOFRAN ODT) 4 MG ODT tab Take 1 tablet (4 mg) by mouth every 8 hours as needed for nausea (Patient not taking: Reported on 7/3/2024) 10 tablet 0    oxyCODONE (ROXICODONE) 5 MG tablet Take 1-2 tablets (5-10 mg) by mouth every 6 hours as needed for severe pain (Patient not taking: Reported on 7/3/2024) 15 tablet 0    oxyCODONE-acetaminophen (PERCOCET) 5-325 MG tablet Take 1 tablet by mouth every 6 hours as needed for severe pain (Patient not taking: Reported on 7/3/2024) 12 tablet 0     No current facility-administered medications for this visit.     No Known Allergies  Social History: denied smoking, alcohol use, or recreational drug use.  Family History   Problem Relation Age of Onset    No Known Problems Mother     Crohn's Disease Father     No Known Problems Maternal Grandmother     No Known Problems Maternal Grandfather     No Known Problems Paternal Grandmother     No Known Problems Paternal Grandfather     No Known Problems Brother     No Known Problems Son      Physical Exam:   Vitals:    10/16/24 1419   BP: 122/83   BP Location: Left arm   Patient Position: Chair   Cuff Size: Adult Regular   Pulse: 76   Resp: 18   Temp: 98.5  F (36.9  C)   TempSrc: Tympanic   Weight: 89.4 kg (197 lb)   Height: 1.499 m (4' 11\")      Estimated body mass index is 39.79 kg/m  as calculated from the following:    Height as of this encounter: 1.499 m (4' 11\").    Weight as of this encounter: 89.4 kg (197 lb).    General appearance: well-hydrated, A&O x 3, no apparent distress  Lungs: Equal expansion bilaterally, no accessory muscle use  Heart: No heaves or thrills.   Constitutional: See vitals  Abdomen: Soft, non-tender, non-distended. No rebound, rigidity, or guarding.    "

## 2024-10-16 NOTE — PROGRESS NOTES
[facility]   Pre-op H&P    Name: Franchesca Bowen   : 1990   MRN: [id number]     Proposed procedure: suction dilation and curettage  Date of Surgery/ Procedure: 10/18/2024    Franchesca Bowen, 33 year old , presents for pre-operative evaluation and assessment prior to undergoing surgery/procedure for treatment of early pregnancy loss.      Preop questions   Primary Physician: [facility]  Type of Anesthesia Anticipated: Local with MAC  History of anesthesia complications: NONE  History of  abnormal bleeding: NONE   History of blood transfusions: NO    Preoperative Questions   No - Have you ever had a heart attack or stroke?  No - Have you ever had surgery on your heart or blood vessels, such as a stent, coronary (heart) bypass, or surgery on an artery in the head, neck, heart, or legs?  No - Do you have chest pain when you are physically active?  No - Do you have a history of heart failure?  No - Do you currently have a cold, bronchitis, or symptoms of other respiratory (head and chest) infections?  No - Do you have a cough, shortness of breath, or wheezing?  No - Do you or anyone in your family have a history of blood clots?  No - Do you or anyone in your family have a serious bleeding problem, such as long-lasting bleeding after surgeries or cuts?  No - Have you had any abnormal blood loss such as black, tarry or bloody stools, or abnormal vaginal bleeding?  No - Have you ever had a blood transfusion?  Yes - Are you willing to have a blood transfusion if it is medically needed before, during, or after your surgery?  No - Have you or anyone in your family ever had problems with anesthesia (sedation for surgery)?  No - Do you have sleep apnea, excessive snoring, or daytime drowsiness?   No - Do you have any artifical heart valves or other implanted medical devices, such as a pacemaker, defibrillator, or continuous glucose monitor?  No -  "Do you have any artifical joints?  No - Are you allergic to latex?    REVIEW OF SYSTEMS:   Constitutional, HEENT, cardiovascular, pulmonary, gi and gu systems are negative, except as otherwise noted.    PMH/PSH/Meds/Allergy/SH/FH     Past Medical History:   Diagnosis Date    Chickenpox       Patient Active Problem List    Diagnosis Date Noted    Recurrent pregnancy loss 10/16/2024     Priority: Medium    Anemia due to blood loss, acute 2023     Priority: Medium    Class 2 obesity without serious comorbidity in adult 2023     Priority: Medium      Past Surgical History:   Procedure Laterality Date     SECTION N/A 3/24/2023    Procedure:  SECTION;  Surgeon: Naomi Moore MD;  Location: [facility] OR    MYRINGOTOMY, INSERT TUBE BILATERAL, COMBINED      OK REMOVE TONSILS/ADENOIDS,<11 Y/O      age 9   No Known Allergies  Social History     Tobacco Use    Smoking status: Former     Current packs/day: 0.00     Average packs/day: 0.5 packs/day for 7.0 years (3.5 ttl pk-yrs)     Types: Cigarettes     Start date: 2010     Quit date: 2017     Years since quittin.4     Passive exposure: Past    Smokeless tobacco: Never   Vaping Use    Vaping status: Never Used   Substance Use Topics    Alcohol use: Not Currently    Drug use: Never   I have reviewed this patient's family history and updated it with pertinent information if needed.  Family History   Problem Relation Age of Onset    No Known Problems Mother     Crohn's Disease Father     No Known Problems Maternal Grandmother     No Known Problems Maternal Grandfather     No Known Problems Paternal Grandmother     No Known Problems Paternal Grandfather     No Known Problems Brother     No Known Problems Son      EXAM:   /83 (BP Location: Left arm, Patient Position: Chair, Cuff Size: Adult Regular)   Pulse 76   Temp 98.5  F (36.9  C) (Tympanic)   Resp 18   Ht 1.499 m (4' 11\")   Wt 89.4 kg (197 lb)   LMP 2024   " Breastfeeding Unknown   BMI 39.79 kg/m    Gen: Resting comfortably, NAD  CV: RRR, no murmurs  Pulm: CTAB, no wheezes  Abd: Soft, appropriately ttp, non-distended    RISK ASSESSMENT:     Cardiovascular Risk:  -Patient is able to do heavy housework without chest pain.  -The patient does not have chest pain with exertion.  -Patient does not have a history of congestive heart failure.    -The patient does not have a history of stroke and does not have a history of valvular disease.    Pulmonary Risk:  -In terms of risk factors for pulmonary complication, the patient has no risk factors    Perioperative Complications:  -The patient does not have a history of bleeding or clotting problems in the past.    -The patient has not had complications from surgeries.    -The patient does not have a family history of any anesthesia or surgical complications.      IMPRESSION:     The proposed surgical procedure is considered LOW risk.    For above listed surgery and anesthesia:   Patient is at LOW risk for surgery/procedure and perioperative/procedure complications.    RECOMMENDATIONS:     Proceed to planned surgery.    Sabine Regalado MD  Obstetrics and Gynecology  [facility]   10/16/2024

## 2024-10-16 NOTE — NURSING NOTE
"Initial /83 (BP Location: Left arm, Patient Position: Chair, Cuff Size: Adult Regular)   Pulse 76   Temp 98.5  F (36.9  C) (Tympanic)   Resp 18   Ht 1.499 m (4' 11\")   Wt 89.4 kg (197 lb)   LMP 08/07/2024   BMI 39.79 kg/m   Estimated body mass index is 39.79 kg/m  as calculated from the following:    Height as of this encounter: 1.499 m (4' 11\").    Weight as of this encounter: 89.4 kg (197 lb). .    "

## 2024-10-17 ENCOUNTER — ANESTHESIA EVENT (OUTPATIENT)
Dept: SURGERY | Facility: CLINIC | Age: 34
End: 2024-10-17
Payer: COMMERCIAL

## 2024-10-17 LAB
B2 GLYCOPROT1 IGG SERPL IA-ACNC: 8.3 U/ML
B2 GLYCOPROT1 IGM SERPL IA-ACNC: <2.4 U/ML
CARDIOLIPIN IGG SER IA-ACNC: 3.8 GPL-U/ML
CARDIOLIPIN IGG SER IA-ACNC: NEGATIVE
CARDIOLIPIN IGM SER IA-ACNC: <2 MPL-U/ML
CARDIOLIPIN IGM SER IA-ACNC: NEGATIVE
DRVVT SCREEN RATIO: 1.04
INR PPP: 0.99 (ref 0.85–1.15)
LA PPP-IMP: NEGATIVE
LUPUS INTERPRETATION: NORMAL
PTT RATIO: 1.29
THROMBIN TIME: 17.8 SECONDS (ref 13–19)
VIT D+METAB SERPL-MCNC: 25 NG/ML (ref 20–50)

## 2024-10-17 ASSESSMENT — LIFESTYLE VARIABLES: TOBACCO_USE: 1

## 2024-10-17 NOTE — ANESTHESIA PREPROCEDURE EVALUATION
Anesthesia Pre-Procedure Evaluation    Patient: Franchesca Bowen   MRN: [id number] : 1990        Procedure : Procedure(s):  DILATION AND CURETTAGE, UTERUS, USING SUCTION          Past Medical History:   Diagnosis Date    Chickenpox       Past Surgical History:   Procedure Laterality Date     SECTION N/A 3/24/2023    Procedure:  SECTION;  Surgeon: Naomi Moore MD;  Location: [facility]    MYRINGOTOMY, INSERT TUBE BILATERAL, COMBINED      UT REMOVE TONSILS/ADENOIDS,<11 Y/O      age 9      No Known Allergies   Social History     Tobacco Use    Smoking status: Former     Current packs/day: 0.00     Average packs/day: 0.5 packs/day for 7.0 years (3.5 ttl pk-yrs)     Types: Cigarettes     Start date: 2010     Quit date: 2017     Years since quittin.4     Passive exposure: Past    Smokeless tobacco: Never   Substance Use Topics    Alcohol use: Not Currently      Wt Readings from Last 1 Encounters:   10/16/24 89.4 kg (197 lb)        Anesthesia Evaluation            ROS/MED HX  ENT/Pulmonary:     (+)                tobacco use, Past use,                       Neurologic:       Cardiovascular:       METS/Exercise Tolerance:     Hematologic:     (+)      anemia,          Musculoskeletal:       GI/Hepatic:       Renal/Genitourinary:       Endo:     (+)               Obesity,       Psychiatric/Substance Use:       Infectious Disease:       Malignancy:       Other:            Physical Exam    Airway        Mallampati: II   TM distance: > 3 FB   Neck ROM: full   Mouth opening: > 3 cm    Respiratory Devices and Support  Comment: Not on oxygen currently       Dental  no notable dental history         Cardiovascular   cardiovascular exam normal          Pulmonary   pulmonary exam normal                OUTSIDE LABS:  CBC:   Lab Results   Component Value Date    WBC 8.4 10/16/2024    WBC 10.9 2024    HGB 13.5 10/16/2024    HGB 13.0 2024    HCT 40.3 10/16/2024    HCT 36.7  "06/26/2024     10/16/2024     06/26/2024     BMP:   Lab Results   Component Value Date     06/26/2024     (L) 12/15/2022    POTASSIUM 3.7 06/26/2024    POTASSIUM 4.5 12/15/2022    CHLORIDE 103 06/26/2024    CHLORIDE 101 12/15/2022    CO2 20 (L) 06/26/2024    CO2 17 (L) 12/15/2022    BUN 7.9 06/26/2024    BUN 9.3 12/15/2022    CR 0.78 06/26/2024    CR 0.63 12/15/2022    GLC 93 06/26/2024    GLC 89 12/15/2022     COAGS: No results found for: \"PTT\", \"INR\", \"FIBR\"  POC:   Lab Results   Component Value Date    HCG Negative 02/05/2024    HCGS Negative 06/26/2024     HEPATIC:   Lab Results   Component Value Date    ALBUMIN 4.3 06/26/2024    PROTTOTAL 7.4 06/26/2024    ALT 23 06/26/2024    AST 26 06/26/2024    ALKPHOS 58 06/26/2024    BILITOTAL 0.3 06/26/2024     OTHER:   Lab Results   Component Value Date    LACT 2.9 (H) 02/15/2022    A1C 5.5 10/02/2024    FERNY 8.7 06/26/2024    LIPASE 227 02/15/2022    TSH 2.60 10/16/2024    CRP 5.3 02/15/2022       Anesthesia Plan    ASA Status:  3       Anesthesia Type: General.   Induction: Intravenous, Propofol.   Maintenance: TIVA.        Consents    Anesthesia Plan(s) and associated risks, benefits, and realistic alternatives discussed. Questions answered and patient/representative(s) expressed understanding.     - Discussed: Risks, Benefits and Alternatives for BOTH SEDATION and the PROCEDURE were discussed     - Discussed with:  Patient            Postoperative Care    Pain management: IV analgesics, Oral pain medications, Multi-modal analgesia.   PONV prophylaxis: Ondansetron (or other 5HT-3), Dexamethasone or Solumedrol     Comments:    Other Comments: Patient aware of plan, what to expect, and potential risks. Timeout was performed before bringing the patient back to OR, and once again, patient was asked if they had any questions           Danielle Greg, APRN CRNA    I have reviewed the pertinent notes and labs in the chart from the past 30 days and " "(re)examined the patient.  Any updates or changes from those notes are reflected in this note.                       # Obesity: Estimated body mass index is 39.79 kg/m  as calculated from the following:    Height as of 10/16/24: 1.499 m (4' 11\").    Weight as of 10/16/24: 89.4 kg (197 lb).             "

## 2024-10-17 NOTE — PROCEDURES
Operative Report    DOS: 10/17/2024  Preoperative Diagnosis:  6  week fetal demise.  Postoperative Diagnosis:  Same.  Procedure:  Suction D&C.  Surgeon:  Luzmaria Winston MD  Anesthesia:  Sedation.  Findings:  Normal cervix. Moderate amount of tissue evacuated from the uterus.  EBL: 10 ccs  Specimen:  Products of conception. Tissue in saline for cytogenetics.  If enough tissue remaining, gross and micro ordered.  Complications: none    Procedure:    Prior to the procedure, we reviewed information on cytogenetics and pathology.  She wants cytogenetics first and gross and micro only if there is enough tissue for both.  She would like hospital disposal after testing.  Information reviewed, pamphlets given and consents signed     The patient was taken the operating room.  Sedation was initiated without difficulty.  She was then prepped and draped in the normal sterile fashion in the dorsal lithotomy position with Moises stirrups.  Her bladder was drained of all urine.   A speculum was placed in the vagina and the cervix was grasped with an Allis. The cervix was easily dilated to 8 mm diameter with Hegar dilators. A 8 mm curved suction curette was placed in the uterus and products of conception were then removed. Then, gentle sharp curette was undertaken until a good Beth was noted throughout the entire uterus. A pass with the suction curette was clear of debris.  There was minimal bleeding noted at the end of the procedure.    The counts were correct times two. The Allis was removed from the cervix.  Hemostasis was assured.  The patient tolerated the procedure well. Sponge, lap, needle and instrument counts were correct x2.  She was awakened and taken to the recovery room in stable condition.    Luzmaria Harp MD .................... 10/18/2024    9:20 AM

## 2024-10-18 ENCOUNTER — HOSPITAL ENCOUNTER (OUTPATIENT)
Facility: CLINIC | Age: 34
Discharge: HOME | End: 2024-10-18
Attending: OBSTETRICS & GYNECOLOGY | Admitting: OBSTETRICS & GYNECOLOGY
Payer: COMMERCIAL

## 2024-10-18 ENCOUNTER — TELEPHONE (OUTPATIENT)
Dept: OBGYN | Facility: CLINIC | Age: 34
End: 2024-10-18

## 2024-10-18 ENCOUNTER — ANESTHESIA (OUTPATIENT)
Dept: SURGERY | Facility: CLINIC | Age: 34
End: 2024-10-18
Payer: COMMERCIAL

## 2024-10-18 VITALS
BODY MASS INDEX: 39.72 KG/M2 | TEMPERATURE: 97.8 F | WEIGHT: 197 LBS | HEIGHT: 59 IN | OXYGEN SATURATION: 99 % | HEART RATE: 72 BPM | SYSTOLIC BLOOD PRESSURE: 110 MMHG | RESPIRATION RATE: 16 BRPM | DIASTOLIC BLOOD PRESSURE: 73 MMHG

## 2024-10-18 DIAGNOSIS — Z98.890 POSTOPERATIVE STATE: Primary | ICD-10-CM

## 2024-10-18 DIAGNOSIS — N96 RECURRENT PREGNANCY LOSS: Primary | ICD-10-CM

## 2024-10-18 PROCEDURE — 370N000017 HC ANESTHESIA TECHNICAL FEE, PER MIN: Performed by: OBSTETRICS & GYNECOLOGY

## 2024-10-18 PROCEDURE — 999N000141 HC STATISTIC PRE-PROCEDURE NURSING ASSESSMENT: Performed by: OBSTETRICS & GYNECOLOGY

## 2024-10-18 PROCEDURE — 59820 CARE OF MISCARRIAGE: CPT | Performed by: OBSTETRICS & GYNECOLOGY

## 2024-10-18 PROCEDURE — 88291 CYTO/MOLECULAR REPORT: CPT | Performed by: MEDICAL GENETICS

## 2024-10-18 PROCEDURE — 360N000075 HC SURGERY LEVEL 2, PER MIN: Performed by: OBSTETRICS & GYNECOLOGY

## 2024-10-18 PROCEDURE — 272N000001 HC OR GENERAL SUPPLY STERILE: Performed by: OBSTETRICS & GYNECOLOGY

## 2024-10-18 PROCEDURE — 250N000011 HC RX IP 250 OP 636

## 2024-10-18 PROCEDURE — 710N000012 HC RECOVERY PHASE 2, PER MINUTE: Performed by: OBSTETRICS & GYNECOLOGY

## 2024-10-18 PROCEDURE — 271N000001 HC OR GENERAL SUPPLY NON-STERILE: Performed by: OBSTETRICS & GYNECOLOGY

## 2024-10-18 PROCEDURE — 258N000003 HC RX IP 258 OP 636: Performed by: NURSE ANESTHETIST, CERTIFIED REGISTERED

## 2024-10-18 PROCEDURE — 258N000003 HC RX IP 258 OP 636: Performed by: STUDENT IN AN ORGANIZED HEALTH CARE EDUCATION/TRAINING PROGRAM

## 2024-10-18 PROCEDURE — 250N000011 HC RX IP 250 OP 636: Performed by: STUDENT IN AN ORGANIZED HEALTH CARE EDUCATION/TRAINING PROGRAM

## 2024-10-18 PROCEDURE — 250N000013 HC RX MED GY IP 250 OP 250 PS 637: Performed by: NURSE ANESTHETIST, CERTIFIED REGISTERED

## 2024-10-18 PROCEDURE — 88305 TISSUE EXAM BY PATHOLOGIST: CPT | Mod: 26 | Performed by: PATHOLOGY

## 2024-10-18 PROCEDURE — 88262 CHROMOSOME ANALYSIS 15-20: CPT | Performed by: OBSTETRICS & GYNECOLOGY

## 2024-10-18 PROCEDURE — 88305 TISSUE EXAM BY PATHOLOGIST: CPT | Mod: TC | Performed by: OBSTETRICS & GYNECOLOGY

## 2024-10-18 RX ORDER — ONDANSETRON 4 MG/1
4 TABLET, ORALLY DISINTEGRATING ORAL ONCE
Status: COMPLETED | OUTPATIENT
Start: 2024-10-18 | End: 2024-10-18

## 2024-10-18 RX ORDER — ACETAMINOPHEN 325 MG/1
975 TABLET ORAL ONCE
Status: COMPLETED | OUTPATIENT
Start: 2024-10-18 | End: 2024-10-18

## 2024-10-18 RX ORDER — DEXAMETHASONE SODIUM PHOSPHATE 4 MG/ML
INJECTION, SOLUTION INTRA-ARTICULAR; INTRALESIONAL; INTRAMUSCULAR; INTRAVENOUS; SOFT TISSUE PRN
Status: DISCONTINUED | OUTPATIENT
Start: 2024-10-18 | End: 2024-10-18

## 2024-10-18 RX ORDER — ACETAMINOPHEN 325 MG/1
975 TABLET ORAL ONCE
Status: DISCONTINUED | OUTPATIENT
Start: 2024-10-18 | End: 2024-10-18 | Stop reason: HOSPADM

## 2024-10-18 RX ORDER — ONDANSETRON 2 MG/ML
INJECTION INTRAMUSCULAR; INTRAVENOUS PRN
Status: DISCONTINUED | OUTPATIENT
Start: 2024-10-18 | End: 2024-10-18

## 2024-10-18 RX ORDER — FENTANYL CITRATE 50 UG/ML
INJECTION, SOLUTION INTRAMUSCULAR; INTRAVENOUS PRN
Status: DISCONTINUED | OUTPATIENT
Start: 2024-10-18 | End: 2024-10-18

## 2024-10-18 RX ORDER — LIDOCAINE 40 MG/G
CREAM TOPICAL
Status: DISCONTINUED | OUTPATIENT
Start: 2024-10-18 | End: 2024-10-18 | Stop reason: HOSPADM

## 2024-10-18 RX ORDER — IBUPROFEN 800 MG/1
800 TABLET, FILM COATED ORAL ONCE
Status: COMPLETED | OUTPATIENT
Start: 2024-10-18 | End: 2024-10-18

## 2024-10-18 RX ORDER — PROPOFOL 10 MG/ML
INJECTION, EMULSION INTRAVENOUS PRN
Status: DISCONTINUED | OUTPATIENT
Start: 2024-10-18 | End: 2024-10-18

## 2024-10-18 RX ORDER — ACETAMINOPHEN 325 MG/1
975 TABLET ORAL EVERY 6 HOURS PRN
Qty: 50 TABLET | Refills: 0 | Status: SHIPPED | OUTPATIENT
Start: 2024-10-18

## 2024-10-18 RX ORDER — SODIUM CHLORIDE, SODIUM LACTATE, POTASSIUM CHLORIDE, CALCIUM CHLORIDE 600; 310; 30; 20 MG/100ML; MG/100ML; MG/100ML; MG/100ML
INJECTION, SOLUTION INTRAVENOUS CONTINUOUS
Status: DISCONTINUED | OUTPATIENT
Start: 2024-10-18 | End: 2024-10-18 | Stop reason: HOSPADM

## 2024-10-18 RX ORDER — ONDANSETRON 2 MG/ML
4 INJECTION INTRAMUSCULAR; INTRAVENOUS ONCE
Status: COMPLETED | OUTPATIENT
Start: 2024-10-18 | End: 2024-10-18

## 2024-10-18 RX ORDER — IBUPROFEN 800 MG/1
800 TABLET, FILM COATED ORAL EVERY 8 HOURS PRN
Qty: 30 TABLET | Refills: 0 | Status: SHIPPED | OUTPATIENT
Start: 2024-10-18

## 2024-10-18 RX ORDER — ACETAMINOPHEN 325 MG/1
975 TABLET ORAL ONCE
Status: DISCONTINUED | OUTPATIENT
Start: 2024-10-18 | End: 2024-10-18

## 2024-10-18 RX ORDER — GABAPENTIN 300 MG/1
300 CAPSULE ORAL
Status: COMPLETED | OUTPATIENT
Start: 2024-10-18 | End: 2024-10-18

## 2024-10-18 RX ORDER — LEVOTHYROXINE SODIUM 25 UG/1
25 TABLET ORAL DAILY
Qty: 90 TABLET | Refills: 3 | Status: SHIPPED | OUTPATIENT
Start: 2024-10-18

## 2024-10-18 RX ORDER — IBUPROFEN 800 MG/1
800 TABLET, FILM COATED ORAL ONCE
Status: DISCONTINUED | OUTPATIENT
Start: 2024-10-18 | End: 2024-10-18

## 2024-10-18 RX ADMIN — MIDAZOLAM 2 MG: 1 INJECTION INTRAMUSCULAR; INTRAVENOUS at 08:41

## 2024-10-18 RX ADMIN — DEXAMETHASONE SODIUM PHOSPHATE 4 MG: 4 INJECTION, SOLUTION INTRA-ARTICULAR; INTRALESIONAL; INTRAMUSCULAR; INTRAVENOUS; SOFT TISSUE at 09:09

## 2024-10-18 RX ADMIN — ACETAMINOPHEN 975 MG: 325 TABLET ORAL at 07:31

## 2024-10-18 RX ADMIN — FENTANYL CITRATE 50 MCG: 50 INJECTION INTRAMUSCULAR; INTRAVENOUS at 08:44

## 2024-10-18 RX ADMIN — PROPOFOL 200 MCG/KG/MIN: 10 INJECTION, EMULSION INTRAVENOUS at 08:49

## 2024-10-18 RX ADMIN — MIDAZOLAM 2 MG: 1 INJECTION INTRAMUSCULAR; INTRAVENOUS at 08:44

## 2024-10-18 RX ADMIN — PROPOFOL 100 MG: 10 INJECTION, EMULSION INTRAVENOUS at 08:48

## 2024-10-18 RX ADMIN — DOXYCYCLINE 200 MG: 100 INJECTION, POWDER, LYOPHILIZED, FOR SOLUTION INTRAVENOUS at 07:40

## 2024-10-18 RX ADMIN — SODIUM CHLORIDE, POTASSIUM CHLORIDE, SODIUM LACTATE AND CALCIUM CHLORIDE: 600; 310; 30; 20 INJECTION, SOLUTION INTRAVENOUS at 07:31

## 2024-10-18 RX ADMIN — FENTANYL CITRATE 50 MCG: 50 INJECTION INTRAMUSCULAR; INTRAVENOUS at 08:59

## 2024-10-18 RX ADMIN — ONDANSETRON 4 MG: 2 INJECTION INTRAMUSCULAR; INTRAVENOUS at 07:32

## 2024-10-18 RX ADMIN — GABAPENTIN 300 MG: 300 CAPSULE ORAL at 07:31

## 2024-10-18 RX ADMIN — IBUPROFEN 800 MG: 800 TABLET, FILM COATED ORAL at 09:49

## 2024-10-18 RX ADMIN — ONDANSETRON 4 MG: 2 INJECTION INTRAMUSCULAR; INTRAVENOUS at 09:09

## 2024-10-18 ASSESSMENT — ACTIVITIES OF DAILY LIVING (ADL)
ADLS_ACUITY_SCORE: 29

## 2024-10-18 NOTE — OR NURSING
[facility] DISCHARGE NOTE    Patient discharged to home at 10:19 AM via ambulation. Accompanied by spouse and staff. Discharge instructions reviewed with patient and spouse, opportunity offered to ask questions. Prescriptions filled and sent with patient upon discharge. All belongings sent with patient.    Melinda Best RN

## 2024-10-18 NOTE — TELEPHONE ENCOUNTER
"I called the OR to ask them to follow up on this, Their response was to \"call Dr. Harp\" I continued to explain what was needed, and they took the number down for pathology to call them.     Katherin Hand LPN    "

## 2024-10-18 NOTE — ANESTHESIA POSTPROCEDURE EVALUATION
Patient: Franchesca Bowen    Procedure: Procedure(s):  DILATION AND CURETTAGE, UTERUS, USING SUCTION       Anesthesia Type:  No value filed.    Note:  Disposition: Outpatient   Postop Pain Control: Uneventful            Sign Out: Well controlled pain   PONV: No   Neuro/Psych: Uneventful            Sign Out: Acceptable/Baseline neuro status   Airway/Respiratory: Uneventful            Sign Out: Acceptable/Baseline resp. status   CV/Hemodynamics: Uneventful            Sign Out: Acceptable CV status; No obvious hypovolemia; No obvious fluid overload   Other NRE:    DID A NON-ROUTINE EVENT OCCUR?            Last vitals:  Vitals Value Taken Time   /73 10/18/24 1000   Temp 37.1  C (98.7  F) 10/18/24 0920   Pulse 72 10/18/24 1000   Resp 17 10/18/24 1000   SpO2 95 % 10/18/24 1000       Electronically Signed By: NEFTALI Lobato CRNA  October 18, 2024  10:09 AM

## 2024-10-18 NOTE — ANESTHESIA CARE TRANSFER NOTE
Patient: Franchesca Bowen    Procedure: Procedure(s):  DILATION AND CURETTAGE, UTERUS, USING SUCTION       Diagnosis: Miscarriage [O03.9]  Diagnosis Additional Information: No value filed.    Anesthesia Type:   No value filed.     Note:    Oropharynx: oropharynx clear of all foreign objects  Level of Consciousness: awake      Independent Airway: airway patency satisfactory and stable  Dentition: dentition unchanged  Vital Signs Stable: post-procedure vital signs reviewed and stable  Report to RN Given: handoff report given  Patient transferred to: Phase II    Handoff Report: Identifed the Patient, Identified the Reponsible Provider, Reviewed the pertinent medical history, Discussed the surgical course, Reviewed Intra-OP anesthesia mangement and issues during anesthesia, Set expectations for post-procedure period and Allowed opportunity for questions and acknowledgement of understanding      Vitals:  Vitals Value Taken Time   /73 10/18/24 1000   Temp 37.1  C (98.7  F) 10/18/24 0920   Pulse 72 10/18/24 1000   Resp 17 10/18/24 1000   SpO2 95 % 10/18/24 1000       Electronically Signed By: NEFTALI Lobato CRNA  October 18, 2024  10:09 AM

## 2024-10-18 NOTE — DISCHARGE INSTRUCTIONS
Same Day Surgery Discharge Instructions  Special Precautions After Surgery - Adult    It is not unusual to feel lightheaded or faint, up to 24 hours after surgery or while taking pain medication.  If you have these symptoms; sit for a few minutes before standing and have someone assist you when getting up.  You should rest and relax for the next 24 hours and must have someone stay with you for at least 24 hours after your discharge.  DO NOT DRIVE any vehicle or operate mechanical equipment for 24 hours following the end of your surgery.  DO NOT DRIVE while taking narcotic pain medications that have been prescribed by your physician.  If you had a limb operated on, you must be able to use it fully to drive.  DO NOT drink alcoholic beverages for 24 hours following surgery or while taking prescription pain medication.  Drink clear liquids (apple juice, ginger ale, broth, 7-Up, etc.).  Progress to your regular diet as you feel able.  Any questions call your physician and do not make important decisions for 24 hours.    Nausea and Vomiting: Nausea and vomiting can occur any time after receiving anesthesia. If you experience nausea and vomiting we encourage you to move to a clear liquid diet and advance your diet as tolerated. If nausea and vomiting do not improve within 12 hours please call the surgeon or present to the Emergency department.     Break-through Bleeding: If your experience bleeding from your surgical site apply pressure and additional dressing per nurse instruction. For simple problems such as a saturated dressing, you may need to reinforce the dressing with more gauze and tape and put slight pressure on the site. If bleeding does not subside contact the surgeon or present to the Emergency Department.    Post-op Infection: If you develop a fever of 100.4 or greater, have pus like drainage, redness, swelling or severe pain at the surgical site not alleviated with pain medications; please  contact the surgeon or present to the Emergency Department.     Medications:  Acetaminophen (Tylenol):  Next dose: 1:30 pm.  Ibuprofen (Motrin, Advil):  Next dose: 4:00 pm.  Follow the instructions on the bottle.  __________________________________________________________________________________________________________________________________  IMPORTANT NUMBERS:    [facility] [place]  [phone], [phone]  Pharmacy:  [phone]  Same Day Surgery:  [phone], for general post-op questions call Monday - Thursday until 8:30 p.m., Fridays until 6:00 p.m.   Mental Health Mobile Crisis line: [phone]                                                                           OB Clinic:  [phone]

## 2024-10-18 NOTE — TELEPHONE ENCOUNTER
Pathology calling stating they needed an order and a consent form for this patient. If needed a call can be returned at [phone].      Consent form for chromosome analysis needed.    Surg. Path order needed also as there is plenty of tissue.    Katherin Hand LPN

## 2024-10-21 LAB
PATH REPORT.COMMENTS IMP SPEC: NORMAL
PATH REPORT.FINAL DX SPEC: NORMAL
PATH REPORT.GROSS SPEC: NORMAL
PATH REPORT.MICROSCOPIC SPEC OTHER STN: NORMAL
PATH REPORT.RELEVANT HX SPEC: NORMAL
PHOTO IMAGE: NORMAL

## 2024-10-26 ENCOUNTER — OFFICE VISIT (OUTPATIENT)
Dept: URGENT CARE | Facility: URGENT CARE | Age: 34
End: 2024-10-26
Payer: COMMERCIAL

## 2024-10-26 VITALS
OXYGEN SATURATION: 99 % | BODY MASS INDEX: 39.36 KG/M2 | DIASTOLIC BLOOD PRESSURE: 80 MMHG | SYSTOLIC BLOOD PRESSURE: 121 MMHG | WEIGHT: 195 LBS | RESPIRATION RATE: 16 BRPM | HEART RATE: 70 BPM | TEMPERATURE: 98.7 F

## 2024-10-26 DIAGNOSIS — H65.92 OME (OTITIS MEDIA WITH EFFUSION), LEFT: Primary | ICD-10-CM

## 2024-10-26 PROCEDURE — 99213 OFFICE O/P EST LOW 20 MIN: CPT | Performed by: FAMILY MEDICINE

## 2024-10-26 NOTE — PROGRESS NOTES
(H65.92) OME (otitis media with effusion), left  (primary encounter diagnosis)  Comment:   Plan: amoxicillin-clavulanate (AUGMENTIN) 875-125 MG         tablet        Continue symptomatic measures such as ibuprofen.      CHIEF COMPLAINT    Sore throat.  Left ear pain.      HISTORY    Within the last day, patient initially had sore throat and then this morning realized she has left ear pain.  Has not noticed drainage.  Has not been febrile.  Not particularly congested.      REVIEW OF SYSTEMS    Unremarkable except as above.        EXAM  /80   Pulse 70   Temp 98.7  F (37.1  C)   Resp 16   Wt 88.5 kg (195 lb)   LMP 08/07/2024   SpO2 99%   BMI 39.36 kg/m      Left TM is reddened and indistinct landmarks are noted.  Right TM WNL.  Pharynx moderately red posteriorly.  No significant cervical adenopathy.  No labored breathing or wheezing.

## 2024-11-12 LAB
ADDITIONAL COMMENTS: NORMAL
INTERPRETATION: NORMAL
ISCN: NORMAL
METHODS: NORMAL

## 2025-01-29 ENCOUNTER — APPOINTMENT (OUTPATIENT)
Dept: ULTRASOUND IMAGING | Facility: CLINIC | Age: 35
End: 2025-01-29
Attending: EMERGENCY MEDICINE

## 2025-01-29 ENCOUNTER — ANESTHESIA (OUTPATIENT)
Dept: SURGERY | Facility: CLINIC | Age: 35
End: 2025-01-29

## 2025-01-29 ENCOUNTER — ANESTHESIA EVENT (OUTPATIENT)
Dept: SURGERY | Facility: CLINIC | Age: 35
End: 2025-01-29

## 2025-01-29 ENCOUNTER — APPOINTMENT (OUTPATIENT)
Dept: ULTRASOUND IMAGING | Facility: CLINIC | Age: 35
End: 2025-01-29
Attending: FAMILY MEDICINE

## 2025-01-29 ENCOUNTER — HOSPITAL ENCOUNTER (EMERGENCY)
Facility: CLINIC | Age: 35
Discharge: HOME | End: 2025-01-29
Attending: EMERGENCY MEDICINE

## 2025-01-29 VITALS
BODY MASS INDEX: 38.3 KG/M2 | RESPIRATION RATE: 23 BRPM | HEIGHT: 59 IN | SYSTOLIC BLOOD PRESSURE: 116 MMHG | WEIGHT: 190 LBS | DIASTOLIC BLOOD PRESSURE: 75 MMHG | OXYGEN SATURATION: 96 % | HEART RATE: 92 BPM | TEMPERATURE: 97.5 F

## 2025-01-29 DIAGNOSIS — Z98.890 S/P LAPAROSCOPY: ICD-10-CM

## 2025-01-29 DIAGNOSIS — O00.201 ECTOPIC PREGNANCY OF RIGHT OVARY: ICD-10-CM

## 2025-01-29 DIAGNOSIS — O00.90 ECTOPIC PREGNANCY, UNSPECIFIED LOCATION, UNSPECIFIED WHETHER INTRAUTERINE PREGNANCY PRESENT: Primary | ICD-10-CM

## 2025-01-29 LAB
ABO + RH BLD: NORMAL
ALBUMIN SERPL BCG-MCNC: 4.1 G/DL (ref 3.5–5.2)
ALP SERPL-CCNC: 49 U/L (ref 40–150)
ALT SERPL W P-5'-P-CCNC: 19 U/L (ref 0–50)
ANION GAP SERPL CALCULATED.3IONS-SCNC: 16 MMOL/L (ref 7–15)
AST SERPL W P-5'-P-CCNC: 18 U/L (ref 0–45)
BASOPHILS # BLD AUTO: 0 10E3/UL (ref 0–0.2)
BASOPHILS NFR BLD AUTO: 0 %
BILIRUB SERPL-MCNC: 0.4 MG/DL
BLD GP AB SCN SERPL QL: NEGATIVE
BLD PROD TYP BPU: NORMAL
BLD PROD TYP BPU: NORMAL
BLOOD COMPONENT TYPE: NORMAL
BLOOD COMPONENT TYPE: NORMAL
BUN SERPL-MCNC: 8.7 MG/DL (ref 6–20)
CALCIUM SERPL-MCNC: 8.7 MG/DL (ref 8.8–10.4)
CHLORIDE SERPL-SCNC: 101 MMOL/L (ref 98–107)
CODING SYSTEM: NORMAL
CODING SYSTEM: NORMAL
CREAT SERPL-MCNC: 0.76 MG/DL (ref 0.51–0.95)
CROSSMATCH: NORMAL
CROSSMATCH: NORMAL
EGFRCR SERPLBLD CKD-EPI 2021: >90 ML/MIN/1.73M2
EOSINOPHIL # BLD AUTO: 0 10E3/UL (ref 0–0.7)
EOSINOPHIL NFR BLD AUTO: 0 %
ERYTHROCYTE [DISTWIDTH] IN BLOOD BY AUTOMATED COUNT: 13 % (ref 10–15)
GLUCOSE SERPL-MCNC: 167 MG/DL (ref 70–99)
HCG INTACT+B SERPL-ACNC: 3470 MIU/ML
HCO3 SERPL-SCNC: 19 MMOL/L (ref 22–29)
HCT VFR BLD AUTO: 33.7 % (ref 35–47)
HGB BLD-MCNC: 11.3 G/DL (ref 11.7–15.7)
HOLD SPECIMEN: NORMAL
IMM GRANULOCYTES # BLD: 0.1 10E3/UL
IMM GRANULOCYTES NFR BLD: 0 %
LIPASE SERPL-CCNC: 20 U/L (ref 13–60)
LYMPHOCYTES # BLD AUTO: 0.9 10E3/UL (ref 0.8–5.3)
LYMPHOCYTES NFR BLD AUTO: 8 %
MCH RBC QN AUTO: 30.8 PG (ref 26.5–33)
MCHC RBC AUTO-ENTMCNC: 33.5 G/DL (ref 31.5–36.5)
MCV RBC AUTO: 92 FL (ref 78–100)
MONOCYTES # BLD AUTO: 0.3 10E3/UL (ref 0–1.3)
MONOCYTES NFR BLD AUTO: 3 %
NEUTROPHILS # BLD AUTO: 11 10E3/UL (ref 1.6–8.3)
NEUTROPHILS NFR BLD AUTO: 89 %
NRBC # BLD AUTO: 0 10E3/UL
NRBC BLD AUTO-RTO: 0 /100
PLATELET # BLD AUTO: 305 10E3/UL (ref 150–450)
POTASSIUM SERPL-SCNC: 4.2 MMOL/L (ref 3.4–5.3)
PROT SERPL-MCNC: 6.9 G/DL (ref 6.4–8.3)
RBC # BLD AUTO: 3.67 10E6/UL (ref 3.8–5.2)
SODIUM SERPL-SCNC: 136 MMOL/L (ref 135–145)
SPECIMEN EXP DATE BLD: NORMAL
UNIT ABO/RH: NORMAL
UNIT ABO/RH: NORMAL
UNIT NUMBER: NORMAL
UNIT NUMBER: NORMAL
UNIT STATUS: NORMAL
UNIT STATUS: NORMAL
UNIT TYPE ISBT: 7300
UNIT TYPE ISBT: 7300
WBC # BLD AUTO: 12.4 10E3/UL (ref 4–11)

## 2025-01-29 PROCEDURE — 360N000077 HC SURGERY LEVEL 4, PER MIN: Performed by: STUDENT IN AN ORGANIZED HEALTH CARE EDUCATION/TRAINING PROGRAM

## 2025-01-29 PROCEDURE — 710N000012 HC RECOVERY PHASE 2, PER MINUTE: Performed by: STUDENT IN AN ORGANIZED HEALTH CARE EDUCATION/TRAINING PROGRAM

## 2025-01-29 PROCEDURE — 258N000003 HC RX IP 258 OP 636: Performed by: EMERGENCY MEDICINE

## 2025-01-29 PROCEDURE — 86923 COMPATIBILITY TEST ELECTRIC: CPT | Performed by: STUDENT IN AN ORGANIZED HEALTH CARE EDUCATION/TRAINING PROGRAM

## 2025-01-29 PROCEDURE — 36415 COLL VENOUS BLD VENIPUNCTURE: CPT | Performed by: STUDENT IN AN ORGANIZED HEALTH CARE EDUCATION/TRAINING PROGRAM

## 2025-01-29 PROCEDURE — 36415 COLL VENOUS BLD VENIPUNCTURE: CPT | Performed by: FAMILY MEDICINE

## 2025-01-29 PROCEDURE — 85004 AUTOMATED DIFF WBC COUNT: CPT | Performed by: FAMILY MEDICINE

## 2025-01-29 PROCEDURE — 59151 TREAT ECTOPIC PREGNANCY: CPT | Performed by: STUDENT IN AN ORGANIZED HEALTH CARE EDUCATION/TRAINING PROGRAM

## 2025-01-29 PROCEDURE — 86850 RBC ANTIBODY SCREEN: CPT | Performed by: STUDENT IN AN ORGANIZED HEALTH CARE EDUCATION/TRAINING PROGRAM

## 2025-01-29 PROCEDURE — 83690 ASSAY OF LIPASE: CPT | Performed by: FAMILY MEDICINE

## 2025-01-29 PROCEDURE — 250N000011 HC RX IP 250 OP 636: Performed by: NURSE ANESTHETIST, CERTIFIED REGISTERED

## 2025-01-29 PROCEDURE — 250N000009 HC RX 250: Performed by: STUDENT IN AN ORGANIZED HEALTH CARE EDUCATION/TRAINING PROGRAM

## 2025-01-29 PROCEDURE — 84075 ASSAY ALKALINE PHOSPHATASE: CPT | Performed by: FAMILY MEDICINE

## 2025-01-29 PROCEDURE — 86900 BLOOD TYPING SEROLOGIC ABO: CPT | Performed by: STUDENT IN AN ORGANIZED HEALTH CARE EDUCATION/TRAINING PROGRAM

## 2025-01-29 PROCEDURE — 250N000013 HC RX MED GY IP 250 OP 250 PS 637: Performed by: NURSE ANESTHETIST, CERTIFIED REGISTERED

## 2025-01-29 PROCEDURE — 80053 COMPREHEN METABOLIC PANEL: CPT | Performed by: EMERGENCY MEDICINE

## 2025-01-29 PROCEDURE — 272N000001 HC OR GENERAL SUPPLY STERILE: Performed by: STUDENT IN AN ORGANIZED HEALTH CARE EDUCATION/TRAINING PROGRAM

## 2025-01-29 PROCEDURE — 250N000011 HC RX IP 250 OP 636: Performed by: FAMILY MEDICINE

## 2025-01-29 PROCEDURE — 258N000003 HC RX IP 258 OP 636: Performed by: NURSE ANESTHETIST, CERTIFIED REGISTERED

## 2025-01-29 PROCEDURE — 250N000025 HC SEVOFLURANE, PER MIN: Performed by: STUDENT IN AN ORGANIZED HEALTH CARE EDUCATION/TRAINING PROGRAM

## 2025-01-29 PROCEDURE — 96375 TX/PRO/DX INJ NEW DRUG ADDON: CPT

## 2025-01-29 PROCEDURE — 250N000011 HC RX IP 250 OP 636: Performed by: EMERGENCY MEDICINE

## 2025-01-29 PROCEDURE — 83690 ASSAY OF LIPASE: CPT | Performed by: EMERGENCY MEDICINE

## 2025-01-29 PROCEDURE — 84155 ASSAY OF PROTEIN SERUM: CPT | Performed by: FAMILY MEDICINE

## 2025-01-29 PROCEDURE — 76705 ECHO EXAM OF ABDOMEN: CPT

## 2025-01-29 PROCEDURE — 85025 COMPLETE CBC W/AUTO DIFF WBC: CPT | Performed by: EMERGENCY MEDICINE

## 2025-01-29 PROCEDURE — 88305 TISSUE EXAM BY PATHOLOGIST: CPT | Mod: 26 | Performed by: PATHOLOGY

## 2025-01-29 PROCEDURE — 76801 OB US < 14 WKS SINGLE FETUS: CPT

## 2025-01-29 PROCEDURE — 88305 TISSUE EXAM BY PATHOLOGIST: CPT | Mod: TC | Performed by: STUDENT IN AN ORGANIZED HEALTH CARE EDUCATION/TRAINING PROGRAM

## 2025-01-29 PROCEDURE — 84702 CHORIONIC GONADOTROPIN TEST: CPT | Performed by: EMERGENCY MEDICINE

## 2025-01-29 PROCEDURE — 370N000017 HC ANESTHESIA TECHNICAL FEE, PER MIN: Performed by: STUDENT IN AN ORGANIZED HEALTH CARE EDUCATION/TRAINING PROGRAM

## 2025-01-29 PROCEDURE — 59151 TREAT ECTOPIC PREGNANCY: CPT | Mod: AS | Performed by: PHYSICIAN ASSISTANT

## 2025-01-29 PROCEDURE — 710N000009 HC RECOVERY PHASE 1, LEVEL 1, PER MIN: Performed by: STUDENT IN AN ORGANIZED HEALTH CARE EDUCATION/TRAINING PROGRAM

## 2025-01-29 PROCEDURE — 99214 OFFICE O/P EST MOD 30 MIN: CPT | Mod: 25 | Performed by: STUDENT IN AN ORGANIZED HEALTH CARE EDUCATION/TRAINING PROGRAM

## 2025-01-29 PROCEDURE — 271N000001 HC OR GENERAL SUPPLY NON-STERILE: Performed by: STUDENT IN AN ORGANIZED HEALTH CARE EDUCATION/TRAINING PROGRAM

## 2025-01-29 PROCEDURE — 99285 EMERGENCY DEPT VISIT HI MDM: CPT | Mod: 25 | Performed by: EMERGENCY MEDICINE

## 2025-01-29 PROCEDURE — 250N000009 HC RX 250: Performed by: NURSE ANESTHETIST, CERTIFIED REGISTERED

## 2025-01-29 PROCEDURE — 999N000141 HC STATISTIC PRE-PROCEDURE NURSING ASSESSMENT: Performed by: STUDENT IN AN ORGANIZED HEALTH CARE EDUCATION/TRAINING PROGRAM

## 2025-01-29 PROCEDURE — 85014 HEMATOCRIT: CPT | Performed by: FAMILY MEDICINE

## 2025-01-29 PROCEDURE — 96374 THER/PROPH/DIAG INJ IV PUSH: CPT

## 2025-01-29 PROCEDURE — 99285 EMERGENCY DEPT VISIT HI MDM: CPT | Mod: 25

## 2025-01-29 RX ORDER — NALOXONE HYDROCHLORIDE 0.4 MG/ML
0.1 INJECTION, SOLUTION INTRAMUSCULAR; INTRAVENOUS; SUBCUTANEOUS
Status: DISCONTINUED | OUTPATIENT
Start: 2025-01-29 | End: 2025-01-29 | Stop reason: HOSPADM

## 2025-01-29 RX ORDER — PROPOFOL 10 MG/ML
INJECTION, EMULSION INTRAVENOUS CONTINUOUS PRN
Status: DISCONTINUED | OUTPATIENT
Start: 2025-01-29 | End: 2025-01-29

## 2025-01-29 RX ORDER — CEFAZOLIN SODIUM/WATER 2 G/20 ML
SYRINGE (ML) INTRAVENOUS PRN
Status: DISCONTINUED | OUTPATIENT
Start: 2025-01-29 | End: 2025-01-29

## 2025-01-29 RX ORDER — PROPOFOL 10 MG/ML
INJECTION, EMULSION INTRAVENOUS PRN
Status: DISCONTINUED | OUTPATIENT
Start: 2025-01-29 | End: 2025-01-29

## 2025-01-29 RX ORDER — DEXMEDETOMIDINE HYDROCHLORIDE 4 UG/ML
INJECTION, SOLUTION INTRAVENOUS PRN
Status: DISCONTINUED | OUTPATIENT
Start: 2025-01-29 | End: 2025-01-29

## 2025-01-29 RX ORDER — SODIUM CHLORIDE, SODIUM LACTATE, POTASSIUM CHLORIDE, CALCIUM CHLORIDE 600; 310; 30; 20 MG/100ML; MG/100ML; MG/100ML; MG/100ML
INJECTION, SOLUTION INTRAVENOUS CONTINUOUS
Status: DISCONTINUED | OUTPATIENT
Start: 2025-01-29 | End: 2025-01-29 | Stop reason: HOSPADM

## 2025-01-29 RX ORDER — KETOROLAC TROMETHAMINE 15 MG/ML
30 INJECTION, SOLUTION INTRAMUSCULAR; INTRAVENOUS
Status: COMPLETED | OUTPATIENT
Start: 2025-01-29 | End: 2025-01-29

## 2025-01-29 RX ORDER — ONDANSETRON 2 MG/ML
4 INJECTION INTRAMUSCULAR; INTRAVENOUS EVERY 30 MIN PRN
Status: DISCONTINUED | OUTPATIENT
Start: 2025-01-29 | End: 2025-01-29 | Stop reason: HOSPADM

## 2025-01-29 RX ORDER — SODIUM CHLORIDE 9 MG/ML
INJECTION, SOLUTION INTRAVENOUS CONTINUOUS PRN
Status: DISCONTINUED | OUTPATIENT
Start: 2025-01-29 | End: 2025-01-29

## 2025-01-29 RX ORDER — FENTANYL CITRATE 50 UG/ML
INJECTION, SOLUTION INTRAMUSCULAR; INTRAVENOUS PRN
Status: DISCONTINUED | OUTPATIENT
Start: 2025-01-29 | End: 2025-01-29

## 2025-01-29 RX ORDER — HYDROMORPHONE HCL IN WATER/PF 6 MG/30 ML
0.2 PATIENT CONTROLLED ANALGESIA SYRINGE INTRAVENOUS EVERY 5 MIN PRN
Status: DISCONTINUED | OUTPATIENT
Start: 2025-01-29 | End: 2025-01-29 | Stop reason: HOSPADM

## 2025-01-29 RX ORDER — LIDOCAINE HYDROCHLORIDE 20 MG/ML
INJECTION, SOLUTION INFILTRATION; PERINEURAL PRN
Status: DISCONTINUED | OUTPATIENT
Start: 2025-01-29 | End: 2025-01-29

## 2025-01-29 RX ORDER — BUPIVACAINE HYDROCHLORIDE AND EPINEPHRINE 2.5; 5 MG/ML; UG/ML
INJECTION, SOLUTION INFILTRATION; PERINEURAL PRN
Status: DISCONTINUED | OUTPATIENT
Start: 2025-01-29 | End: 2025-01-29 | Stop reason: HOSPADM

## 2025-01-29 RX ORDER — IBUPROFEN 200 MG
600 TABLET ORAL ONCE
Status: DISCONTINUED | OUTPATIENT
Start: 2025-01-29 | End: 2025-01-29 | Stop reason: HOSPADM

## 2025-01-29 RX ORDER — ONDANSETRON 2 MG/ML
INJECTION INTRAMUSCULAR; INTRAVENOUS PRN
Status: DISCONTINUED | OUTPATIENT
Start: 2025-01-29 | End: 2025-01-29

## 2025-01-29 RX ORDER — ACETAMINOPHEN 325 MG/1
975 TABLET ORAL ONCE
Status: DISCONTINUED | OUTPATIENT
Start: 2025-01-29 | End: 2025-01-29 | Stop reason: HOSPADM

## 2025-01-29 RX ORDER — ALBUTEROL SULFATE 90 UG/1
INHALANT RESPIRATORY (INHALATION) PRN
Status: DISCONTINUED | OUTPATIENT
Start: 2025-01-29 | End: 2025-01-29

## 2025-01-29 RX ORDER — ONDANSETRON 2 MG/ML
4 INJECTION INTRAMUSCULAR; INTRAVENOUS ONCE
Status: COMPLETED | OUTPATIENT
Start: 2025-01-29 | End: 2025-01-29

## 2025-01-29 RX ORDER — ONDANSETRON 4 MG/1
4 TABLET, ORALLY DISINTEGRATING ORAL EVERY 30 MIN PRN
Status: DISCONTINUED | OUTPATIENT
Start: 2025-01-29 | End: 2025-01-29 | Stop reason: HOSPADM

## 2025-01-29 RX ORDER — OXYCODONE HYDROCHLORIDE 5 MG/1
5 TABLET ORAL EVERY 6 HOURS PRN
Qty: 10 TABLET | Refills: 0 | Status: SHIPPED | OUTPATIENT
Start: 2025-01-29

## 2025-01-29 RX ORDER — HYDROMORPHONE HYDROCHLORIDE 1 MG/ML
0.5 INJECTION, SOLUTION INTRAMUSCULAR; INTRAVENOUS; SUBCUTANEOUS ONCE
Status: COMPLETED | OUTPATIENT
Start: 2025-01-29 | End: 2025-01-29

## 2025-01-29 RX ORDER — DEXAMETHASONE SODIUM PHOSPHATE 4 MG/ML
INJECTION, SOLUTION INTRA-ARTICULAR; INTRALESIONAL; INTRAMUSCULAR; INTRAVENOUS; SOFT TISSUE PRN
Status: DISCONTINUED | OUTPATIENT
Start: 2025-01-29 | End: 2025-01-29

## 2025-01-29 RX ADMIN — PROPOFOL 30 MG: 10 INJECTION, EMULSION INTRAVENOUS at 13:07

## 2025-01-29 RX ADMIN — SODIUM CHLORIDE: 0.9 INJECTION, SOLUTION INTRAVENOUS at 12:56

## 2025-01-29 RX ADMIN — PROPOFOL 40 MG: 10 INJECTION, EMULSION INTRAVENOUS at 13:05

## 2025-01-29 RX ADMIN — ALBUTEROL SULFATE 2 PUFF: 90 AEROSOL, METERED RESPIRATORY (INHALATION) at 12:22

## 2025-01-29 RX ADMIN — KETOROLAC TROMETHAMINE 30 MG: 15 INJECTION, SOLUTION INTRAMUSCULAR; INTRAVENOUS at 15:10

## 2025-01-29 RX ADMIN — HYDROMORPHONE HYDROCHLORIDE 0.5 MG: 1 INJECTION, SOLUTION INTRAMUSCULAR; INTRAVENOUS; SUBCUTANEOUS at 09:38

## 2025-01-29 RX ADMIN — ALBUTEROL SULFATE 2 PUFF: 90 AEROSOL, METERED RESPIRATORY (INHALATION) at 12:26

## 2025-01-29 RX ADMIN — HYDROMORPHONE HYDROCHLORIDE 0.5 MG: 1 INJECTION, SOLUTION INTRAMUSCULAR; INTRAVENOUS; SUBCUTANEOUS at 13:06

## 2025-01-29 RX ADMIN — ROCURONIUM BROMIDE 10 MG: 50 INJECTION, SOLUTION INTRAVENOUS at 12:46

## 2025-01-29 RX ADMIN — DEXAMETHASONE SODIUM PHOSPHATE 8 MG: 4 INJECTION, SOLUTION INTRA-ARTICULAR; INTRALESIONAL; INTRAMUSCULAR; INTRAVENOUS; SOFT TISSUE at 12:21

## 2025-01-29 RX ADMIN — ONDANSETRON 4 MG: 2 INJECTION INTRAMUSCULAR; INTRAVENOUS at 12:21

## 2025-01-29 RX ADMIN — LIDOCAINE HYDROCHLORIDE 100 MG: 20 INJECTION, SOLUTION INFILTRATION; PERINEURAL at 12:11

## 2025-01-29 RX ADMIN — FENTANYL CITRATE 50 MCG: 50 INJECTION INTRAMUSCULAR; INTRAVENOUS at 12:23

## 2025-01-29 RX ADMIN — TRANEXAMIC ACID 1 G: 1 INJECTION, SOLUTION INTRAVENOUS at 12:45

## 2025-01-29 RX ADMIN — Medication 100 MG: at 12:13

## 2025-01-29 RX ADMIN — DEXMEDETOMIDINE HYDROCHLORIDE 10 MCG: 4 INJECTION, SOLUTION INTRAVENOUS at 12:27

## 2025-01-29 RX ADMIN — ALBUTEROL SULFATE 2 PUFF: 90 AEROSOL, METERED RESPIRATORY (INHALATION) at 12:19

## 2025-01-29 RX ADMIN — ROCURONIUM BROMIDE 30 MG: 50 INJECTION, SOLUTION INTRAVENOUS at 12:21

## 2025-01-29 RX ADMIN — PROPOFOL 200 MCG/KG/MIN: 10 INJECTION, EMULSION INTRAVENOUS at 12:13

## 2025-01-29 RX ADMIN — Medication 2 G: at 12:04

## 2025-01-29 RX ADMIN — Medication 200 MG: at 13:02

## 2025-01-29 RX ADMIN — SODIUM CHLORIDE: 0.9 INJECTION, SOLUTION INTRAVENOUS at 11:50

## 2025-01-29 RX ADMIN — FENTANYL CITRATE 50 MCG: 50 INJECTION INTRAMUSCULAR; INTRAVENOUS at 12:08

## 2025-01-29 RX ADMIN — PROPOFOL 200 MG: 10 INJECTION, EMULSION INTRAVENOUS at 12:13

## 2025-01-29 RX ADMIN — SODIUM CHLORIDE 1000 ML: 9 INJECTION, SOLUTION INTRAVENOUS at 11:09

## 2025-01-29 RX ADMIN — MIDAZOLAM 2 MG: 1 INJECTION INTRAMUSCULAR; INTRAVENOUS at 12:04

## 2025-01-29 RX ADMIN — ONDANSETRON 4 MG: 2 INJECTION, SOLUTION INTRAMUSCULAR; INTRAVENOUS at 07:40

## 2025-01-29 ASSESSMENT — ACTIVITIES OF DAILY LIVING (ADL)
ADLS_ACUITY_SCORE: 44

## 2025-01-29 ASSESSMENT — ENCOUNTER SYMPTOMS
CHILLS: 0
SORE THROAT: 0
LIGHT-HEADEDNESS: 0
COLOR CHANGE: 0
SHORTNESS OF BREATH: 0
WEAKNESS: 0
NAUSEA: 1
ADENOPATHY: 0
AGITATION: 0
ABDOMINAL PAIN: 1
CONFUSION: 0
HEADACHES: 0
FREQUENCY: 0
MYALGIAS: 0
DYSURIA: 0
EYE DISCHARGE: 0
DIARRHEA: 0
VOMITING: 1
COUGH: 0
FEVER: 0

## 2025-01-29 ASSESSMENT — COLUMBIA-SUICIDE SEVERITY RATING SCALE - C-SSRS
2. HAVE YOU ACTUALLY HAD ANY THOUGHTS OF KILLING YOURSELF IN THE PAST MONTH?: NO
6. HAVE YOU EVER DONE ANYTHING, STARTED TO DO ANYTHING, OR PREPARED TO DO ANYTHING TO END YOUR LIFE?: NO
1. IN THE PAST MONTH, HAVE YOU WISHED YOU WERE DEAD OR WISHED YOU COULD GO TO SLEEP AND NOT WAKE UP?: NO

## 2025-01-29 ASSESSMENT — LIFESTYLE VARIABLES: TOBACCO_USE: 1

## 2025-01-29 NOTE — DISCHARGE INSTRUCTIONS
Same Day Surgery Discharge Instructions  Special Precautions After Surgery - Adult    It is not unusual to feel lightheaded or faint, up to 24 hours after surgery or while taking pain medication.  If you have these symptoms; sit for a few minutes before standing and have someone assist you when getting up.  You should rest and relax for the next 24 hours and must have someone stay with you for at least 24 hours after your discharge.  DO NOT DRIVE any vehicle or operate mechanical equipment for 24 hours following the end of your surgery.  DO NOT DRIVE while taking narcotic pain medications that have been prescribed by your physician.  If you had a limb operated on, you must be able to use it fully to drive.  DO NOT drink alcoholic beverages for 24 hours following surgery or while taking prescription pain medication.  Drink clear liquids (apple juice, ginger ale, broth, 7-Up, etc.).  Progress to your regular diet as you feel able.  Any questions call your physician and do not make important decisions for 24 hours.    Nausea and Vomiting: Nausea and vomiting can occur any time after receiving anesthesia. If you experience nausea and vomiting we encourage you to move to a clear liquid diet and advance your diet as tolerated. If nausea and vomiting do not improve within 12 hours please call the surgeon or present to the Emergency department.     Break-through Bleeding: If your experience bleeding from your surgical site apply pressure and additional dressing per nurse instruction. For simple problems such as a saturated dressing, you may need to reinforce the dressing with more gauze and tape and put slight pressure on the site. If bleeding does not subside contact the surgeon or present to the Emergency Department.    Post-op Infection: If you develop a fever of 100.4 or greater, have pus like drainage, redness, swelling or severe pain at the surgical site not alleviated with pain medications; please  contact the surgeon or present to the Emergency Department.     Medications:  Acetaminophen (Tylenol):  Next dose: Start anytime.  Ibuprofen (Motrin, Advil):  Next dose: Start anytime.  Oxycodone:  Next dose: Start anytime.  Follow the instructions on the bottle.  __________________________________________________________________________________________________________________________________  IMPORTANT NUMBERS:    [facility] [place]:  [phone], [phone]  Pharmacy:  [phone]  Same Day Surgery:  [phone], for general post-op questions call Monday - Thursday until 8:30 p.m., Fridays until 6:00 p.m.   Mental Health Mobile Crisis line: [phone]                                                                      [facility], podiatry:  [phone]  [facility]:  [phone]     OB Clinic:  [phone]   General Surgery:  [phone]  Urology: [phone]  Specialty Access Center: [phone]

## 2025-01-29 NOTE — ANESTHESIA CARE TRANSFER NOTE
Patient: Franchesca Bowen    Procedure: Procedure(s):  LAPAROSCOPY, removal of ectopic pregnancy, right salpingectomy,       Diagnosis: Ectopic pregnancy, unspecified location, unspecified whether intrauterine pregnancy present [O00.90]  Diagnosis Additional Information: No value filed.    Anesthesia Type:   General     Note:    Oropharynx: oropharynx clear of all foreign objects  Level of Consciousness: drowsy  Oxygen Supplementation: face mask  Level of Supplemental Oxygen (L/min / FiO2): 6  Independent Airway: airway patency satisfactory and stable  Dentition: dentition unchanged  Vital Signs Stable: post-procedure vital signs reviewed and stable  Report to RN Given: handoff report given  Patient transferred to: PACU    Handoff Report: Identifed the Patient, Identified the Reponsible Provider, Reviewed the pertinent medical history, Discussed the surgical course, Reviewed Intra-OP anesthesia mangement and issues during anesthesia, Set expectations for post-procedure period and Allowed opportunity for questions and acknowledgement of understanding    Vitals:  Vitals Value Taken Time   /69 01/29/25 1330   Temp 36.6  C (97.8  F) 01/29/25 1322   Pulse 93 01/29/25 1334   Resp 75 01/29/25 1334   SpO2 99 % 01/29/25 1334   Vitals shown include unfiled device data.    Electronically Signed By: NEFTALI Nicole CRNA  January 29, 2025  1:35 PM

## 2025-01-29 NOTE — ANESTHESIA PREPROCEDURE EVALUATION
Anesthesia Pre-Procedure Evaluation    Patient: Franchesca Bowen   MRN: [id number] : 1990        Procedure : Procedure(s):  LAPAROSCOPY, removal of ectopic pregnancy, possible unilateral salpingectomy, possible unilateral oophorectomy          Past Medical History:   Diagnosis Date    Chickenpox       Past Surgical History:   Procedure Laterality Date     SECTION N/A 3/24/2023    Procedure:  SECTION;  Surgeon: Naomi Moore MD;  Location: [facility] OR    DILATION AND CURETTAGE SUCTION N/A 10/18/2024    Procedure: DILATION AND CURETTAGE, UTERUS, USING SUCTION;  Surgeon: Luzmaria Winston MD;  Location: [facility] OR    MYRINGOTOMY, INSERT TUBE BILATERAL, COMBINED      [place] REMOVE TONSILS/ADENOIDS,<11 Y/O      age 9      No Known Allergies   Social History     Tobacco Use    Smoking status: Former     Current packs/day: 0.00     Average packs/day: 0.5 packs/day for 7.0 years (3.5 ttl pk-yrs)     Types: Cigarettes     Start date: 2010     Quit date: 2017     Years since quittin.7     Passive exposure: Past    Smokeless tobacco: Never   Substance Use Topics    Alcohol use: Not Currently      Wt Readings from Last 1 Encounters:   25 86.2 kg (190 lb)        Anesthesia Evaluation   Pt has had prior anesthetic. Type: Regional and General.        ROS/MED HX  ENT/Pulmonary:     (+)                tobacco use, Past use,                       Neurologic:  - neg neurologic ROS     Cardiovascular:  - neg cardiovascular ROS     METS/Exercise Tolerance:     Hematologic:  - neg hematologic  ROS     Musculoskeletal:  - neg musculoskeletal ROS     GI/Hepatic:  - neg GI/hepatic ROS     Renal/Genitourinary:  - neg Renal ROS     Endo:     (+)               Obesity,       Psychiatric/Substance Use:  - neg psychiatric ROS     Infectious Disease:  - neg infectious disease ROS     Malignancy:  - neg malignancy ROS     Other: Comment: Ectopic pregnancy     (+) Possibly pregnant, , ,          Physical Exam    Airway  airway exam normal      Mallampati: II   TM distance: > 3 FB   Neck ROM: full   Mouth opening: > 3 cm    Respiratory Devices and Support         Dental       (+) Completely normal teeth      Cardiovascular   cardiovascular exam normal       Rhythm and rate: regular and normal     Pulmonary   pulmonary exam normal        breath sounds clear to auscultation           OUTSIDE LABS:  CBC:   Lab Results   Component Value Date    WBC 12.4 (H) 01/29/2025    WBC 8.4 10/16/2024    HGB 11.3 (L) 01/29/2025    HGB 13.5 10/16/2024    HCT 33.7 (L) 01/29/2025    HCT 40.3 10/16/2024     01/29/2025     10/16/2024     BMP:   Lab Results   Component Value Date     01/29/2025     06/26/2024    POTASSIUM 4.2 01/29/2025    POTASSIUM 3.7 06/26/2024    CHLORIDE 101 01/29/2025    CHLORIDE 103 06/26/2024    CO2 19 (L) 01/29/2025    CO2 20 (L) 06/26/2024    BUN 8.7 01/29/2025    BUN 7.9 06/26/2024    CR 0.76 01/29/2025    CR 0.78 06/26/2024     (H) 01/29/2025    GLC 93 06/26/2024     COAGS:   Lab Results   Component Value Date    INR 0.99 10/16/2024     POC:   Lab Results   Component Value Date    HCG Negative 02/05/2024    HCGS Negative 06/26/2024     HEPATIC:   Lab Results   Component Value Date    ALBUMIN 4.1 01/29/2025    PROTTOTAL 6.9 01/29/2025    ALT 19 01/29/2025    AST 18 01/29/2025    ALKPHOS 49 01/29/2025    BILITOTAL 0.4 01/29/2025     OTHER:   Lab Results   Component Value Date    LACT 2.9 (H) 02/15/2022    A1C 5.5 10/02/2024    FERNY 8.7 (L) 01/29/2025    LIPASE 20 01/29/2025    TSH 2.60 10/16/2024    CRP 5.3 02/15/2022       Anesthesia Plan    ASA Status:  2, emergent    NPO Status:  NPO Appropriate    Anesthesia Type: General.     - Airway: ETT   Induction: RSI, Propofol.   Maintenance: TIVA.        Consents    Anesthesia Plan(s) and associated risks, benefits, and realistic alternatives discussed. Questions answered and patient/representative(s) expressed  understanding.     - Discussed: Risks, Benefits and Alternatives for BOTH SEDATION and the PROCEDURE were discussed     - Discussed with:  Patient      - Extended Intubation/Ventilatory Support Discussed: No.      - Patient is DNR/DNI Status: No     Use of blood products discussed: Yes.     - Discussed with: Patient.     Postoperative Care    Pain management: IV analgesics, Oral pain medications.   PONV prophylaxis: Ondansetron (or other 5HT-3), Dexamethasone or Solumedrol, Background Propofol Infusion     Comments:               NEFTALI Nicole CRNA    I have reviewed the pertinent notes and labs in the chart from the past 30 days and (re)examined the patient.  Any updates or changes from those notes are reflected in this note.    Clinically Significant Risk Factors Present on Admission

## 2025-01-29 NOTE — ANESTHESIA POSTPROCEDURE EVALUATION
Patient: Franchesca Bowen    Procedure: Procedure(s):  LAPAROSCOPY, removal of ectopic pregnancy, right salpingectomy,       Anesthesia Type:  General    Note:  Disposition: Outpatient   Postop Pain Control: Uneventful            Sign Out: Well controlled pain   PONV: No   Neuro/Psych: Uneventful            Sign Out: Acceptable/Baseline neuro status   Airway/Respiratory: Uneventful            Sign Out: Acceptable/Baseline resp. status   CV/Hemodynamics: Uneventful            Sign Out: Acceptable CV status; No obvious hypovolemia; No obvious fluid overload   Other NRE: NONE   DID A NON-ROUTINE EVENT OCCUR? No           Last vitals:  Vitals Value Taken Time   /73 01/29/25 1411   Temp 36.6  C (97.8  F) 01/29/25 1411   Pulse 68 01/29/25 1414   Resp 19 01/29/25 1414   SpO2 94 % 01/29/25 1414   Vitals shown include unfiled device data.    Electronically Signed By: NEFTALI Reid CRNA  January 29, 2025  4:22 PM

## 2025-01-29 NOTE — ANESTHESIA PROCEDURE NOTES
Airway       Patient location during procedure: OR       Procedure Start/Stop Times: 1/29/2025 12:14 PM and 1/29/2025 12:14 PM  Staff -        CRNA: Ambrose Escalona APRN CRNA       Performed By: CRNA  Consent for Airway        Urgency: elective  Indications and Patient Condition       Indications for airway management: bo-procedural       Induction type:RSI       Mask difficulty assessment: 0 - not attempted    Final Airway Details       Final airway type: endotracheal airway       Successful airway: ETT - single  Endotracheal Airway Details        ETT size (mm): 7.0       Cuffed: yes       Cuff volume (mL): 6       Successful intubation technique: direct laryngoscopy       DL Blade Type: Pereira 2       Grade View of Cords: 1       Adjucts: stylet       Position: Left       Measured from: gums/teeth       Secured at (cm): 21       Bite block used: None    Post intubation assessment        Placement verified by: capnometry, equal breath sounds and chest rise        Number of attempts at approach: 1       Number of other approaches attempted: 0       Secured with: tape       Ease of procedure: easy       Dentition: Intact and Unchanged    Medication(s) Administered   Medication Administration Time: 1/29/2025 12:14 PM

## 2025-01-29 NOTE — ED PROVIDER NOTES
History     Chief Complaint   Patient presents with    Abdominal Pain     C/o right upper abdominal pain - vomiting     HPI  Franchesca Bowen is a 34 year old female who is about 6-1/2 weeks pregnant who presents with acute onset of initially lower abdominal pain which has now gone into the right upper quadrant with some radiation to the right scapula which started about 9 PM last evening.  She has had nausea with vomiting x 4.  She denies vaginal bleeding.  She has no dysuria or urinary frequency.  She has no melena or bright red blood per rectum.  Past surgical history is a D&C and a .  She has no past similar complaints.    Allergies:  No Known Allergies    Problem List:    Patient Active Problem List    Diagnosis Date Noted    Recurrent pregnancy loss 10/16/2024     Priority: Medium    Anemia due to blood loss, acute 2023     Priority: Medium    Class 2 obesity without serious comorbidity in adult 2023     Priority: Medium        Past Medical History:    Past Medical History:   Diagnosis Date    Chickenpox        Past Surgical History:    Past Surgical History:   Procedure Laterality Date     SECTION N/A 3/24/2023    Procedure:  SECTION;  Surgeon: Naomi Moore MD;  Location: [facility] OR    DILATION AND CURETTAGE SUCTION N/A 10/18/2024    Procedure: DILATION AND CURETTAGE, UTERUS, USING SUCTION;  Surgeon: Luzmaria Winston MD;  Location: [facility] OR    MYRINGOTOMY, INSERT TUBE BILATERAL, COMBINED      [place] REMOVE TONSILS/ADENOIDS,<13 Y/O      age 9       Family History:    Family History   Problem Relation Age of Onset    No Known Problems Mother     Crohn's Disease Father     No Known Problems Maternal Grandmother     No Known Problems Maternal Grandfather     No Known Problems Paternal Grandmother     No Known Problems Paternal Grandfather     No Known Problems Brother     No Known Problems Son        Social History:  Marital Status:   [2]  Social History  "    Tobacco Use    Smoking status: Former     Current packs/day: 0.00     Average packs/day: 0.5 packs/day for 7.0 years (3.5 ttl pk-yrs)     Types: Cigarettes     Start date: 2010     Quit date: 2017     Years since quittin.7     Passive exposure: Past    Smokeless tobacco: Never   Vaping Use    Vaping status: Never Used   Substance Use Topics    Alcohol use: Not Currently    Drug use: Never        Medications:    acetaminophen (TYLENOL) 325 MG tablet  famotidine (PEPCID) 20 MG tablet  fluticasone (FLONASE) 50 MCG/ACT nasal spray  ibuprofen (ADVIL/MOTRIN) 800 MG tablet  levothyroxine (SYNTHROID/LEVOTHROID) 25 MCG tablet  ondansetron (ZOFRAN ODT) 4 MG ODT tab  oxyCODONE (ROXICODONE) 5 MG tablet  oxyCODONE-acetaminophen (PERCOCET) 5-325 MG tablet  Prenatal Vit-Fe Fumarate-FA (PRENATAL MULTIVITAMIN  PLUS IRON) 27-1 MG TABS          Review of Systems   Constitutional:  Negative for chills and fever.   HENT:  Negative for congestion and sore throat.    Eyes:  Negative for discharge.   Respiratory:  Negative for cough and shortness of breath.    Cardiovascular:  Negative for chest pain and leg swelling.   Gastrointestinal:  Positive for abdominal pain, nausea and vomiting. Negative for diarrhea.   Genitourinary:  Negative for dysuria and frequency.   Musculoskeletal:  Negative for myalgias.   Skin:  Negative for color change and rash.   Neurological:  Negative for weakness, light-headedness and headaches.   Hematological:  Negative for adenopathy.   Psychiatric/Behavioral:  Negative for agitation, behavioral problems and confusion.        Physical Exam   BP: 118/87  Pulse: 82  Temp: 98.2  F (36.8  C)  Resp: 22  Height: 149.9 cm (4' 11\")  Weight: 86.2 kg (190 lb)      Physical Exam  Constitutional:       General: She is not in acute distress.     Appearance: She is well-developed.   HENT:      Head: Normocephalic and atraumatic.   Eyes:      Conjunctiva/sclera: Conjunctivae normal.      Pupils: Pupils are " equal, round, and reactive to light.   Neck:      Thyroid: No thyromegaly.      Trachea: No tracheal deviation.   Cardiovascular:      Rate and Rhythm: Normal rate and regular rhythm.      Heart sounds: Normal heart sounds. No murmur heard.  Pulmonary:      Effort: Pulmonary effort is normal. No respiratory distress.      Breath sounds: Normal breath sounds. No wheezing.   Chest:      Chest wall: No tenderness.   Abdominal:      General: There is no distension.      Palpations: Abdomen is soft.      Tenderness: There is generalized abdominal tenderness.   Musculoskeletal:         General: No tenderness.      Cervical back: Normal range of motion and neck supple.   Skin:     General: Skin is warm.      Findings: No rash.   Neurological:      Mental Status: She is alert and oriented to person, place, and time.      Sensory: No sensory deficit.   Psychiatric:         Behavior: Behavior normal.         ED Course     ED Course as of 25   1056 Spoke to Dr. Regalado at 10:50 AM in regard to ruptured ectopic pregnancy.  Plan is to go to the operating room after Dr. Regalado completes a .     Procedures                Results for orders placed or performed during the hospital encounter of 25 (from the past 24 hours)   CBC with platelets, differential    Narrative    The following orders were created for panel order CBC with platelets, differential.  Procedure                               Abnormality         Status                     ---------                               -----------         ------                     CBC with platelets and d...[[id number]]  Abnormal            Final result                 Please view results for these tests on the individual orders.   Comprehensive metabolic panel   Result Value Ref Range    Sodium 136 135 - 145 mmol/L    Potassium 4.2 3.4 - 5.3 mmol/L    Carbon Dioxide (CO2) 19 (L) 22 - 29 mmol/L    Anion Gap 16 (H) 7 - 15 mmol/L    Urea Nitrogen 8.7  6.0 - 20.0 mg/dL    Creatinine 0.76 0.51 - 0.95 mg/dL    GFR Estimate >90 >60 mL/min/1.73m2    Calcium 8.7 (L) 8.8 - 10.4 mg/dL    Chloride 101 98 - 107 mmol/L    Glucose 167 (H) 70 - 99 mg/dL    Alkaline Phosphatase 49 40 - 150 U/L    AST 18 0 - 45 U/L    ALT 19 0 - 50 U/L    Protein Total 6.9 6.4 - 8.3 g/dL    Albumin 4.1 3.5 - 5.2 g/dL    Bilirubin Total 0.4 <=1.2 mg/dL   Lipase   Result Value Ref Range    Lipase 20 13 - 60 U/L   CBC with platelets and differential   Result Value Ref Range    WBC Count 12.4 (H) 4.0 - 11.0 10e3/uL    RBC Count 3.67 (L) 3.80 - 5.20 10e6/uL    Hemoglobin 11.3 (L) 11.7 - 15.7 g/dL    Hematocrit 33.7 (L) 35.0 - 47.0 %    MCV 92 78 - 100 fL    MCH 30.8 26.5 - 33.0 pg    MCHC 33.5 31.5 - 36.5 g/dL    RDW 13.0 10.0 - 15.0 %    Platelet Count 305 150 - 450 10e3/uL    % Neutrophils 89 %    % Lymphocytes 8 %    % Monocytes 3 %    % Eosinophils 0 %    % Basophils 0 %    % Immature Granulocytes 0 %    NRBCs per 100 WBC 0 <1 /100    Absolute Neutrophils 11.0 (H) 1.6 - 8.3 10e3/uL    Absolute Lymphocytes 0.9 0.8 - 5.3 10e3/uL    Absolute Monocytes 0.3 0.0 - 1.3 10e3/uL    Absolute Eosinophils 0.0 0.0 - 0.7 10e3/uL    Absolute Basophils 0.0 0.0 - 0.2 10e3/uL    Absolute Immature Granulocytes 0.1 <=0.4 10e3/uL    Absolute NRBCs 0.0 10e3/uL   HCG quantitative pregnancy (blood)   Result Value Ref Range    hCG Quantitative 3,470 (H) <5 mIU/mL   US Abdomen Limited    Narrative    EXAM: ULTRASOUND ABDOMEN LIMITED  LOCATION: [facility]  DATE: 01/29/2025    INDICATION: Right upper quadrant abdominal pain.  COMPARISON: None.  TECHNIQUE: Limited abdominal ultrasound.    FINDINGS:    GALLBLADDER: Normal. No gallstones, wall thickening, or pericholecystic fluid. Negative sonographic Velasquez's sign.    BILE DUCTS: No biliary dilatation. The common duct measures 3 mm.    LIVER: Increased echogenicity from diffuse fatty infiltration. No focal lesion. Liver is 14.7 cm.    RIGHT  KIDNEY: No hydronephrosis.    PANCREAS: The pancreas is largely obscured by overlying gas.    Trace ascites.      Impression    IMPRESSION:  1.  No gallstones. Negative sonographic Velasquez's sign. No biliary ductal dilatation.  2.  Trace ascites.  3.  Hepatomegaly and fatty liver.     OB < 14 weeks single or first gestation US    Narrative    EXAM: ULTRASOUND OBSTETRIC < 14 WEEKS SINGLE-TRANSABDOMINAL  LOCATION: [facility]  DATE: 01/29/2025    INDICATION: Pregnancy with abdominal pain.  COMPARISON: Obstetric ultrasound 10/16/2024.  TECHNIQUE: Transabdominal scans were performed. Endovaginal ultrasound was performed to better visualize the embryo.    FINDINGS:  UTERUS: No evidence of an intrauterine gestational sac, yolk sac or fetal pole. Endometrial thickness measures 1.7 cm.    RIGHT OVARY: Measures 2.7 x 2.6 x 2.7 cm. Heterogeneous masslike structure measuring 5.5 x 5.8 x 4.9 cm in the right adnexa appearing adjacent to the right ovary.    LEFT OVARY: Not visualized and likely obscured by bowel gas.    Small-volume heterogeneous fluid within the cul-de-sac.      Impression    IMPRESSION:   1.  Overall findings concerning for right adnexal ectopic (5.5 cm) with small amount of complex pelvic fluid that could reflect blood products/rupture.  2.  No evidence for an intrauterine pregnancy.           Medications   ondansetron (ZOFRAN) injection 4 mg (4 mg Intravenous $Given 1/29/25 0740)   HYDROmorphone (PF) (DILAUDID) injection 0.5 mg (0.5 mg Intravenous $Given 1/29/25 0938)       Assessments & Plan (with Medical Decision Making)     I have reviewed the nursing notes.    I have reviewed the findings, diagnosis, plan and need for follow up with the patient.      34 year old female who is about 6-1/2 weeks pregnant who presents with acute onset of initially lower abdominal pain which has now gone into the right upper quadrant with some radiation to the right scapula which started about 9 PM  last evening.  She has had nausea with vomiting x 4.  She denies vaginal bleeding.  She has no dysuria or urinary frequency.  She has no melena or bright red blood per rectum.  Past surgical history is a D&C and a .  She has no past similar complaints.    On exam, patient's blood pressure is 118/87 with a pulse rate of 82 and temperature of 98.2.  Respirations are 22 with O2 saturations of 96%.  She has fairly significant generalized abdominal tenderness.    Lab work was obtained.  A right upper quadrant ultrasound was ultimately obtained to evaluate for gallbladder, liver, and pancreatic pathology.  Patient was given Dilaudid for pain and Zofran for nausea.  Urinalysis was ordered.    Patient CBC returned with a white count of 12.4 and a hemoglobin of 11.3.  3 months ago, her hemoglobin was 13.5.    Comprehensive metabolic profile returned with a bicarb of 19 with an increased anion gap of 16.  She has had a low bicarb in the past and the 17-20 range in 2 years.  Lipase was 20.    Quantitative beta-hCG returned at 3470.    A right upper quadrant ultrasound was obtained and independently read by me.  There is no evidence of stones.  Formal read showed no biliary dilatation and did show some diffuse fatty infiltration of the liver.    Patient has never had a OB ultrasound for this pregnancy.  I am concerned about potential ruptured ectopic pregnancy with her diffuse abdominal pain and right scapular pain.  A pelvic ultrasound will be performed.    Ultrasound findings were concerning for right adnexal ectopic pregnancy with a small amount of complex fluid and no evidence of intrauterine pregnancy.      I discussed the case with Dr. Regalado from OB/GYN.  Plan will be to take the patient to the operating room for ectopic pregnancy removal.    Her blood pressure remained relatively normal with the lowest being 106/70.  Her pulse rate was normal at 77 at the time of leaving the department.        Medical Decision  Making  The patient's presentation was of high complexity (an acute health issue posing potential threat to life or bodily function).    The patient's evaluation involved:  review of 3+ test result(s) ordered prior to this encounter (see separate area of note for details)  ordering and/or review of 3+ test(s) in this encounter (see separate area of note for details)  independent interpretation of testing performed by another health professional (see separate area of note for details)  discussion of management or test interpretation with another health professional (see separate area of note for details)    The patient's management necessitated high risk (a decision regarding hospitalization).        New Prescriptions    No medications on file       Final diagnoses:   Ectopic pregnancy of right ovary       1/29/2025   [facility] EMERGENCY DEPT       [facility]canCo[facility] Springs HospitalEarle bhagat MD  01/30/25 0901

## 2025-01-29 NOTE — CONSULTS
"Gynecology Consult Note  Name: Franchesca Bowen   : 1990  MRN: [id number]    Consulting Provider: Earle Garcia MD   Reason for Consult: Ruptured ectopic pregnancy  Consult Date: 25    Assessment/Plan:   Franchesca Bowen, 34 year old  pregnant patient, presented for uncontrolled abdominal pelvic pain. Pelvic ultrasound was concerning for right sided ruptured ectopic pregnancy. Recommended \"laparoscopy, removal of ectopic pregnancy, possible unilateral salpingectomy, possible unilateral oophorectomy\". Discussed surgical risks. Proceed to OR in STAT fashion.    Sabine Regalado MD  Obstetrics and Gynecology  [facility]   2025     ------------------------------------------------------------------------------------------  HPI: RUQ and then RLQ pain started last night. Uncontrolled pain. Nausea.    OBHx: . CS x1. X2 spontaneous miscarriage. X1 miscarriage managed with sD&C. CS x1    PMH: recurrent pregnancy loss, hypothyroidism  PSH: CS x1, sD&C, denied other abdominal surgeries.  Meds: levothyroxine 25mcg, prenatal vitamin  No Known Allergies    Objective:   /70   Pulse 77   Temp 98.2  F (36.8  C) (Oral)   Resp 22   Ht 1.499 m (4' 11\")   Wt 86.2 kg (190 lb)   LMP 12/15/2024 (Exact Date)   SpO2 96%   BMI 38.38 kg/m    Constitutional: pale, appears to be in pain  Gastrointestinal: generalized tenderness    Labs/Imaging:  Results for orders placed or performed during the hospital encounter of 25   US Abdomen Limited     Status: None    Narrative    EXAM: ULTRASOUND ABDOMEN LIMITED  LOCATION: [facility]  DATE: 2025    INDICATION: Right upper quadrant abdominal pain.  COMPARISON: None.  TECHNIQUE: Limited abdominal ultrasound.    FINDINGS:    GALLBLADDER: Normal. No gallstones, wall thickening, or pericholecystic fluid. Negative sonographic Velasquez's sign.    BILE DUCTS: No biliary dilatation. The common duct " measures 3 mm.    LIVER: Increased echogenicity from diffuse fatty infiltration. No focal lesion. Liver is 14.7 cm.    RIGHT KIDNEY: No hydronephrosis.    PANCREAS: The pancreas is largely obscured by overlying gas.    Trace ascites.      Impression    IMPRESSION:  1.  No gallstones. Negative sonographic Velasquez's sign. No biliary ductal dilatation.  2.  Trace ascites.  3.  Hepatomegaly and fatty liver.     OB < 14 weeks single or first gestation US     Status: None    Narrative    EXAM: ULTRASOUND OBSTETRIC < 14 WEEKS SINGLE-TRANSABDOMINAL  LOCATION: [facility]  DATE: 01/29/2025    INDICATION: Pregnancy with abdominal pain.  COMPARISON: Obstetric ultrasound 10/16/2024.  TECHNIQUE: Transabdominal scans were performed. Endovaginal ultrasound was performed to better visualize the embryo.    FINDINGS:  UTERUS: No evidence of an intrauterine gestational sac, yolk sac or fetal pole. Endometrial thickness measures 1.7 cm.    RIGHT OVARY: Measures 2.7 x 2.6 x 2.7 cm. Heterogeneous masslike structure measuring 5.5 x 5.8 x 4.9 cm in the right adnexa appearing adjacent to the right ovary.    LEFT OVARY: Not visualized and likely obscured by bowel gas.    Small-volume heterogeneous fluid within the cul-de-sac.      Impression    IMPRESSION:   1.  Overall findings concerning for right adnexal ectopic (5.5 cm) with small amount of complex pelvic fluid that could reflect blood products/rupture.  2.  No evidence for an intrauterine pregnancy.       Comprehensive metabolic panel     Status: Abnormal   Result Value Ref Range    Sodium 136 135 - 145 mmol/L    Potassium 4.2 3.4 - 5.3 mmol/L    Carbon Dioxide (CO2) 19 (L) 22 - 29 mmol/L    Anion Gap 16 (H) 7 - 15 mmol/L    Urea Nitrogen 8.7 6.0 - 20.0 mg/dL    Creatinine 0.76 0.51 - 0.95 mg/dL    GFR Estimate >90 >60 mL/min/1.73m2    Calcium 8.7 (L) 8.8 - 10.4 mg/dL    Chloride 101 98 - 107 mmol/L    Glucose 167 (H) 70 - 99 mg/dL    Alkaline Phosphatase 49 40  - 150 U/L    AST 18 0 - 45 U/L    ALT 19 0 - 50 U/L    Protein Total 6.9 6.4 - 8.3 g/dL    Albumin 4.1 3.5 - 5.2 g/dL    Bilirubin Total 0.4 <=1.2 mg/dL   Lipase     Status: Normal   Result Value Ref Range    Lipase 20 13 - 60 U/L   [place] Draw     Status: None    Narrative    The following orders were created for panel order [place] Draw.  Procedure                               Abnormality         Status                     ---------                               -----------         ------                     Extra Red Top Tube[[id number]]                               Final result                 Please view results for these tests on the individual orders.   CBC with platelets and differential     Status: Abnormal   Result Value Ref Range    WBC Count 12.4 (H) 4.0 - 11.0 10e3/uL    RBC Count 3.67 (L) 3.80 - 5.20 10e6/uL    Hemoglobin 11.3 (L) 11.7 - 15.7 g/dL    Hematocrit 33.7 (L) 35.0 - 47.0 %    MCV 92 78 - 100 fL    MCH 30.8 26.5 - 33.0 pg    MCHC 33.5 31.5 - 36.5 g/dL    RDW 13.0 10.0 - 15.0 %    Platelet Count 305 150 - 450 10e3/uL    % Neutrophils 89 %    % Lymphocytes 8 %    % Monocytes 3 %    % Eosinophils 0 %    % Basophils 0 %    % Immature Granulocytes 0 %    NRBCs per 100 WBC 0 <1 /100    Absolute Neutrophils 11.0 (H) 1.6 - 8.3 10e3/uL    Absolute Lymphocytes 0.9 0.8 - 5.3 10e3/uL    Absolute Monocytes 0.3 0.0 - 1.3 10e3/uL    Absolute Eosinophils 0.0 0.0 - 0.7 10e3/uL    Absolute Basophils 0.0 0.0 - 0.2 10e3/uL    Absolute Immature Granulocytes 0.1 <=0.4 10e3/uL    Absolute NRBCs 0.0 10e3/uL   Extra Red Top Tube     Status: None   Result Value Ref Range    Hold Specimen [facility]    HCG quantitative pregnancy (blood)     Status: Abnormal   Result Value Ref Range    hCG Quantitative 3,470 (H) <5 mIU/mL   CBC with platelets, differential     Status: Abnormal    Narrative    The following orders were created for panel order CBC with platelets, differential.  Procedure                                Abnormality         Status                     ---------                               -----------         ------                     CBC with platelets and d...[[id number]]  Abnormal            Final result                 Please view results for these tests on the individual orders.

## 2025-01-29 NOTE — OP NOTE
[facility] Full Operative Note    Name: Franchesca Bowen   MRN: [id number]   : 1990   Procedure date: 25     Pre-operative diagnosis: Suspected ruptured ectopic pregnancy  Post-operative diagnosis:   -S/p below procedure  -Ruptured right sided tubal ectopic pregnancy    Procedure: Laparoscopy, removal of ectopic pregnancy, right salpingectomy, evacuation of hemoperitoneum  Anesthesia: general endotracheal intubation with local anesthesia     Surgeon: Sabine Regalado MD  Assistant(s): MOY [place]. His assistance was required due to the need for additional skilled surgical hands to retract and hold instruments due to the nature of the surgical procedure and risk of complications.     Indications: Franchesca Bowen, 34 year old  pregnant patient, presented for uncontrolled abdominal pelvic pain. Pelvic ultrasound was concerning for ruptured  right sided ectopic pregnancy. Thus, above procedure was recommended.     Estimated blood loss: 5mL + 800mL of hemoperitoneum  Total IV fluids: 1300 mL, Lactated Ringer  Total urine output: 125mL, clear urine  Specimens:   ID Type Source Tests Collected by Time Destination   1 : right fallopian tube and ectopic pregnancy Tissue Fallopian Tube, Right SURGICAL PATHOLOGY EXAM Sabine Regalado MD 2025 12:55 PM      Findings:   -No injury upon trochar entry sites. Normal appearing liver, gallbladder, bowel, uterus, left fallopian tube and bilateral ovaries.  -About 1L of hemoperitoneum noted (~800mL were suctioned out). A piece of blood clot with tissues consistent with pregnancy noted floating in the pelvis. The right fallopian tube with ruptured ectopic pregnancy noted.       Procedure details: The patient was taken to the operating room where she was placed in the dorsal lithotomy position with feet in Yellow fin stirrups. General endotracheal anesthesia was administered. Patient safety time out was then performed.   An exam under anesthesia was  performed. The patient was then prepped and draped in the usual sterile fashion. A speculum was placed. A Skylar uterine manipulator was placed. A garcia catheter was placed.     Attention was then turned to the abdomen where 0.25% marcaine with epinephrine was used to infiltrate the inferior aspect of the umbilicus. A 5 mm vertical incision was made inferior to the umbilicus and a Gaston used to expand the incision. Laparoscopic abdominal access was obtained with a 5mm VisiPort through the umbilical incision under direct visualization. Abdominal insufflation was achieved with CO2 gas to 15mmHg. The upper abdomen was visualized and photos were taken.   Photos were taken. Two 5 mm port were placed in the LLQ quadrant under visualization: one at about 2cm superior and medial to the ASIS and the another one about 5cm superior to the first LLQ port site.     Large amount of hemoperitoneum was noted. These blood was suctioned out to the best of our ability. The right sided tubal ectopic pregnancy appeared bursted open without active bleeding anymore. Right salpingectomy with its attached ectopic pregnancy was performed with LigaSure by serial cauterization and ligation from the fimbriated end to the cornual end of the fallopian tube. The mesosalpingx was hemostatic.  The right fallopian tube with its ectopic pregnancy tissue was placed in a 5mm EndoCatch bag and removed. A large blood clot with pregnancy appearing tissue was also placed in a 5mm EndoCatch bag and removed. The ports were removed under direct visualization. The pneumoperitoneum was expelled. The skin incisions were closed using 4-0 Monocryl and Dermabound.  The uterine manipulator was removed. The garcia catheter was removed. Instrument, sponge, and needle counts were correct times 2.     Complications: None apparent   Condition: Patient taken to recovery in stable condition.    Sabine Regalado MD  Obstetrics and Gynecology  01/29/2025

## 2025-01-31 LAB
PATH REPORT.COMMENTS IMP SPEC: NORMAL
PATH REPORT.COMMENTS IMP SPEC: NORMAL
PATH REPORT.FINAL DX SPEC: NORMAL
PATH REPORT.GROSS SPEC: NORMAL
PATH REPORT.MICROSCOPIC SPEC OTHER STN: NORMAL
PATH REPORT.RELEVANT HX SPEC: NORMAL
PHOTO IMAGE: NORMAL

## 2025-03-08 ENCOUNTER — HEALTH MAINTENANCE LETTER (OUTPATIENT)
Age: 35
End: 2025-03-08

## 2025-08-04 ENCOUNTER — VIRTUAL VISIT (OUTPATIENT)
Dept: OBGYN | Facility: CLINIC | Age: 35
End: 2025-08-04

## 2025-08-04 VITALS — WEIGHT: 185 LBS | BODY MASS INDEX: 37.29 KG/M2 | HEIGHT: 59 IN

## 2025-08-04 DIAGNOSIS — Z34.80 PRENATAL CARE, SUBSEQUENT PREGNANCY, ANTEPARTUM: Primary | ICD-10-CM

## 2025-08-04 DIAGNOSIS — Z34.80 PRENATAL CARE, SUBSEQUENT PREGNANCY, UNSPECIFIED TRIMESTER: ICD-10-CM

## 2025-08-04 PROCEDURE — 99207 PR NO CHARGE NURSE ONLY: CPT | Mod: 93

## 2025-08-18 LAB
ABO + RH BLD: NORMAL
BLD GP AB SCN SERPL QL: NEGATIVE
SPECIMEN EXP DATE BLD: NORMAL

## 2025-08-19 ENCOUNTER — PRENATAL OFFICE VISIT (OUTPATIENT)
Dept: OBGYN | Facility: CLINIC | Age: 35
End: 2025-08-19

## 2025-08-19 VITALS
RESPIRATION RATE: 18 BRPM | DIASTOLIC BLOOD PRESSURE: 81 MMHG | SYSTOLIC BLOOD PRESSURE: 123 MMHG | BODY MASS INDEX: 38.3 KG/M2 | HEIGHT: 59 IN | TEMPERATURE: 98.9 F | WEIGHT: 190 LBS | HEART RATE: 69 BPM

## 2025-08-19 DIAGNOSIS — E66.812 CLASS 2 OBESITY WITHOUT SERIOUS COMORBIDITY WITH BODY MASS INDEX (BMI) OF 37.0 TO 37.9 IN ADULT, UNSPECIFIED OBESITY TYPE: ICD-10-CM

## 2025-08-19 DIAGNOSIS — O21.9 NAUSEA/VOMITING IN PREGNANCY: ICD-10-CM

## 2025-08-19 DIAGNOSIS — Z71.89 OTHER SPECIFIED COUNSELING: Primary | Chronic | ICD-10-CM

## 2025-08-19 DIAGNOSIS — Z11.3 SCREENING FOR STD (SEXUALLY TRANSMITTED DISEASE): ICD-10-CM

## 2025-08-19 DIAGNOSIS — Z59.71 DOES NOT HAVE HEALTH INSURANCE: ICD-10-CM

## 2025-08-19 DIAGNOSIS — D25.1 INTRAMURAL LEIOMYOMA OF UTERUS: ICD-10-CM

## 2025-08-19 DIAGNOSIS — N96 RECURRENT PREGNANCY LOSS: ICD-10-CM

## 2025-08-19 DIAGNOSIS — Z34.80 PRENATAL CARE, SUBSEQUENT PREGNANCY, ANTEPARTUM: Primary | ICD-10-CM

## 2025-08-19 PROBLEM — D62 ANEMIA DUE TO BLOOD LOSS, ACUTE: Status: RESOLVED | Noted: 2023-03-27 | Resolved: 2025-08-19

## 2025-08-19 LAB
ALBUMIN UR-MCNC: NEGATIVE MG/DL
APPEARANCE UR: CLEAR
BACTERIA #/AREA URNS HPF: ABNORMAL /HPF
BILIRUB UR QL STRIP: NEGATIVE
COLOR UR AUTO: YELLOW
ERYTHROCYTE [DISTWIDTH] IN BLOOD BY AUTOMATED COUNT: 12.3 % (ref 10–15)
EST. AVERAGE GLUCOSE BLD GHB EST-MCNC: 100 MG/DL
GLUCOSE UR STRIP-MCNC: NEGATIVE MG/DL
HBA1C MFR BLD: 5.1 % (ref 0–5.6)
HCT VFR BLD AUTO: 38.5 % (ref 35–47)
HGB BLD-MCNC: 13.4 G/DL (ref 11.7–15.7)
HGB UR QL STRIP: NEGATIVE
KETONES UR STRIP-MCNC: NEGATIVE MG/DL
LEUKOCYTE ESTERASE UR QL STRIP: NEGATIVE
MCH RBC QN AUTO: 31 PG (ref 26.5–33)
MCHC RBC AUTO-ENTMCNC: 34.8 G/DL (ref 31.5–36.5)
MCV RBC AUTO: 89.1 FL (ref 78–100)
MUCOUS THREADS #/AREA URNS LPF: PRESENT /LPF
NITRATE UR QL: NEGATIVE
PH UR STRIP: 5.5 [PH] (ref 5–7)
PLATELET # BLD AUTO: 299 10E3/UL (ref 150–450)
RBC # BLD AUTO: 4.32 10E6/UL (ref 3.8–5.2)
RBC #/AREA URNS AUTO: ABNORMAL /HPF
SP GR UR STRIP: >=1.03 (ref 1–1.03)
SQUAMOUS #/AREA URNS AUTO: ABNORMAL /LPF
TSH SERPL DL<=0.005 MIU/L-ACNC: 3.4 UIU/ML (ref 0.3–4.2)
UROBILINOGEN UR STRIP-ACNC: 0.2 E.U./DL
WBC # BLD AUTO: 9.83 10E3/UL (ref 4–11)
WBC #/AREA URNS AUTO: ABNORMAL /HPF

## 2025-08-19 PROCEDURE — 86901 BLOOD TYPING SEROLOGIC RH(D): CPT | Performed by: OBSTETRICS & GYNECOLOGY

## 2025-08-19 PROCEDURE — 86900 BLOOD TYPING SEROLOGIC ABO: CPT | Performed by: OBSTETRICS & GYNECOLOGY

## 2025-08-19 PROCEDURE — 85027 COMPLETE CBC AUTOMATED: CPT | Performed by: OBSTETRICS & GYNECOLOGY

## 2025-08-19 PROCEDURE — 84443 ASSAY THYROID STIM HORMONE: CPT | Performed by: OBSTETRICS & GYNECOLOGY

## 2025-08-19 PROCEDURE — 36415 COLL VENOUS BLD VENIPUNCTURE: CPT | Performed by: OBSTETRICS & GYNECOLOGY

## 2025-08-19 PROCEDURE — 83036 HEMOGLOBIN GLYCOSYLATED A1C: CPT | Performed by: OBSTETRICS & GYNECOLOGY

## 2025-08-19 PROCEDURE — 81001 URINALYSIS AUTO W/SCOPE: CPT | Performed by: OBSTETRICS & GYNECOLOGY

## 2025-08-19 PROCEDURE — 86850 RBC ANTIBODY SCREEN: CPT | Performed by: OBSTETRICS & GYNECOLOGY

## 2025-08-19 RX ORDER — ONDANSETRON 4 MG/1
4 TABLET, ORALLY DISINTEGRATING ORAL EVERY 8 HOURS PRN
Qty: 20 TABLET | Refills: 2 | Status: SHIPPED | OUTPATIENT
Start: 2025-08-19

## 2025-08-19 ASSESSMENT — EDINBURGH POSTNATAL DEPRESSION SCALE (EPDS)
I HAVE BLAMED MYSELF UNNECESSARILY WHEN THINGS WENT WRONG: NOT VERY OFTEN
I HAVE BEEN SO UNHAPPY THAT I HAVE HAD DIFFICULTY SLEEPING: NOT AT ALL
I HAVE BEEN ANXIOUS OR WORRIED FOR NO GOOD REASON: NO, NOT AT ALL
I HAVE LOOKED FORWARD WITH ENJOYMENT TO THINGS: AS MUCH AS I EVER DID
TOTAL SCORE: 1
THE THOUGHT OF HARMING MYSELF HAS OCCURRED TO ME: NEVER
I HAVE FELT SAD OR MISERABLE: NO, NOT AT ALL
I HAVE BEEN SO UNHAPPY THAT I HAVE BEEN CRYING: NO, NEVER
I HAVE BEEN ABLE TO LAUGH AND SEE THE FUNNY SIDE OF THINGS: AS MUCH AS I ALWAYS COULD
THINGS HAVE BEEN GETTING ON TOP OF ME: NO, I HAVE BEEN COPING AS WELL AS EVER
I HAVE FELT SCARED OR PANICKY FOR NO GOOD REASON: NO, NOT AT ALL

## 2025-08-20 ENCOUNTER — PATIENT OUTREACH (OUTPATIENT)
Dept: CARE COORDINATION | Facility: CLINIC | Age: 35
End: 2025-08-20

## 2025-08-20 LAB
C TRACH DNA SPEC QL PROBE+SIG AMP: NEGATIVE
HBV CORE AB SERPL QL IA: NONREACTIVE
HBV SURFACE AB SERPL IA-ACNC: <3.5 M[IU]/ML
HBV SURFACE AB SERPL IA-ACNC: NONREACTIVE M[IU]/ML
HBV SURFACE AG SERPL QL IA: NONREACTIVE
HCV AB SERPL QL IA: NONREACTIVE
HIV 1+2 AB+HIV1 P24 AG SERPL QL IA: NONREACTIVE
N GONORRHOEA DNA SPEC QL NAA+PROBE: NEGATIVE
RUBV IGG SERPL QL IA: 2.47 INDEX
RUBV IGG SERPL QL IA: POSITIVE
SPECIMEN TYPE: NORMAL
T PALLIDUM AB SER QL: NONREACTIVE

## 2025-08-21 LAB — BACTERIA UR CULT: NORMAL

## (undated) DEVICE — PREP CHLORAPREP 26ML TINTED ORANGE  260815

## (undated) DEVICE — SOL WATER IRRIG 1000ML BOTTLE 07139-09

## (undated) DEVICE — SOL NACL 0.9% IRRIG 1000ML BOTTLE 07138-09

## (undated) DEVICE — DECANTER VIAL 2006S

## (undated) DEVICE — GLOVE BIOGEL PI MICRO SZ 6.5 48565

## (undated) DEVICE — DRAPE POUCH INSTRUMENT 3 POCKET 1018L

## (undated) DEVICE — Device

## (undated) DEVICE — SU VICRYL 0 CT-1 36" J946H

## (undated) DEVICE — SU DERMABOND ADVANCED .7ML DNX12

## (undated) DEVICE — ANTIFOG SOLUTION SEE SHARP 150M TROCAR SWABS 30978 (COI)

## (undated) DEVICE — SUCTION CANNULA UTERINE 08MM CVD 022108-10

## (undated) DEVICE — ESU HOLSTER PLASTIC DISP E2400

## (undated) DEVICE — LABEL MEDICATION SYSTEM  3304

## (undated) DEVICE — SUCTION IRR STRYKERFLOW II W/TIP 250-070-520

## (undated) DEVICE — PREP CHLORHEXIDINE 4% 4OZ (HIBICLENS) 57504

## (undated) DEVICE — STOCKING SLEEVE COMPRESSION CALF MED

## (undated) DEVICE — SUCTION VACUUM CANISTER STANDARD W/LID&CAPS 003987-901

## (undated) DEVICE — DRSG TELFA 3X8" 1238

## (undated) DEVICE — SU PLAIN 3-0 CT 27" 852H

## (undated) DEVICE — GLOVE BIOGEL PI MICRO SZ 5.5 48555

## (undated) DEVICE — GLOVE BIOGEL PI MICRO SZ 6.0 48560

## (undated) DEVICE — PAD PERI INDIV WRAP 11" 2022

## (undated) DEVICE — ADH SKIN CLOSURE PREMIERPRO EXOFIN 1.0ML 3470

## (undated) DEVICE — CATH TRAY FOLEY SURESTEP 16FR W/URINE MTR STATLK LF A303416A

## (undated) DEVICE — GOWN LG DISP 9515

## (undated) DEVICE — ENDO POUCH UNIVERSAL RETRIEVAL SYSTEM INZII 5MM CD003

## (undated) DEVICE — GLOVE BIOGEL PI MICRO INDICATOR UNDERGLOVE SZ 7.0 48970

## (undated) DEVICE — DRAPE SHEET REV FOLD 3/4 9349

## (undated) DEVICE — GLOVE BIOGEL PI MICRO INDICATOR UNDERGLOVE SZ 6.5 48965

## (undated) DEVICE — GOWN XLG DISP 9545

## (undated) DEVICE — PACK LAPAROSCOPY/PELVISCOPY STD

## (undated) DEVICE — PACK C-SECTION LF PL15OTA83B

## (undated) DEVICE — SU MONOCRYL 0 CT-1 27" Y340H

## (undated) DEVICE — NDL SPINAL 22GA 3.5" QUINCKE 405181

## (undated) DEVICE — ENDO TROCAR SLEEVE KII ADV FIXATION 05X100MM CFS02

## (undated) DEVICE — LUBRICATING JELLY 4.25OZ

## (undated) DEVICE — BLADE KNIFE SURG 12 371112

## (undated) DEVICE — SUCTION MANIFOLD NEPTUNE 2 SYS 1 PORT 702-025-000

## (undated) DEVICE — CATH INTERMITTENT CLEAN-CATH FEMALE 14FR 6" VINYL LF 420614

## (undated) DEVICE — ESU LIGASURE MARYLAND LAPAROSCOPIC SLR/DVDR 5MMX37CM LF1937

## (undated) DEVICE — SU MONOCRYL 4-0 PS-2 18" UND Y496G

## (undated) DEVICE — ENDO TROCAR FIRST ENTRY KII FIOS ADV FIX 05X100MM CFF03

## (undated) DEVICE — TUBING VACUUM COLLECTION 6FT 23116

## (undated) DEVICE — NDL INSUFFLATION 13GA 120MM C2201

## (undated) DEVICE — ESU PENCIL SMOKE EVAC W/ROCKER SWITCH 0703-047-000

## (undated) DEVICE — TUBING INSUFFLATION W/FILTER CPC TO LUER 620-030-301

## (undated) DEVICE — STOCKING SLEEVE COMPRESSION CALF LG

## (undated) RX ORDER — DEXAMETHASONE SODIUM PHOSPHATE 4 MG/ML
INJECTION, SOLUTION INTRA-ARTICULAR; INTRALESIONAL; INTRAMUSCULAR; INTRAVENOUS; SOFT TISSUE
Status: DISPENSED
Start: 2025-01-29

## (undated) RX ORDER — TRANEXAMIC ACID 10 MG/ML
INJECTION, SOLUTION INTRAVENOUS
Status: DISPENSED
Start: 2023-03-24

## (undated) RX ORDER — TRANEXAMIC ACID 10 MG/ML
INJECTION, SOLUTION INTRAVENOUS
Status: DISPENSED
Start: 2025-01-29

## (undated) RX ORDER — BUPIVACAINE HYDROCHLORIDE AND EPINEPHRINE 2.5; 5 MG/ML; UG/ML
INJECTION, SOLUTION EPIDURAL; INFILTRATION; INTRACAUDAL; PERINEURAL
Status: DISPENSED
Start: 2025-01-29

## (undated) RX ORDER — ONDANSETRON 2 MG/ML
INJECTION INTRAMUSCULAR; INTRAVENOUS
Status: DISPENSED
Start: 2023-03-24

## (undated) RX ORDER — GABAPENTIN 300 MG/1
CAPSULE ORAL
Status: DISPENSED
Start: 2024-10-18

## (undated) RX ORDER — ALBUTEROL SULFATE 90 UG/1
INHALANT RESPIRATORY (INHALATION)
Status: DISPENSED
Start: 2025-01-29

## (undated) RX ORDER — FENTANYL CITRATE 50 UG/ML
INJECTION, SOLUTION INTRAMUSCULAR; INTRAVENOUS
Status: DISPENSED
Start: 2025-01-29

## (undated) RX ORDER — PROPOFOL 10 MG/ML
INJECTION, EMULSION INTRAVENOUS
Status: DISPENSED
Start: 2025-01-29

## (undated) RX ORDER — FENTANYL CITRATE 50 UG/ML
INJECTION, SOLUTION INTRAMUSCULAR; INTRAVENOUS
Status: DISPENSED
Start: 2023-03-24

## (undated) RX ORDER — FENTANYL CITRATE-0.9 % NACL/PF 10 MCG/ML
PLASTIC BAG, INJECTION (ML) INTRAVENOUS
Status: DISPENSED
Start: 2023-03-24

## (undated) RX ORDER — ACETAMINOPHEN 325 MG/1
TABLET ORAL
Status: DISPENSED
Start: 2024-10-18

## (undated) RX ORDER — ONDANSETRON 2 MG/ML
INJECTION INTRAMUSCULAR; INTRAVENOUS
Status: DISPENSED
Start: 2025-01-29

## (undated) RX ORDER — CEFAZOLIN SODIUM/WATER 2 G/20 ML
SYRINGE (ML) INTRAVENOUS
Status: DISPENSED
Start: 2025-01-29

## (undated) RX ORDER — ONDANSETRON 2 MG/ML
INJECTION INTRAMUSCULAR; INTRAVENOUS
Status: DISPENSED
Start: 2024-10-18

## (undated) RX ORDER — DEXMEDETOMIDINE HYDROCHLORIDE 100 UG/ML
INJECTION, SOLUTION INTRAVENOUS
Status: DISPENSED
Start: 2025-01-29

## (undated) RX ORDER — KETOROLAC TROMETHAMINE 30 MG/ML
INJECTION, SOLUTION INTRAMUSCULAR; INTRAVENOUS
Status: DISPENSED
Start: 2023-03-24

## (undated) RX ORDER — LIDOCAINE HCL/EPINEPHRINE/PF 2%-1:200K
VIAL (ML) INJECTION
Status: DISPENSED
Start: 2023-03-24

## (undated) RX ORDER — METHYLERGONOVINE MALEATE 0.2 MG/ML
INJECTION INTRAVENOUS
Status: DISPENSED
Start: 2023-03-24

## (undated) RX ORDER — GLYCOPYRROLATE 0.2 MG/ML
INJECTION, SOLUTION INTRAMUSCULAR; INTRAVENOUS
Status: DISPENSED
Start: 2025-01-29

## (undated) RX ORDER — KETOROLAC TROMETHAMINE 30 MG/ML
INJECTION, SOLUTION INTRAMUSCULAR; INTRAVENOUS
Status: DISPENSED
Start: 2025-01-29

## (undated) RX ORDER — IBUPROFEN 400 MG/1
TABLET, FILM COATED ORAL
Status: DISPENSED
Start: 2024-10-18

## (undated) RX ORDER — OXYTOCIN/0.9 % SODIUM CHLORIDE 30/500 ML
PLASTIC BAG, INJECTION (ML) INTRAVENOUS
Status: DISPENSED
Start: 2023-03-24

## (undated) RX ORDER — FENTANYL CITRATE 50 UG/ML
INJECTION, SOLUTION INTRAMUSCULAR; INTRAVENOUS
Status: DISPENSED
Start: 2024-10-18

## (undated) RX ORDER — LIDOCAINE HYDROCHLORIDE AND EPINEPHRINE 10; 10 MG/ML; UG/ML
INJECTION, SOLUTION INFILTRATION; PERINEURAL
Status: DISPENSED
Start: 2024-10-18

## (undated) RX ORDER — DEXAMETHASONE SODIUM PHOSPHATE 4 MG/ML
INJECTION, SOLUTION INTRA-ARTICULAR; INTRALESIONAL; INTRAMUSCULAR; INTRAVENOUS; SOFT TISSUE
Status: DISPENSED
Start: 2023-03-24